# Patient Record
Sex: FEMALE | Race: BLACK OR AFRICAN AMERICAN | Employment: FULL TIME | URBAN - METROPOLITAN AREA
[De-identification: names, ages, dates, MRNs, and addresses within clinical notes are randomized per-mention and may not be internally consistent; named-entity substitution may affect disease eponyms.]

---

## 2017-01-09 ENCOUNTER — TELEPHONE (OUTPATIENT)
Dept: SURGERY | Age: 52
End: 2017-01-09

## 2017-01-09 ENCOUNTER — DOCUMENTATION ONLY (OUTPATIENT)
Dept: ONCOLOGY | Age: 52
End: 2017-01-09

## 2017-01-09 ENCOUNTER — OFFICE VISIT (OUTPATIENT)
Dept: SURGERY | Age: 52
End: 2017-01-09

## 2017-01-09 VITALS
DIASTOLIC BLOOD PRESSURE: 83 MMHG | WEIGHT: 204 LBS | SYSTOLIC BLOOD PRESSURE: 118 MMHG | HEART RATE: 83 BPM | HEIGHT: 66 IN | BODY MASS INDEX: 32.78 KG/M2

## 2017-01-09 DIAGNOSIS — C77.3 BREAST CANCER METASTASIZED TO AXILLARY LYMPH NODE, RIGHT (HCC): Primary | ICD-10-CM

## 2017-01-09 DIAGNOSIS — C50.411 BREAST CANCER OF UPPER-OUTER QUADRANT OF RIGHT FEMALE BREAST (HCC): ICD-10-CM

## 2017-01-09 DIAGNOSIS — C50.911 BREAST CANCER METASTASIZED TO AXILLARY LYMPH NODE, RIGHT (HCC): Primary | ICD-10-CM

## 2017-01-09 NOTE — MR AVS SNAPSHOT
Visit Information Date & Time Provider Department Dept. Phone Encounter #  
 1/9/2017  8:30 AM Elinor Cintron MD [address] at [facility] [phone] Follow-up Instructions Routing History Follow-up and Disposition History Your Appointments 1/17/2017  8:45 AM  
Follow Up with Laury Khanna  [facility] at [facility]) Appt Note: f/u same day port flush [address] [id number] [phone]  
  
   
 [address] [facility] [address] Upcoming Health Maintenance Date Due Hepatitis C Screening 1965 Pneumococcal 19-64 Highest Risk (1 of 3 - PCV13) 3/26/1984 DTaP/Tdap/Td series (1 - Tdap) 3/26/1986 PAP AKA CERVICAL CYTOLOGY 3/26/1986 FOBT Q 1 YEAR AGE 50-75 3/26/2015 INFLUENZA AGE 9 TO ADULT 8/1/2016 BREAST CANCER SCRN MAMMOGRAM 7/12/2018 Allergies as of 1/9/2017  Review Complete On: 1/9/2017 By: Elinor Cintron MD  
 No Known Allergies Current Immunizations  Reviewed on 12/16/2016 No immunizations on file. Not reviewed this visit You Were Diagnosed With   
  
 Codes Comments Breast cancer metastasized to axillary lymph node, right ([facility] [address]    -  Primary ICD-10-CM: C50.911, C77.3 ICD-9-CM: 174.9, 196.3 Breast cancer of upper-outer quadrant of right female breast ([facility] [address]     ICD-10-CM: C50.411 ICD-9-CM: 174.4 Vitals BP Pulse Height(growth percentile) Weight(growth percentile) BMI OB Status 118/83 83 5' 6\" (1.676 m) 204 lb (92.5 kg) 32.93 kg/m2 Chemically Induced Smoking Status Never Smoker BMI and BSA Data Body Mass Index Body Surface Area  
 32.93 kg/m 2 2.08 m 2 Preferred Pharmacy Pharmacy Name Phone [facility] [address] [phone] Your Updated Medication List  
  
   
 This list is accurate as of: 1/9/17  2:56 PM.  Always use your most recent med list.  
  
  
  
  
 dexamethasone 4 mg tablet Commonly known as:  DECADRON Take 2 tabs by mouth after chemotherapy x 2 days every 3 weeks  
  
 ferrous sulfate 324 mg (65 mg iron) tablet Take  by mouth Daily (before breakfast). lidocaine-prilocaine topical cream  
Commonly known as:  EMLA Apply  to affected area as needed for Pain. LORazepam 0.5 mg tablet Commonly known as:  ATIVAN Take 1 Tab by mouth two (2) times daily as needed for Anxiety (for anxiety). Max Daily Amount: 1 mg.  
  
 ondansetron 8 mg disintegrating tablet Commonly known as:  ZOFRAN ODT Take 1 Tab by mouth every eight (8) hours as needed for Nausea. Indications: CANCER CHEMOTHERAPY-INDUCED NAUSEA AND VOMITING  
  
 OTHER Taking liquid iron supplement, twice daily OTHER(NON-FORMULARY) Take 1 Tab by mouth three (3) times daily. Probiotic 11 and protease  
  
 pantoprazole 40 mg tablet Commonly known as:  PROTONIX Take 1 Tab by mouth daily. prochlorperazine 10 mg tablet Commonly known as:  COMPAZINE Take 1 Tab by mouth every six (6) hours as needed. Indications: CANCER CHEMOTHERAPY-INDUCED NAUSEA AND VOMITING  
  
 VITAMIN D3 1,000 unit Cap Generic drug:  cholecalciferol Take  by mouth daily. To-Do List   
 01/09/2017 Imaging:  MRI BREAST BI W WO CONT   
  
 01/11/2017  1:30 PM  
(Arrive by 1:15 PM) Appointment with [facility] 1 at [facility] ([phone]) 1. Please bring a list or a bag of your current medications to your appointment 2. Please be sure to remove ALL hair clips, pins, extensions, etc., prior to arriving for your MRI procedure. 3. Bring any non [facility] films or CDs pertaining to the area being imaged with you on the day of appointment.  4. A written order with a valid diagnosis and [phone]  signature is required for all scheduled tests. 5. Check in at registration 1 hour before your appointment time unless you were instructed to do otherwise. 6. If taking birth control, hormone replacement therapy or herbal supplements (black cohosh) it is advised that you cease 1 month prior to appointment to ensure quality of imaging obtained. Please arrive 15 minutes prior to appointment to register. 01/17/2017 8:30 AM  
  Appointment with Isamar Gisella [facility] ([phone])  
  
 02/10/2017 8:30 AM  
  Appointment with [address] [place] [address] - [facility] ([phone])  
  
 03/10/2017 8:30 AM  
  Appointment with [address] [place] [address] - [facility] ([phone]) Patient Instructions Learning About Breast Cancer Treatments Your Care Instructions Breast cancer means abnormal cells grow out of control in one or both breasts. These cancer cells can spread from the breast to nearby lymph nodes and other tissues. They can also spread to other parts of the body. The type and stage of cancer you have is based on: · Where in the breast the cancer started. · The genetics of those cancer cells. · How far cancer has spread within the breast, to nearby tissues, or to other organs. · What the cancer cells look like under a microscope. · Whether there is cancer in the nearby lymph nodes. Tests that help find the stage of your cancer can help choose the right treatment for you. These tests can include a breast biopsy, lymph node biopsy, blood tests, and X-rays. You may need other tests as well, such as a bone, CT, or MRI scan. Whether you have more tests depends on your symptoms and the stage of the cancer. When you find out that you have cancer, you may feel many emotions and may need some help coping. Seek out family, friends, and counselors for support.  You also can do things at home to make yourself feel better while you go through treatment. Call the [facility] ([phone]) or visit its website at [employer] Overnight. Bluetest for more information. Follow-up care is a key part of your treatment and safety. Be sure to make and go to all appointments, and call your doctor if you are having problems. It's also a good idea to know your test results and keep a list of the medicines you take. How is breast cancer treated? Your doctor may combine treatments. This is a common way to treat breast cancer. Treatment depends on what type and stage of cancer you have. You may have: · Surgery to remove the cancer. · Radiation. This uses high-dose X-rays to kill cancer cells and shrink tumors. · Chemotherapy. This uses medicine to kill cancer cells. · Hormone therapy. This uses medicines such as tamoxifen. It limits the effect of the hormone estrogen. This hormone can help some types of breast cancer cells to grow. · Biological therapy. This uses medicines such as trastuzumab (Herceptin) to help your immune system fight the cancer. What surgeries are done for breast cancer? Surgery is a key part of treatment for breast cancer. The main types of surgeries are: · Breast-conserving surgery. This is surgery that does not remove the whole breast. This includes: 
¨ Lumpectomy. This surgery removes the cancer in the breast and some of the normal tissue around it. ¨ Partial mastectomy. This surgery removes part of the breast. The lining of the chest muscles below the cancer and some of the lymph nodes may also be removed. · Surgery to remove the breast. This includes: ¨ Total mastectomy. This removes the whole breast. 
¨ Nipple-sparing mastectomy. This removes the whole breast but leaves the nipple. ¨ Modified radical mastectomy. This removes the whole breast and the lymph nodes under the arm (axillary lymph nodes). ¨ Radical mastectomy. This removes the whole breast, the chest muscles, and all the lymph nodes under the arm. If lymph nodes under the arm are removed, they are looked at under the microscope to check for cancer. There are two types of lymph node removal: · Home lymph node biopsy removes the lymph node that is the first to receive lymph fluid from the tumor. If cancer has spread from the breast to the lymph nodes, cancer cells most often show up in the sentinel node first. 
· Axillary lymph node dissection removes most of the lymph nodes in the armpit. The type of surgery you have depends on the size, location, and type of the cancer. It also depends on your overall health and personal preferences. Even if your doctor removes all the cancer that can be seen at the time of your surgery, you may still need more treatment. You may get radiation, chemotherapy, or hormone therapy after surgery to try to kill any cancer cells that may be left. No matter what kind of surgery you have, you will get information about why you are having it, what its risks are, how to prepare, and what to do after surgery. Where can you learn more? Go to http://myrna-conchis.info/. Enter X810 in the search box to learn more about \"Learning About Breast Cancer Treatments. \" Current as of: July 26, 2016 Content Version: 11.1 © 3265-1620 [employer]. Care instructions adapted under license by [employer] (which disclaims liability or warranty for this information). If you have questions about a medical condition or this instruction, always ask your healthcare professional. [place] [address] any warranty or liability for your use of this information. Introducing [facility]! Dear Alin Hastings: 
Thank you for requesting a Edifilm account. Our records indicate that you already have an active Edifilm account. You can access your account anytime at [email or web address] Online/Orchestra Networks Did you know that you can access your hospital and ER discharge instructions at any time in Pepperdata? You can also review all of your test results from your hospital stay or ER visit. Additional Information If you have questions, please visit the Frequently Asked Questions section of the Pepperdata website at [email or web address] Remember, Pepperdata is NOT to be used for urgent needs. For medical emergencies, dial 911. Now available from your iPhone and Android! Please provide this summary of care documentation to your next provider. Your primary care clinician is listed as Ness Grossman. If you have any questions after today's visit, please call [phone].

## 2017-01-09 NOTE — PROGRESS NOTES
Case d/w surgery  Unsure why pt did not f/u with surgery during chemo as we always recommend  Pt can see us for f/u as scheduled unless she wants sooner appt  Pt needs imaging then surgery

## 2017-01-09 NOTE — PROGRESS NOTES
HISTORY OF PRESENT ILLNESS  Esme Mchugh is a 46 y.o. female. HPI ESTABLISHED patient here to discuss surgery for RIGHT breast cancer. She recently completed neoadjuvant chemotherapy for RIGHT breast triple negative breast cancer. Last chemo was on 12/16/16. Denies any pain at this time. States that her RIGHT breast feels softer. STAGE: T2N1 clinically triple neg with sammy met.     TREATMENT COURSE: Biopsy only Neoadjuvant chemo carbo/ taxol, DD AC    Review of Systems   All other systems reviewed and are negative. Physical Exam   Pulmonary/Chest: Right breast exhibits no inverted nipple, no mass, no nipple discharge, no skin change and no tenderness. Left breast exhibits no inverted nipple, no mass, no nipple discharge, no skin change and no tenderness. Breasts are symmetrical.   Bilateral pendulous breasts. No appreciable masses or adenopathy today. Lymphadenopathy:     She has no cervical adenopathy. She has no axillary adenopathy. Nursing note and vitals reviewed. ASSESSMENT and PLAN    ICD-10-CM ICD-9-CM    1. Breast cancer metastasized to axillary lymph node, right ([facility]) C50.911 174.9 MRI BREAST BI W WO CONT    C77.3 196.3    2. Breast cancer of upper-outer quadrant of right female breast ([facility] [address] C50.411 174.4      45 yo female with a right breast T2N1 triple negative breast cancer with sammy metastasis. She is here by herself. 30  minutes were spent face-to-face with the patient during this encounter and 90% of that time was spent on counseling and coordination of care. - will order post [place] breast mri to assess response  - patient needs mastectomy, alnd, reconstruction  - will refer back to Dr. Fabiola Cherry to do restaging scans and labs  - will get appointment with plastics this week. The patient finished chemo 3 weeks ago. She was initially told by myself I need to see her every 4-6 weeks to assess response. She did not make any follow-up appointments.  Will get her mri and plastics appointment as soon as possible.

## 2017-01-09 NOTE — PROGRESS NOTES
Patient presented to office after seeing breast surgeon and stated that was advised should have been seen by surgery office while on chemo and had imaging prior to surgery. Stated surgeon's office was to order ultrasound to check response to chemo. Advised would forward to provider and patient can discuss plan at next visit. Support given. Patient verbalized understanding. Order to provider to change port flush date to coincide with clinic visit.

## 2017-01-09 NOTE — PATIENT INSTRUCTIONS
Learning About Breast Cancer Treatments  Your Care Instructions  Breast cancer means abnormal cells grow out of control in one or both breasts. These cancer cells can spread from the breast to nearby lymph nodes and other tissues. They can also spread to other parts of the body. The type and stage of cancer you have is based on:  · Where in the breast the cancer started. · The genetics of those cancer cells. · How far cancer has spread within the breast, to nearby tissues, or to other organs. · What the cancer cells look like under a microscope. · Whether there is cancer in the nearby lymph nodes. Tests that help find the stage of your cancer can help choose the right treatment for you. These tests can include a breast biopsy, lymph node biopsy, blood tests, and X-rays. You may need other tests as well, such as a bone, CT, or MRI scan. Whether you have more tests depends on your symptoms and the stage of the cancer. When you find out that you have cancer, you may feel many emotions and may need some help coping. Seek out family, friends, and counselors for support. You also can do things at home to make yourself feel better while you go through treatment. Call the [employer] ([phone]) or visit its website at Bharat Matrimony[employer] Secret Recipe. Ticket Monster ([place]) for more information. Follow-up care is a key part of your treatment and safety. Be sure to make and go to all appointments, and call your doctor if you are having problems. It's also a good idea to know your test results and keep a list of the medicines you take. How is breast cancer treated? Your doctor may combine treatments. This is a common way to treat breast cancer. Treatment depends on what type and stage of cancer you have. You may have:  · Surgery to remove the cancer. · Radiation. This uses high-dose X-rays to kill cancer cells and shrink tumors. · Chemotherapy. This uses medicine to kill cancer cells. · Hormone therapy.  This uses medicines such as tamoxifen. It limits the effect of the hormone estrogen. This hormone can help some types of breast cancer cells to grow. · Biological therapy. This uses medicines such as trastuzumab (Herceptin) to help your immune system fight the cancer. What surgeries are done for breast cancer? Surgery is a key part of treatment for breast cancer. The main types of surgeries are:  · Breast-conserving surgery. This is surgery that does not remove the whole breast. This includes:  ¨ Lumpectomy. This surgery removes the cancer in the breast and some of the normal tissue around it. ¨ Partial mastectomy. This surgery removes part of the breast. The lining of the chest muscles below the cancer and some of the lymph nodes may also be removed. · Surgery to remove the breast. This includes:  ¨ Total mastectomy. This removes the whole breast.  ¨ Nipple-sparing mastectomy. This removes the whole breast but leaves the nipple. ¨ Modified radical mastectomy. This removes the whole breast and the lymph nodes under the arm (axillary lymph nodes). ¨ Radical mastectomy. This removes the whole breast, the chest muscles, and all the lymph nodes under the arm. If lymph nodes under the arm are removed, they are looked at under the microscope to check for cancer. There are two types of lymph node removal:  · [place] lymph node biopsy removes the lymph node that is the first to receive lymph fluid from the tumor. If cancer has spread from the breast to the lymph nodes, cancer cells most often show up in the sentinel node first.  · Axillary lymph node dissection removes most of the lymph nodes in the armpit. The type of surgery you have depends on the size, location, and type of the cancer. It also depends on your overall health and personal preferences. Even if your doctor removes all the cancer that can be seen at the time of your surgery, you may still need more treatment.  You may get radiation, chemotherapy, or hormone therapy after surgery to try to kill any cancer cells that may be left. No matter what kind of surgery you have, you will get information about why you are having it, what its risks are, how to prepare, and what to do after surgery. Where can you learn more? Go to http://myrna-conchis.info/. Enter X810 in the search box to learn more about \"Learning About Breast Cancer Treatments. \"  Current as of: July 26, 2016  Content Version: 11.1  © 1319-3107 [employer]. Care instructions adapted under license by [employer] (which disclaims liability or warranty for this information). If you have questions about a medical condition or this instruction, always ask your healthcare professional. [address] any warranty or liability for your use of this information.

## 2017-01-11 DIAGNOSIS — C50.411 BREAST CANCER OF UPPER-OUTER QUADRANT OF RIGHT FEMALE BREAST (HCC): Primary | ICD-10-CM

## 2017-01-13 ENCOUNTER — HOSPITAL ENCOUNTER (OUTPATIENT)
Dept: INFUSION THERAPY | Age: 52
End: 2017-01-13
Payer: COMMERCIAL

## 2017-01-17 ENCOUNTER — OFFICE VISIT (OUTPATIENT)
Dept: ONCOLOGY | Age: 52
End: 2017-01-17

## 2017-01-17 ENCOUNTER — DOCUMENTATION ONLY (OUTPATIENT)
Dept: ONCOLOGY | Age: 52
End: 2017-01-17

## 2017-01-17 ENCOUNTER — HOSPITAL ENCOUNTER (OUTPATIENT)
Dept: INFUSION THERAPY | Age: 52
Discharge: HOME | End: 2017-01-17
Payer: COMMERCIAL

## 2017-01-17 VITALS
SYSTOLIC BLOOD PRESSURE: 121 MMHG | HEIGHT: 66 IN | HEART RATE: 86 BPM | OXYGEN SATURATION: 98 % | WEIGHT: 207.2 LBS | BODY MASS INDEX: 33.3 KG/M2 | DIASTOLIC BLOOD PRESSURE: 84 MMHG | TEMPERATURE: 98.1 F | RESPIRATION RATE: 16 BRPM

## 2017-01-17 VITALS
RESPIRATION RATE: 18 BRPM | HEART RATE: 86 BPM | DIASTOLIC BLOOD PRESSURE: 91 MMHG | TEMPERATURE: 97.7 F | SYSTOLIC BLOOD PRESSURE: 129 MMHG

## 2017-01-17 DIAGNOSIS — C50.911 BREAST CANCER METASTASIZED TO AXILLARY LYMPH NODE, RIGHT (HCC): ICD-10-CM

## 2017-01-17 DIAGNOSIS — C77.3 BREAST CANCER METASTASIZED TO AXILLARY LYMPH NODE, RIGHT (HCC): ICD-10-CM

## 2017-01-17 DIAGNOSIS — Z09 CHEMOTHERAPY FOLLOW-UP EXAMINATION: ICD-10-CM

## 2017-01-17 DIAGNOSIS — C50.411 BREAST CANCER OF UPPER-OUTER QUADRANT OF RIGHT FEMALE BREAST (HCC): Primary | ICD-10-CM

## 2017-01-17 LAB
ALBUMIN SERPL BCP-MCNC: 3.5 G/DL (ref 3.5–5)
ALBUMIN/GLOB SERPL: 1.3 {RATIO} (ref 1.1–2.2)
ALP SERPL-CCNC: 65 U/L (ref 45–117)
ALT SERPL-CCNC: 21 U/L (ref 12–78)
ANION GAP BLD CALC-SCNC: 6 MMOL/L (ref 5–15)
AST SERPL W P-5'-P-CCNC: 14 U/L (ref 15–37)
BASOPHILS # BLD AUTO: 0 K/UL (ref 0–0.1)
BASOPHILS # BLD: 0 % (ref 0–1)
BILIRUB SERPL-MCNC: 0.3 MG/DL (ref 0.2–1)
BUN SERPL-MCNC: 8 MG/DL (ref 6–20)
BUN/CREAT SERPL: 12 (ref 12–20)
CALCIUM SERPL-MCNC: 9 MG/DL (ref 8.5–10.1)
CHLORIDE SERPL-SCNC: 105 MMOL/L (ref 97–108)
CO2 SERPL-SCNC: 27 MMOL/L (ref 21–32)
CREAT SERPL-MCNC: 0.68 MG/DL (ref 0.55–1.02)
DIFFERENTIAL METHOD BLD: ABNORMAL
EOSINOPHIL # BLD: 0.2 K/UL (ref 0–0.4)
EOSINOPHIL NFR BLD: 7 % (ref 0–7)
ERYTHROCYTE [DISTWIDTH] IN BLOOD BY AUTOMATED COUNT: 18 % (ref 11.5–14.5)
GLOBULIN SER CALC-MCNC: 2.7 G/DL (ref 2–4)
GLUCOSE SERPL-MCNC: 103 MG/DL (ref 65–100)
HCT VFR BLD AUTO: 34.9 % (ref 35–47)
HGB BLD-MCNC: 10.7 G/DL (ref 11.5–16)
LYMPHOCYTES # BLD AUTO: 29 % (ref 12–49)
LYMPHOCYTES # BLD: 0.6 K/UL (ref 0.8–3.5)
MAGNESIUM SERPL-MCNC: 1.7 MG/DL (ref 1.6–2.4)
MCH RBC QN AUTO: 28.7 PG (ref 26–34)
MCHC RBC AUTO-ENTMCNC: 30.7 G/DL (ref 30–36.5)
MCV RBC AUTO: 93.6 FL (ref 80–99)
MONOCYTES # BLD: 0.2 K/UL (ref 0–1)
MONOCYTES NFR BLD AUTO: 11 % (ref 5–13)
NEUTS SEG # BLD: 1.2 K/UL (ref 1.8–8)
NEUTS SEG NFR BLD AUTO: 53 % (ref 32–75)
PLATELET # BLD AUTO: 208 K/UL (ref 150–400)
POTASSIUM SERPL-SCNC: 3.6 MMOL/L (ref 3.5–5.1)
PROT SERPL-MCNC: 6.2 G/DL (ref 6.4–8.2)
RBC # BLD AUTO: 3.73 M/UL (ref 3.8–5.2)
RBC MORPH BLD: ABNORMAL
SODIUM SERPL-SCNC: 138 MMOL/L (ref 136–145)
WBC # BLD AUTO: 2.2 K/UL (ref 3.6–11)

## 2017-01-17 PROCEDURE — 80053 COMPREHEN METABOLIC PANEL: CPT | Performed by: NURSE PRACTITIONER

## 2017-01-17 PROCEDURE — 85025 COMPLETE CBC W/AUTO DIFF WBC: CPT | Performed by: NURSE PRACTITIONER

## 2017-01-17 PROCEDURE — 77030012965 HC NDL HUBR BBMI -A

## 2017-01-17 PROCEDURE — 36415 COLL VENOUS BLD VENIPUNCTURE: CPT | Performed by: NURSE PRACTITIONER

## 2017-01-17 PROCEDURE — 36591 DRAW BLOOD OFF VENOUS DEVICE: CPT

## 2017-01-17 PROCEDURE — 83735 ASSAY OF MAGNESIUM: CPT | Performed by: NURSE PRACTITIONER

## 2017-01-17 PROCEDURE — 74011250636 HC RX REV CODE- 250/636: Performed by: NURSE PRACTITIONER

## 2017-01-17 RX ORDER — SODIUM CHLORIDE 0.9 % (FLUSH) 0.9 %
10 SYRINGE (ML) INJECTION AS NEEDED
Status: ACTIVE | OUTPATIENT
Start: 2017-01-17 | End: 2017-01-18

## 2017-01-17 RX ORDER — SODIUM CHLORIDE 9 MG/ML
10 INJECTION INTRAMUSCULAR; INTRAVENOUS; SUBCUTANEOUS AS NEEDED
Status: ACTIVE | OUTPATIENT
Start: 2017-01-17 | End: 2017-01-18

## 2017-01-17 RX ORDER — HEPARIN 100 UNIT/ML
500 SYRINGE INTRAVENOUS AS NEEDED
Status: ACTIVE | OUTPATIENT
Start: 2017-01-17 | End: 2017-01-18

## 2017-01-17 RX ADMIN — Medication 10 ML: at 08:49

## 2017-01-17 RX ADMIN — SODIUM CHLORIDE, PRESERVATIVE FREE 500 UNITS: 5 INJECTION INTRAVENOUS at 08:49

## 2017-01-17 NOTE — PROGRESS NOTES
HEME/ONC PROGRESS NOTE       Alfonso Garduno is a 46 y.o. 1965 female and presents with Breast Cancer    CC  Triple neg right breast cancer 7/16    HPI  Pt seen today in office for f/u breast cancer. Ms. Den Casas completed chemotherapy with Carbo/Taxol followed by THALIA ARRINGTON on 12/16/17. She has met with surgery and is here for follow-up post chemotherapy today. Ms. Den Casas reports she is feeling well off chemotherapy. She denies nausea, vomiting, or diarrhea. She has had no shortness of breath or chest pain. She denies headaches or vision changes. She reports energy level is stable. DX   Encounter Diagnoses   Name Primary?     Breast cancer of upper-outer quadrant of right female breast ([address] Yes    Breast cancer metastasized to axillary lymph node, right ([address]     Chemotherapy follow-up examination         STAGE: T2N1 clinically triple neg    TREATMENT COURSE:  Biopsy only  Neoadjuvant chemo carbo/ taxol, THALIA ARRINGTON    Past Medical History   Diagnosis Date    Atopic dermatitis     Cancer [facility]) July 2016     R breast cancer    Pleurisy 2006    Thyroid disease      \"nodule\"- benign     Past Surgical History   Procedure Laterality Date    Hx tonsillectomy  1974    Hx breast biopsy Right 2016    Hx gyn       [facility]     Social History     Social History    Marital status:      Spouse name: N/A    Number of children: N/A    Years of education: N/A     Social History Main Topics    Smoking status: Never Smoker    Smokeless tobacco: Never Used    Alcohol use No    Drug use: No    Sexual activity: Not Asked     Other Topics Concern    None     Social History Narrative     Family History   Problem Relation Age of Onset    Colon Cancer Mother 39    Colon Cancer Paternal Uncle 79    Colon Cancer Paternal Uncle 79     No Known Allergies    Review of Systems    A comprehensive review of systems was negative except for: per HPI     Objective:  Visit Vitals    /84    Pulse 86    Temp 98.1 °F (36.7 °C) (Oral)    Resp 16    Ht 5' 6\" (1.676 m)    Wt 207 lb 3.2 oz (94 kg)    SpO2 98%    BMI 33.44 kg/m2         Physical Exam:   General appearance - alert, well appearing, and in no distress, oriented to person, place, and time and overweight  Mental status - alert, oriented to person, place, and time, normal mood, behavior, speech, dress, motor activity, and thought processes  EYE-conj clear  Mouth - mucous membranes moist, pharynx normal. No lesions or thrush. Neck - supple, no significant adenopathy   Chest - clear to auscultation bilaterally  Heart - regular rate and rhythm  Abdomen - round, soft, nontender  Ext-no pedal edema noted  Skin-Warm and dry. Neuro -alert, oriented, normal speech, no focal findings or movement disorder noted  Breasts - No palpable masses bilaterally    Diagnostic Imaging   reviewed  Results for orders placed during the hospital encounter of 07/26/16   CT ABD PELV W CONT    Narrative **Final Report**       ICD Codes / Adm. Diagnosis: 174.4  C50.411 / Malignant neoplasm of upper-ou    Malignant neoplasm of upper-  Examination:  CT ABD PELVIS W CON  - [id number] - Jul 26 2016 11:57AM  Accession No:  [id number]  Reason:  stage 2 breast cancer      REPORT:  INDICATION:  Stage II breast cancer. Surgery:  None reported. EXAM: Multiple contiguous helically acquired images were obtained through   the chest, abdomen and pelvis following intravenous 97 ccs and oral contrast   administration. Coronal and sagittal reconstructions were performed. CT dose reduction was achieved through the use of a standardized protocol   tailored for this examination and automatic exposure control for dose   modulation. FINDINGS:     Previous studies for comparison:  None are available. Images through the thyroid gland reveal multinodular enlargement of the left   lobe. Images through the right axilla reveal enlargement of the axillary lymph   nodes with a dominant node measuring 4.6 x 2.3 cm in size. Mild prominence   of the left axillary nodes is noted. Images through the right breast reveal the presence of a 2.2 x 2.0 cm soft   tissue mass. An additional contiguous nodular density is present measuring   1.3 cm. Images through the mediastinum and hilar regions reveal no mass or   adenopathy. Images through the lung parenchyma reveal no parenchymal mass or pleural   effusion. Images through the liver reveal scattered tiny hypodensities throughout both   lobes too small to accurately characterize. No biliary dilatation. There is adequate opacification of the hepatic and portal veins. Images through the gallbladder fossa reveal no gallstones. The spleen is normal in size. The pancreas has a normal morphologic appearance. Bilateral renal function is demonstrated. No hydronephrosis is present. The adrenal glands are normal.    The visualized opacified bowel loops are  nonobstructed . Images through the pelvis reveal enlargement of the uterus presumably   representing fibroid change. Small ovarian cysts appear to be present. Otherwise no mass or lymphadenopathy or drainable fluid collection. Bone windows demonstrate no destructive process. Tiny sclerotic densities in   the left humeral head and right iliac wing possibly bone island. IMPRESSION:    Multinodular enlargement of the left lobe of the thyroid gland with mild   displacement of the trachea. There is a two cm soft tissue mass in the right breast.    Right axillary lymphadenopathy. Multiple scattered tiny hypodensities in both lobes of the liver too small   to characterize. Otherwise no evidence of metastatic disease to the chest, abdomen, and   pelvis.               Signing/Reading Doctor: Chuck Fan ([id number])    Sharri Fan ([id number])  Jul 26 2016  2:04PM                                Lab Results  reviewed  Lab Results   Component Value Date/Time    WBC 2.2 01/17/2017 08:42 AM    HGB 10.7 01/17/2017 08:42 AM    HCT 34.9 01/17/2017 08:42 AM    PLATELET 409 14/95/9910 08:42 AM    MCV 93.6 01/17/2017 08:42 AM       Lab Results   Component Value Date/Time    Sodium 138 01/17/2017 08:42 AM    Potassium 3.6 01/17/2017 08:42 AM    Chloride 105 01/17/2017 08:42 AM    CO2 27 01/17/2017 08:42 AM    Anion gap 6 01/17/2017 08:42 AM    Glucose 103 01/17/2017 08:42 AM    BUN 8 01/17/2017 08:42 AM    Creatinine 0.68 01/17/2017 08:42 AM    BUN/Creatinine ratio 12 01/17/2017 08:42 AM    GFR est AA >60 01/17/2017 08:42 AM    GFR est non-AA >60 01/17/2017 08:42 AM    Calcium 9.0 01/17/2017 08:42 AM    ALT 21 01/17/2017 08:42 AM    AST 14 01/17/2017 08:42 AM    Alk. phosphatase 65 01/17/2017 08:42 AM    Protein, total 6.2 01/17/2017 08:42 AM    Albumin 3.5 01/17/2017 08:42 AM    Globulin 2.7 01/17/2017 08:42 AM    A-G Ratio 1.3 01/17/2017 08:42 AM       Assessment/Plan:    1. Clinical T2N1 right triple negative breast cancer s/p biopsy only    Breast MRI neg on left and 2 masses on right with axilla mass. Staging studies neg bone scan and CTs neg. ECHO with EF 64% prior to chemo. She completed neoadjuvant chemotherapy with Carbo/Taxol followed by DD AC on 12/16/16. She met with surgery 1/9/17 and met with plastic surgery yesterday. She is waiting for surgery date to be scheduled. Restaging CT's chest/abdomen/pelvis ordered to evaluate post-chemotherapy. 2.  Anemia prior to chemo start. Hx iron deficiency. Labs pending from today. Patient continues on ferrous sulfate twice daily. Follow-up in 1 month. Seen in conjunction with Micha Lira NP. ICD-10-CM ICD-9-CM    1. Breast cancer of upper-outer quadrant of right female breast ([facility] [address] C50.411 174.4 CT CHEST ABD PELV W CONT      REFERRAL TO RADIATION ONCOLOGY   2. Breast cancer metastasized to axillary lymph node, right ([facility]) C50.911 174.9 CT CHEST ABD PELV W CONT    C77.3 196.3 REFERRAL TO RADIATION ONCOLOGY   3. Chemotherapy follow-up examination Z09 V67.2        Current Outpatient Prescriptions   Medication Sig    ferrous sulfate 324 mg (65 mg iron) tablet Take  by mouth Daily (before breakfast).  cholecalciferol (VITAMIN D3) 1,000 unit cap Take  by mouth daily.  OTHER,NON-FORMULARY, Take 1 Tab by mouth three (3) times daily. Probiotic 11 and protease    LORazepam (ATIVAN) 0.5 mg tablet Take 1 Tab by mouth two (2) times daily as needed for Anxiety (for anxiety). Max Daily Amount: 1 mg.  ondansetron (ZOFRAN ODT) 8 mg disintegrating tablet Take 1 Tab by mouth every eight (8) hours as needed for Nausea. Indications: CANCER CHEMOTHERAPY-INDUCED NAUSEA AND VOMITING    prochlorperazine (COMPAZINE) 10 mg tablet Take 1 Tab by mouth every six (6) hours as needed. Indications: CANCER CHEMOTHERAPY-INDUCED NAUSEA AND VOMITING    lidocaine-prilocaine (EMLA) topical cream Apply  to affected area as needed for Pain. No current facility-administered medications for this visit. continue present plan, call if any problems  There are no Patient Instructions on file for this visit. Follow-up Disposition:  Return in about 1 month (around 2/17/2017).     Jose Francisco Horn NP

## 2017-01-17 NOTE — PROGRESS NOTES
0830 Pt arrived ambulatory and in no distress for port flush and labs. Port accessed without issue, positive blood return, labs drawn and flushed with heparin to deaccess. Meds:  Heparin flush 500 units  NS flush  Patient Vitals for the past 12 hrs:   Temp Pulse Resp BP   01/17/17 0836 97.7 °F (36.5 °C) 86 18 (!) 129/91     0849 Pt discharged ambulatory, tolerated visit well. Nect appointment scheduled for 2/10/17 at 0830. See [facility] for complete labs. Recent Results (from the past 12 hour(s))   CBC WITH AUTOMATED DIFF    Collection Time: 01/17/17  8:42 AM   Result Value Ref Range    WBC 2.2 (L) 3.6 - 11.0 K/uL    RBC 3.73 (L) 3.80 - 5.20 M/uL    HGB 10.7 (L) 11.5 - 16.0 g/dL    HCT 34.9 (L) 35.0 - 47.0 %    MCV 93.6 80.0 - 99.0 FL    MCH 28.7 26.0 - 34.0 PG    MCHC 30.7 30.0 - 36.5 g/dL    RDW 18.0 (H) 11.5 - 14.5 %    PLATELET 003 505 - 468 K/uL    NEUTROPHILS 53 32 - 75 %    LYMPHOCYTES 29 12 - 49 %    MONOCYTES 11 5 - 13 %    EOSINOPHILS 7 0 - 7 %    BASOPHILS 0 0 - 1 %    ABS. NEUTROPHILS 1.2 (L) 1.8 - 8.0 K/UL    ABS. LYMPHOCYTES 0.6 (L) 0.8 - 3.5 K/UL    ABS. MONOCYTES 0.2 0.0 - 1.0 K/UL    ABS. EOSINOPHILS 0.2 0.0 - 0.4 K/UL    ABS.  BASOPHILS 0.0 0.0 - 0.1 K/UL    DF SMEAR SCANNED      RBC COMMENTS TEARDROP CELLS  1+        RBC COMMENTS OVALOCYTES  1+        RBC COMMENTS SCHISTOCYTES  1+        RBC COMMENTS ANISOCYTOSIS  1+       METABOLIC PANEL, COMPREHENSIVE    Collection Time: 01/17/17  8:42 AM   Result Value Ref Range    Sodium 138 136 - 145 mmol/L    Potassium 3.6 3.5 - 5.1 mmol/L    Chloride 105 97 - 108 mmol/L    CO2 27 21 - 32 mmol/L    Anion gap 6 5 - 15 mmol/L    Glucose 103 (H) 65 - 100 mg/dL    BUN 8 6 - 20 MG/DL    Creatinine 0.68 0.55 - 1.02 MG/DL    BUN/Creatinine ratio 12 12 - 20      GFR est AA >60 >60 ml/min/1.73m2    GFR est non-AA >60 >60 ml/min/1.73m2    Calcium 9.0 8.5 - 10.1 MG/DL    Bilirubin, total 0.3 0.2 - 1.0 MG/DL    ALT 21 12 - 78 U/L    AST 14 (L) 15 - 37 U/L Alk. phosphatase 65 45 - 117 U/L    Protein, total 6.2 (L) 6.4 - 8.2 g/dL    Albumin 3.5 3.5 - 5.0 g/dL    Globulin 2.7 2.0 - 4.0 g/dL    A-G Ratio 1.3 1.1 - 2.2     MAGNESIUM    Collection Time: 01/17/17  8:42 AM   Result Value Ref Range    Magnesium 1.7 1.6 - 2.4 mg/dL

## 2017-01-17 NOTE — PROGRESS NOTES
Problem: Knowledge Deficit  Goal: *Verbalizes understanding of procedures and medications  Outcome: Progressing Towards Goal  Discussed port flushes

## 2017-01-19 ENCOUNTER — TELEPHONE (OUTPATIENT)
Dept: ONCOLOGY | Age: 52
End: 2017-01-19

## 2017-01-19 ENCOUNTER — TELEPHONE (OUTPATIENT)
Dept: SURGERY | Age: 52
End: 2017-01-19

## 2017-01-19 NOTE — TELEPHONE ENCOUNTER
[facility]/rad/onc called HIPAA verified. Stated patient has never contacted their office for referral.  Information provided, rad/onc to reach out to patient.

## 2017-01-20 ENCOUNTER — TELEPHONE (OUTPATIENT)
Dept: ONCOLOGY | Age: 52
End: 2017-01-20

## 2017-01-20 NOTE — TELEPHONE ENCOUNTER
Verbally informed patient of surgery   Date: 02/23/2017 @ 11:15 am   Arrive: 9:00 AM  report to [address] past ER; [address] [facility]. Entrance.  MOB 3      Gave following instructions:  NPO after Midnight the night before  Shower in AM, no lotion, deodorant, powder,perfume or makeup  Will need  morning of the surgery

## 2017-01-20 NOTE — TELEPHONE ENCOUNTER
Per [facility] patient was contacted to schedule appt and did not know if needed to be seen prior to or after surgery. Advised patient instructed to make appt when convenient and could be done either prior to or after surgery for treatment plan discussion. Thanked for info.

## 2017-01-21 ENCOUNTER — HOSPITAL ENCOUNTER (OUTPATIENT)
Dept: CT IMAGING | Age: 52
Discharge: HOME | End: 2017-01-21
Attending: NURSE PRACTITIONER
Payer: COMMERCIAL

## 2017-01-21 DIAGNOSIS — C77.3 BREAST CANCER METASTASIZED TO AXILLARY LYMPH NODE, RIGHT (HCC): ICD-10-CM

## 2017-01-21 DIAGNOSIS — C50.911 BREAST CANCER METASTASIZED TO AXILLARY LYMPH NODE, RIGHT (HCC): ICD-10-CM

## 2017-01-21 DIAGNOSIS — C50.411 BREAST CANCER OF UPPER-OUTER QUADRANT OF RIGHT FEMALE BREAST (HCC): ICD-10-CM

## 2017-01-21 PROCEDURE — 71260 CT THORAX DX C+: CPT

## 2017-01-21 PROCEDURE — 74177 CT ABD & PELVIS W/CONTRAST: CPT

## 2017-01-21 PROCEDURE — 74011636320 HC RX REV CODE- 636/320: Performed by: NURSE PRACTITIONER

## 2017-01-21 PROCEDURE — 74011000258 HC RX REV CODE- 258: Performed by: NURSE PRACTITIONER

## 2017-01-21 RX ORDER — SODIUM CHLORIDE 0.9 % (FLUSH) 0.9 %
10 SYRINGE (ML) INJECTION
Status: COMPLETED | OUTPATIENT
Start: 2017-01-21 | End: 2017-01-21

## 2017-01-21 RX ADMIN — IOHEXOL 50 ML: 240 INJECTION, SOLUTION INTRATHECAL; INTRAVASCULAR; INTRAVENOUS; ORAL at 10:26

## 2017-01-21 RX ADMIN — IOPAMIDOL 100 ML: 755 INJECTION, SOLUTION INTRAVENOUS at 10:26

## 2017-01-21 RX ADMIN — Medication 10 ML: at 10:26

## 2017-01-21 RX ADMIN — SODIUM CHLORIDE 100 ML: 900 INJECTION, SOLUTION INTRAVENOUS at 10:26

## 2017-01-23 ENCOUNTER — PATIENT MESSAGE (OUTPATIENT)
Dept: SURGERY | Age: 52
End: 2017-01-23

## 2017-01-23 ENCOUNTER — TELEPHONE (OUTPATIENT)
Dept: ONCOLOGY | Age: 52
End: 2017-01-23

## 2017-01-23 NOTE — TELEPHONE ENCOUNTER
[facility] states that Ricarda Sanderson has called the office requested scan results however the scan was ordered by our office. Can we please call the patient with results?

## 2017-01-23 NOTE — TELEPHONE ENCOUNTER
From: Christina Martin  To: Nehal Nettles MD  Sent: 1/23/2017 11:05 AM EST  Subject: Non-Urgent Medical Question    Good Morning. Doctor Analilia Steward (plastic surgeon) office says I will on be in the hospital for 1 day after procedures since both techniques will take under 2 hours. Is this true? Also, I have some paperwork that needs to be signed before February due to time off work. How can that be handled? Finally, want to know how CT scan was.  Thanks, Daphney Mcmillan

## 2017-01-25 DIAGNOSIS — C50.411 MALIGNANT NEOPLASM OF UPPER-OUTER QUADRANT OF RIGHT FEMALE BREAST (HCC): Primary | ICD-10-CM

## 2017-01-27 ENCOUNTER — DOCUMENTATION ONLY (OUTPATIENT)
Dept: SURGERY | Age: 52
End: 2017-01-27

## 2017-01-27 ENCOUNTER — TELEPHONE (OUTPATIENT)
Dept: SURGERY | Age: 52
End: 2017-01-27

## 2017-01-27 NOTE — TELEPHONE ENCOUNTER
Patient requesting letter to be excused from testifying in grievance hearing from work on 2/15/17, as it is the week before her surgery. See letter. Faxed to provided # [phone]. Fax confirmation received.

## 2017-01-27 NOTE — PROGRESS NOTES
Trace Armijo emailed me patient's [place] paperwork to be filled out for her 2-23-17 surgery. Completed and emailed back to Trace Armijo so patient can pick them up.

## 2017-01-31 ENCOUNTER — HOSPITAL ENCOUNTER (OUTPATIENT)
Dept: INFUSION THERAPY | Age: 52
End: 2017-01-31

## 2017-02-03 ENCOUNTER — DOCUMENTATION ONLY (OUTPATIENT)
Dept: ONCOLOGY | Age: 52
End: 2017-02-03

## 2017-02-10 ENCOUNTER — HOSPITAL ENCOUNTER (OUTPATIENT)
Dept: INFUSION THERAPY | Age: 52
Discharge: HOME | End: 2017-02-10
Payer: COMMERCIAL

## 2017-02-10 ENCOUNTER — DOCUMENTATION ONLY (OUTPATIENT)
Dept: ONCOLOGY | Age: 52
End: 2017-02-10

## 2017-02-10 VITALS
RESPIRATION RATE: 18 BRPM | HEART RATE: 80 BPM | DIASTOLIC BLOOD PRESSURE: 93 MMHG | OXYGEN SATURATION: 96 % | TEMPERATURE: 98.1 F | SYSTOLIC BLOOD PRESSURE: 134 MMHG

## 2017-02-10 LAB
ALBUMIN SERPL BCP-MCNC: 3.5 G/DL (ref 3.5–5)
ALBUMIN/GLOB SERPL: 1.1 {RATIO} (ref 1.1–2.2)
ALP SERPL-CCNC: 83 U/L (ref 45–117)
ALT SERPL-CCNC: 19 U/L (ref 12–78)
ANION GAP BLD CALC-SCNC: 7 MMOL/L (ref 5–15)
AST SERPL W P-5'-P-CCNC: 10 U/L (ref 15–37)
BASOPHILS # BLD AUTO: 0 K/UL (ref 0–0.1)
BASOPHILS # BLD: 1 % (ref 0–1)
BILIRUB SERPL-MCNC: 0.3 MG/DL (ref 0.2–1)
BUN SERPL-MCNC: 9 MG/DL (ref 6–20)
BUN/CREAT SERPL: 13 (ref 12–20)
CALCIUM SERPL-MCNC: 9.1 MG/DL (ref 8.5–10.1)
CHLORIDE SERPL-SCNC: 104 MMOL/L (ref 97–108)
CO2 SERPL-SCNC: 27 MMOL/L (ref 21–32)
CREAT SERPL-MCNC: 0.68 MG/DL (ref 0.55–1.02)
EOSINOPHIL # BLD: 0.1 K/UL (ref 0–0.4)
EOSINOPHIL NFR BLD: 5 % (ref 0–7)
ERYTHROCYTE [DISTWIDTH] IN BLOOD BY AUTOMATED COUNT: 16 % (ref 11.5–14.5)
GLOBULIN SER CALC-MCNC: 3.1 G/DL (ref 2–4)
GLUCOSE SERPL-MCNC: 95 MG/DL (ref 65–100)
HCT VFR BLD AUTO: 35.4 % (ref 35–47)
HGB BLD-MCNC: 11.2 G/DL (ref 11.5–16)
LYMPHOCYTES # BLD AUTO: 38 % (ref 12–49)
LYMPHOCYTES # BLD: 0.8 K/UL (ref 0.8–3.5)
MAGNESIUM SERPL-MCNC: 1.9 MG/DL (ref 1.6–2.4)
MCH RBC QN AUTO: 27.9 PG (ref 26–34)
MCHC RBC AUTO-ENTMCNC: 31.6 G/DL (ref 30–36.5)
MCV RBC AUTO: 88.3 FL (ref 80–99)
MONOCYTES # BLD: 0.2 K/UL (ref 0–1)
MONOCYTES NFR BLD AUTO: 7 % (ref 5–13)
NEUTS SEG # BLD: 1.1 K/UL (ref 1.8–8)
NEUTS SEG NFR BLD AUTO: 49 % (ref 32–75)
PLATELET # BLD AUTO: 197 K/UL (ref 150–400)
POTASSIUM SERPL-SCNC: 3.8 MMOL/L (ref 3.5–5.1)
PROT SERPL-MCNC: 6.6 G/DL (ref 6.4–8.2)
RBC # BLD AUTO: 4.01 M/UL (ref 3.8–5.2)
SODIUM SERPL-SCNC: 138 MMOL/L (ref 136–145)
WBC # BLD AUTO: 2.2 K/UL (ref 3.6–11)

## 2017-02-10 PROCEDURE — 74011250636 HC RX REV CODE- 250/636: Performed by: INTERNAL MEDICINE

## 2017-02-10 PROCEDURE — 36591 DRAW BLOOD OFF VENOUS DEVICE: CPT

## 2017-02-10 PROCEDURE — 83735 ASSAY OF MAGNESIUM: CPT | Performed by: INTERNAL MEDICINE

## 2017-02-10 PROCEDURE — 85025 COMPLETE CBC W/AUTO DIFF WBC: CPT | Performed by: INTERNAL MEDICINE

## 2017-02-10 PROCEDURE — 36415 COLL VENOUS BLD VENIPUNCTURE: CPT | Performed by: INTERNAL MEDICINE

## 2017-02-10 PROCEDURE — 74011000250 HC RX REV CODE- 250: Performed by: INTERNAL MEDICINE

## 2017-02-10 PROCEDURE — 77030012965 HC NDL HUBR BBMI -A

## 2017-02-10 PROCEDURE — 80053 COMPREHEN METABOLIC PANEL: CPT | Performed by: INTERNAL MEDICINE

## 2017-02-10 RX ORDER — SODIUM CHLORIDE 9 MG/ML
10 INJECTION INTRAMUSCULAR; INTRAVENOUS; SUBCUTANEOUS AS NEEDED
Status: ACTIVE | OUTPATIENT
Start: 2017-02-10 | End: 2017-02-11

## 2017-02-10 RX ORDER — HEPARIN 100 UNIT/ML
500 SYRINGE INTRAVENOUS AS NEEDED
Status: ACTIVE | OUTPATIENT
Start: 2017-02-10 | End: 2017-02-11

## 2017-02-10 RX ORDER — SODIUM CHLORIDE 0.9 % (FLUSH) 0.9 %
5-10 SYRINGE (ML) INJECTION AS NEEDED
Status: DISCONTINUED | OUTPATIENT
Start: 2017-02-10 | End: 2017-02-14 | Stop reason: HOSPADM

## 2017-02-10 RX ADMIN — Medication 10 ML: at 08:53

## 2017-02-10 RX ADMIN — Medication 500 UNITS: at 08:53

## 2017-02-10 RX ADMIN — SODIUM CHLORIDE 10 ML: 9 INJECTION, SOLUTION INTRAMUSCULAR; INTRAVENOUS; SUBCUTANEOUS at 08:52

## 2017-02-10 NOTE — PROGRESS NOTES
Patient stopped by office today to see if she needed an appointment. She has surgery scheduled for 2/22/17, advised she can follow-up after breast surgery unless any new issues. Discussed with Dr. Luis Arroyo who is in agreement. She reports she is concerned she could have an ear infection. Advised that she follow-up with her PCP for ear evaluation and management. She reports she has a sense of fullness in her right axilla. Advised to follow-up with breast surgery for evaluation of axillary fullness and patient reports she will contact Dr. Ira Preston. She denies any additional questions or concerns at this time. Will forward to [facility] to call patient to schedule an appointment after surgery with Dr. Luis Arroyo.

## 2017-02-13 NOTE — PERIOP NOTES
Faxed CMP, CBC done 2/10/17 to Dr. Sammy multani's office, attention Dagoberto Juarez, requesting orders and are these labs  Okay, and do we need anything else pre op like an EKG. Left my fax/phone number on fax. Called 2/10/17 leaving a voicemail for orders: later I found CBC/CMP results in [facility]. 2/16/17  0850: Dr. David Maldonado nurse, O[facility] Perales says CBC/CMP good for surgery. No EKG is needed unless anesthesia needs one per , says Shilpi Maciel. Chart has been marked. She will fax orders.

## 2017-02-16 ENCOUNTER — TELEPHONE (OUTPATIENT)
Dept: SURGERY | Age: 52
End: 2017-02-16

## 2017-02-16 NOTE — TELEPHONE ENCOUNTER
Both Santa Chavis, and I called and talked to the patient and offered her an appointment to come in and talk to Dr. Kristen Metz on Monday to go over her questions about surgery. She declined this.

## 2017-02-21 NOTE — PERIOP NOTES
[facility]  Ambulatory Surgery Unit  Pre-operative Instructions    Surgery/Procedure Date  2/23/17            Tentative Arrival Time 1000      1. On the day of your surgery/procedure, please report to the Ambulatory Surgery Unit Registration Desk and sign in at your designated time. The Ambulatory Surgery Unit is located in [facility] on the [facility] side of the [facility] across from the [address] [place] [address]. Please have all of your health insurance cards and a photo ID. 2. You must have someone with you to drive you home, as you should not drive a car for 24 hours following anesthesia. Please make arrangements for a responsible adult friend or family member to stay with you for at least the first 24 hours after your surgery. 3. Do not have anything to eat or drink (including water, gum, mints, coffee, juice) after midnight   2/22/17. This may not apply to medications prescribed by your physician. (Please note below the special instructions with medications to take the morning of surgery, if applicable.)    4. We recommend you do not drink any alcoholic beverages for 24 hours before and after your surgery. 5. Stop all Aspirin, non-steroidal anti-inflammatory drugs (i.e. Advil, Aleve), vitamins, and supplements as directed by your surgeon's office. **If you are currently taking Plavix, Coumadin, or other blood-thinning agents, contact your surgeon for instructions. **    6. In an effort to help prevent surgical site infection, we ask that you shower with an anti-bacterial soap (i.e. Dial or Safeguard) for 3 days prior to and on the morning of surgery, using a fresh towel after each shower. (Please begin this process with fresh bed linens.) Do not apply any lotions, powders, or deodorants after the shower on the day of your procedure. If applicable, please do not shave the operative site for 48 hours prior to surgery. 7. Wear comfortable clothes.  Wear glasses instead of contacts. Do not bring any jewelry or money (other than copays or fees as instructed). Do not wear make-up, particularly mascara, the morning of your surgery. Do not wear nail polish, particularly if you are having foot /hand surgery. Wear your hair loose or down, no ponytails, buns, remberto pins or clips. All body piercings must be removed. 8. You should understand that if you do not follow these instructions your surgery may be cancelled. If your physical condition changes (i.e. fever, cold or flu) please contact your surgeon as soon as possible. 9. It is important that you be on time. If a situation occurs where you may be late, or if you have any questions or problems, please call [phone].    10. Your surgery time may be subject to change. You will receive a phone call the day prior to surgery to confirm your arrival time. 11. Pediatric patients: please bring a change of clothes, diapers, bottle/sippy cup, pacifier, etc.      Special Instructions:    MEDICATIONS TO TAKE THE MORNING OF SURGERY WITH A SIP OF WATER: none      I understand a pre-operative phone call will be made to verify my surgery time. In the event that I am not available, I give permission for a message to be left on my answering service and/or with another person?       Yes     (instructions given verbally during phone assessment- pt voiced understanding)     ___________________      ___________________      ________________  (Signature of Patient)          (Witness)                   (Date and Time)

## 2017-02-22 ENCOUNTER — ANESTHESIA EVENT (OUTPATIENT)
Dept: SURGERY | Age: 52
DRG: 582 | End: 2017-02-22
Payer: COMMERCIAL

## 2017-02-23 ENCOUNTER — ANESTHESIA (OUTPATIENT)
Dept: SURGERY | Age: 52
DRG: 582 | End: 2017-02-23
Payer: COMMERCIAL

## 2017-02-23 ENCOUNTER — SURGERY (OUTPATIENT)
Age: 52
End: 2017-02-23

## 2017-02-23 ENCOUNTER — HOSPITAL ENCOUNTER (INPATIENT)
Age: 52
LOS: 2 days | Discharge: HOME | DRG: 582 | End: 2017-02-25
Attending: SURGERY | Admitting: PLASTIC SURGERY
Payer: COMMERCIAL

## 2017-02-23 DIAGNOSIS — C50.411 MALIGNANT NEOPLASM OF UPPER-OUTER QUADRANT OF RIGHT FEMALE BREAST (HCC): ICD-10-CM

## 2017-02-23 PROBLEM — C50.919 BREAST CA (HCC): Status: ACTIVE | Noted: 2017-02-23

## 2017-02-23 LAB — HCG UR QL: NEGATIVE

## 2017-02-23 PROCEDURE — 77030008467 HC STPLR SKN COVD -B: Performed by: SURGERY

## 2017-02-23 PROCEDURE — 74011250636 HC RX REV CODE- 250/636: Performed by: ANESTHESIOLOGY

## 2017-02-23 PROCEDURE — 77030002966 HC SUT PDS J&J -A: Performed by: SURGERY

## 2017-02-23 PROCEDURE — 74011250636 HC RX REV CODE- 250/636

## 2017-02-23 PROCEDURE — 77030002986 HC SUT PROL J&J -A: Performed by: SURGERY

## 2017-02-23 PROCEDURE — 81025 URINE PREGNANCY TEST: CPT

## 2017-02-23 PROCEDURE — 76210000034 HC AMBSU PH I REC 0.5 TO 1 HR: Performed by: SURGERY

## 2017-02-23 PROCEDURE — 0HTT0ZZ RESECTION OF RIGHT BREAST, OPEN APPROACH: ICD-10-PCS | Performed by: SURGERY

## 2017-02-23 PROCEDURE — 77030020263 HC SOL INJ SOD CL0.9% LFCR 1000ML: Performed by: SURGERY

## 2017-02-23 PROCEDURE — 0JPT0WZ REMOVAL OF TOTALLY IMPLANTABLE VASCULAR ACCESS DEVICE FROM TRUNK SUBCUTANEOUS TISSUE AND FASCIA, OPEN APPROACH: ICD-10-PCS | Performed by: SURGERY

## 2017-02-23 PROCEDURE — C1789 PROSTHESIS, BREAST, IMP: HCPCS | Performed by: SURGERY

## 2017-02-23 PROCEDURE — 77030032490 HC SLV COMPR SCD KNE COVD -B: Performed by: SURGERY

## 2017-02-23 PROCEDURE — 76030000006 HC AMB SURG OR TIME 3 TO 3.5: Performed by: SURGERY

## 2017-02-23 PROCEDURE — 77030010507 HC ADH SKN DERMBND J&J -B: Performed by: SURGERY

## 2017-02-23 PROCEDURE — 76210000057 HC AMBSU PH II REC 1 TO 1.5 HR: Performed by: SURGERY

## 2017-02-23 PROCEDURE — 77030002996 HC SUT SLK J&J -A: Performed by: SURGERY

## 2017-02-23 PROCEDURE — 74011250636 HC RX REV CODE- 250/636: Performed by: PLASTIC SURGERY

## 2017-02-23 PROCEDURE — 88309 TISSUE EXAM BY PATHOLOGIST: CPT | Performed by: SURGERY

## 2017-02-23 PROCEDURE — 77030020255 HC SOL INJ LR 1000ML BG: Performed by: SURGERY

## 2017-02-23 PROCEDURE — 77030013567 HC DRN WND RESERV BARD -A: Performed by: SURGERY

## 2017-02-23 PROCEDURE — 88360 TUMOR IMMUNOHISTOCHEM/MANUAL: CPT | Performed by: SURGERY

## 2017-02-23 PROCEDURE — 77030018846 HC SOL IRR STRL H20 ICUM -A: Performed by: SURGERY

## 2017-02-23 PROCEDURE — 77030010512 HC APPL CLP LIG J&J -C: Performed by: SURGERY

## 2017-02-23 PROCEDURE — 0HHT0NZ INSERTION OF TISSUE EXPANDER INTO RIGHT BREAST, OPEN APPROACH: ICD-10-PCS | Performed by: PLASTIC SURGERY

## 2017-02-23 PROCEDURE — 07T50ZZ RESECTION OF RIGHT AXILLARY LYMPHATIC, OPEN APPROACH: ICD-10-PCS | Performed by: SURGERY

## 2017-02-23 PROCEDURE — 0JPTXXZ REMOVAL OF TUNNELED VASCULAR ACCESS DEVICE FROM TRUNK SUBCUTANEOUS TISSUE AND FASCIA, EXTERNAL APPROACH: ICD-10-PCS | Performed by: SURGERY

## 2017-02-23 PROCEDURE — 77030010514 HC APPL CLP LIG COVD -B: Performed by: SURGERY

## 2017-02-23 PROCEDURE — 77030008684 HC TU ET CUF COVD -B: Performed by: NURSE ANESTHETIST, CERTIFIED REGISTERED

## 2017-02-23 PROCEDURE — 77030018836 HC SOL IRR NACL ICUM -A: Performed by: SURGERY

## 2017-02-23 PROCEDURE — 77030018850 HC STOCK ANTIEMB THG COVD -A: Performed by: SURGERY

## 2017-02-23 PROCEDURE — 65270000029 HC RM PRIVATE

## 2017-02-23 PROCEDURE — 77030031139 HC SUT VCRL2 J&J -A: Performed by: SURGERY

## 2017-02-23 PROCEDURE — 77030011640 HC PAD GRND REM COVD -A: Performed by: SURGERY

## 2017-02-23 PROCEDURE — 74011250637 HC RX REV CODE- 250/637: Performed by: PLASTIC SURGERY

## 2017-02-23 PROCEDURE — 77030002916 HC SUT ETHLN J&J -A: Performed by: SURGERY

## 2017-02-23 PROCEDURE — 76060000066 HC AMB SURG ANES 3 TO 3.5 HR: Performed by: SURGERY

## 2017-02-23 PROCEDURE — 77030033138 HC SUT PGA STRATFX J&J -B: Performed by: SURGERY

## 2017-02-23 PROCEDURE — 74011000250 HC RX REV CODE- 250

## 2017-02-23 PROCEDURE — 77030013674 HC FIL EXPND TISS ALGN -A: Performed by: SURGERY

## 2017-02-23 PROCEDURE — 77030011244 HC DRN WND HUBLS J&J -B: Performed by: SURGERY

## 2017-02-23 PROCEDURE — 77030005538 HC CATH URETH FOL44 BARD -B: Performed by: SURGERY

## 2017-02-23 PROCEDURE — 77030003029 HC SUT VCRL J&J -B: Performed by: SURGERY

## 2017-02-23 PROCEDURE — 77030002933 HC SUT MCRYL J&J -A: Performed by: SURGERY

## 2017-02-23 PROCEDURE — 77030011267 HC ELECTRD BLD COVD -A: Performed by: SURGERY

## 2017-02-23 DEVICE — TEXTURED, HIGH PROFILE, SUTURE TABS, INTEGRAL INJECTION DOME, 375CC
Type: IMPLANTABLE DEVICE | Site: BREAST | Status: FUNCTIONAL
Brand: ARTOURA BREAST TISSUE EXPANDER

## 2017-02-23 RX ORDER — HYDROMORPHONE HYDROCHLORIDE 2 MG/1
2 TABLET ORAL
Status: DISCONTINUED | OUTPATIENT
Start: 2017-02-23 | End: 2017-02-25 | Stop reason: HOSPADM

## 2017-02-23 RX ORDER — HYDROMORPHONE HYDROCHLORIDE 2 MG/1
4 TABLET ORAL
Status: DISCONTINUED | OUTPATIENT
Start: 2017-02-23 | End: 2017-02-25 | Stop reason: HOSPADM

## 2017-02-23 RX ORDER — ONDANSETRON 2 MG/ML
8 INJECTION INTRAMUSCULAR; INTRAVENOUS
Status: DISCONTINUED | OUTPATIENT
Start: 2017-02-23 | End: 2017-02-25 | Stop reason: HOSPADM

## 2017-02-23 RX ORDER — SODIUM CHLORIDE 0.9 % (FLUSH) 0.9 %
5-10 SYRINGE (ML) INJECTION AS NEEDED
Status: DISCONTINUED | OUTPATIENT
Start: 2017-02-23 | End: 2017-02-23 | Stop reason: HOSPADM

## 2017-02-23 RX ORDER — FENTANYL CITRATE 50 UG/ML
INJECTION, SOLUTION INTRAMUSCULAR; INTRAVENOUS AS NEEDED
Status: DISCONTINUED | OUTPATIENT
Start: 2017-02-23 | End: 2017-02-23 | Stop reason: HOSPADM

## 2017-02-23 RX ORDER — DEXAMETHASONE SODIUM PHOSPHATE 4 MG/ML
INJECTION, SOLUTION INTRA-ARTICULAR; INTRALESIONAL; INTRAMUSCULAR; INTRAVENOUS; SOFT TISSUE AS NEEDED
Status: DISCONTINUED | OUTPATIENT
Start: 2017-02-23 | End: 2017-02-23 | Stop reason: HOSPADM

## 2017-02-23 RX ORDER — LIDOCAINE HYDROCHLORIDE 10 MG/ML
0.1 INJECTION, SOLUTION EPIDURAL; INFILTRATION; INTRACAUDAL; PERINEURAL AS NEEDED
Status: DISCONTINUED | OUTPATIENT
Start: 2017-02-23 | End: 2017-02-23 | Stop reason: HOSPADM

## 2017-02-23 RX ORDER — PROPOFOL 10 MG/ML
INJECTION, EMULSION INTRAVENOUS AS NEEDED
Status: DISCONTINUED | OUTPATIENT
Start: 2017-02-23 | End: 2017-02-23 | Stop reason: HOSPADM

## 2017-02-23 RX ORDER — CEFAZOLIN SODIUM IN 0.9 % NACL 2 G/100 ML
2 PLASTIC BAG, INJECTION (ML) INTRAVENOUS EVERY 8 HOURS
Status: DISCONTINUED | OUTPATIENT
Start: 2017-02-23 | End: 2017-02-25 | Stop reason: HOSPADM

## 2017-02-23 RX ORDER — MORPHINE SULFATE 2 MG/ML
2 INJECTION, SOLUTION INTRAMUSCULAR; INTRAVENOUS
Status: DISCONTINUED | OUTPATIENT
Start: 2017-02-23 | End: 2017-02-25 | Stop reason: HOSPADM

## 2017-02-23 RX ORDER — FENTANYL CITRATE 50 UG/ML
25 INJECTION, SOLUTION INTRAMUSCULAR; INTRAVENOUS
Status: DISCONTINUED | OUTPATIENT
Start: 2017-02-23 | End: 2017-02-23 | Stop reason: HOSPADM

## 2017-02-23 RX ORDER — ROCURONIUM BROMIDE 10 MG/ML
INJECTION, SOLUTION INTRAVENOUS AS NEEDED
Status: DISCONTINUED | OUTPATIENT
Start: 2017-02-23 | End: 2017-02-23 | Stop reason: HOSPADM

## 2017-02-23 RX ORDER — HYDROMORPHONE HYDROCHLORIDE 2 MG/1
4 TABLET ORAL
Status: DISCONTINUED | OUTPATIENT
Start: 2017-02-23 | End: 2017-02-23

## 2017-02-23 RX ORDER — PHENYLEPHRINE HCL IN 0.9% NACL 0.4MG/10ML
SYRINGE (ML) INTRAVENOUS AS NEEDED
Status: DISCONTINUED | OUTPATIENT
Start: 2017-02-23 | End: 2017-02-23 | Stop reason: HOSPADM

## 2017-02-23 RX ORDER — BUPIVACAINE HYDROCHLORIDE 2.5 MG/ML
20 INJECTION, SOLUTION EPIDURAL; INFILTRATION; INTRACAUDAL ONCE
Status: DISCONTINUED | OUTPATIENT
Start: 2017-02-23 | End: 2017-02-23 | Stop reason: HOSPADM

## 2017-02-23 RX ORDER — LIDOCAINE HYDROCHLORIDE AND EPINEPHRINE 10; 10 MG/ML; UG/ML
1.5 INJECTION, SOLUTION INFILTRATION; PERINEURAL ONCE
Status: DISCONTINUED | OUTPATIENT
Start: 2017-02-23 | End: 2017-02-23 | Stop reason: HOSPADM

## 2017-02-23 RX ORDER — DOCUSATE SODIUM 100 MG/1
100 CAPSULE, LIQUID FILLED ORAL 2 TIMES DAILY
Status: DISCONTINUED | OUTPATIENT
Start: 2017-02-23 | End: 2017-02-25 | Stop reason: HOSPADM

## 2017-02-23 RX ORDER — SODIUM CHLORIDE 0.9 % (FLUSH) 0.9 %
5-10 SYRINGE (ML) INJECTION EVERY 8 HOURS
Status: DISCONTINUED | OUTPATIENT
Start: 2017-02-23 | End: 2017-02-23 | Stop reason: HOSPADM

## 2017-02-23 RX ORDER — LIDOCAINE HYDROCHLORIDE 20 MG/ML
INJECTION, SOLUTION EPIDURAL; INFILTRATION; INTRACAUDAL; PERINEURAL AS NEEDED
Status: DISCONTINUED | OUTPATIENT
Start: 2017-02-23 | End: 2017-02-23 | Stop reason: HOSPADM

## 2017-02-23 RX ORDER — SODIUM CHLORIDE, SODIUM LACTATE, POTASSIUM CHLORIDE, CALCIUM CHLORIDE 600; 310; 30; 20 MG/100ML; MG/100ML; MG/100ML; MG/100ML
25 INJECTION, SOLUTION INTRAVENOUS CONTINUOUS
Status: DISCONTINUED | OUTPATIENT
Start: 2017-02-23 | End: 2017-02-23 | Stop reason: HOSPADM

## 2017-02-23 RX ORDER — SODIUM CHLORIDE, SODIUM LACTATE, POTASSIUM CHLORIDE, CALCIUM CHLORIDE 600; 310; 30; 20 MG/100ML; MG/100ML; MG/100ML; MG/100ML
125 INJECTION, SOLUTION INTRAVENOUS CONTINUOUS
Status: DISCONTINUED | OUTPATIENT
Start: 2017-02-23 | End: 2017-02-24

## 2017-02-23 RX ORDER — GLYCOPYRROLATE 0.2 MG/ML
INJECTION INTRAMUSCULAR; INTRAVENOUS AS NEEDED
Status: DISCONTINUED | OUTPATIENT
Start: 2017-02-23 | End: 2017-02-23 | Stop reason: HOSPADM

## 2017-02-23 RX ORDER — ONDANSETRON 2 MG/ML
INJECTION INTRAMUSCULAR; INTRAVENOUS AS NEEDED
Status: DISCONTINUED | OUTPATIENT
Start: 2017-02-23 | End: 2017-02-23 | Stop reason: HOSPADM

## 2017-02-23 RX ORDER — ACETAMINOPHEN 10 MG/ML
INJECTION, SOLUTION INTRAVENOUS
Status: COMPLETED
Start: 2017-02-23 | End: 2017-02-23

## 2017-02-23 RX ORDER — DIAZEPAM 5 MG/1
5 TABLET ORAL EVERY 8 HOURS
Status: DISCONTINUED | OUTPATIENT
Start: 2017-02-23 | End: 2017-02-25 | Stop reason: HOSPADM

## 2017-02-23 RX ORDER — HEPARIN SODIUM 5000 [USP'U]/ML
5000 INJECTION, SOLUTION INTRAVENOUS; SUBCUTANEOUS EVERY 12 HOURS
Status: DISCONTINUED | OUTPATIENT
Start: 2017-02-23 | End: 2017-02-25 | Stop reason: HOSPADM

## 2017-02-23 RX ORDER — HYDROMORPHONE HYDROCHLORIDE 2 MG/ML
INJECTION, SOLUTION INTRAMUSCULAR; INTRAVENOUS; SUBCUTANEOUS AS NEEDED
Status: DISCONTINUED | OUTPATIENT
Start: 2017-02-23 | End: 2017-02-23 | Stop reason: HOSPADM

## 2017-02-23 RX ORDER — HYDROMORPHONE HYDROCHLORIDE 1 MG/ML
.2-.5 INJECTION, SOLUTION INTRAMUSCULAR; INTRAVENOUS; SUBCUTANEOUS ONCE
Status: DISCONTINUED | OUTPATIENT
Start: 2017-02-23 | End: 2017-02-23 | Stop reason: HOSPADM

## 2017-02-23 RX ORDER — FENTANYL CITRATE 50 UG/ML
INJECTION, SOLUTION INTRAMUSCULAR; INTRAVENOUS
Status: COMPLETED
Start: 2017-02-23 | End: 2017-02-23

## 2017-02-23 RX ORDER — CEFAZOLIN SODIUM IN 0.9 % NACL 2 G/100 ML
2 PLASTIC BAG, INJECTION (ML) INTRAVENOUS
Status: COMPLETED | OUTPATIENT
Start: 2017-02-23 | End: 2017-02-23

## 2017-02-23 RX ORDER — MIDAZOLAM HYDROCHLORIDE 1 MG/ML
INJECTION, SOLUTION INTRAMUSCULAR; INTRAVENOUS AS NEEDED
Status: DISCONTINUED | OUTPATIENT
Start: 2017-02-23 | End: 2017-02-23 | Stop reason: HOSPADM

## 2017-02-23 RX ORDER — OXYCODONE AND ACETAMINOPHEN 5; 325 MG/1; MG/1
1 TABLET ORAL ONCE
Status: DISCONTINUED | OUTPATIENT
Start: 2017-02-23 | End: 2017-02-23 | Stop reason: HOSPADM

## 2017-02-23 RX ORDER — ACETAMINOPHEN 10 MG/ML
1000 INJECTION, SOLUTION INTRAVENOUS
Status: COMPLETED | OUTPATIENT
Start: 2017-02-23 | End: 2017-02-23

## 2017-02-23 RX ORDER — NEOSTIGMINE METHYLSULFATE 1 MG/ML
INJECTION INTRAVENOUS AS NEEDED
Status: DISCONTINUED | OUTPATIENT
Start: 2017-02-23 | End: 2017-02-23 | Stop reason: HOSPADM

## 2017-02-23 RX ORDER — MORPHINE SULFATE 10 MG/ML
2 INJECTION, SOLUTION INTRAMUSCULAR; INTRAVENOUS
Status: DISCONTINUED | OUTPATIENT
Start: 2017-02-23 | End: 2017-02-23 | Stop reason: HOSPADM

## 2017-02-23 RX ORDER — DIPHENHYDRAMINE HYDROCHLORIDE 50 MG/ML
12.5 INJECTION, SOLUTION INTRAMUSCULAR; INTRAVENOUS AS NEEDED
Status: DISCONTINUED | OUTPATIENT
Start: 2017-02-23 | End: 2017-02-23 | Stop reason: HOSPADM

## 2017-02-23 RX ADMIN — CEFAZOLIN 2 G: 10 INJECTION, POWDER, FOR SOLUTION INTRAVENOUS; PARENTERAL at 12:30

## 2017-02-23 RX ADMIN — SODIUM CHLORIDE, SODIUM LACTATE, POTASSIUM CHLORIDE, AND CALCIUM CHLORIDE 125 ML/HR: 600; 310; 30; 20 INJECTION, SOLUTION INTRAVENOUS at 18:14

## 2017-02-23 RX ADMIN — FENTANYL CITRATE 25 MCG: 50 INJECTION, SOLUTION INTRAMUSCULAR; INTRAVENOUS at 16:04

## 2017-02-23 RX ADMIN — DOCUSATE SODIUM 100 MG: 100 CAPSULE, LIQUID FILLED ORAL at 18:00

## 2017-02-23 RX ADMIN — NEOSTIGMINE METHYLSULFATE 2 MG: 1 INJECTION INTRAVENOUS at 15:20

## 2017-02-23 RX ADMIN — PROPOFOL 200 MG: 10 INJECTION, EMULSION INTRAVENOUS at 12:21

## 2017-02-23 RX ADMIN — Medication 80 MCG: at 13:30

## 2017-02-23 RX ADMIN — FENTANYL CITRATE 100 MCG: 50 INJECTION, SOLUTION INTRAMUSCULAR; INTRAVENOUS at 12:21

## 2017-02-23 RX ADMIN — FENTANYL CITRATE 25 MCG: 50 INJECTION, SOLUTION INTRAMUSCULAR; INTRAVENOUS at 16:39

## 2017-02-23 RX ADMIN — HYDROMORPHONE HYDROCHLORIDE 4 MG: 2 TABLET ORAL at 18:13

## 2017-02-23 RX ADMIN — HYDROMORPHONE HYDROCHLORIDE 1 MG: 2 INJECTION, SOLUTION INTRAMUSCULAR; INTRAVENOUS; SUBCUTANEOUS at 13:05

## 2017-02-23 RX ADMIN — ONDANSETRON 4 MG: 2 INJECTION INTRAMUSCULAR; INTRAVENOUS at 12:30

## 2017-02-23 RX ADMIN — SODIUM CHLORIDE, SODIUM LACTATE, POTASSIUM CHLORIDE, AND CALCIUM CHLORIDE 25 ML/HR: 600; 310; 30; 20 INJECTION, SOLUTION INTRAVENOUS at 10:50

## 2017-02-23 RX ADMIN — HYDROMORPHONE HYDROCHLORIDE 0.5 MG: 2 INJECTION, SOLUTION INTRAMUSCULAR; INTRAVENOUS; SUBCUTANEOUS at 14:45

## 2017-02-23 RX ADMIN — Medication 80 MCG: at 13:55

## 2017-02-23 RX ADMIN — ROCURONIUM BROMIDE 40 MG: 10 INJECTION, SOLUTION INTRAVENOUS at 12:21

## 2017-02-23 RX ADMIN — DEXAMETHASONE SODIUM PHOSPHATE 4 MG: 4 INJECTION, SOLUTION INTRA-ARTICULAR; INTRALESIONAL; INTRAMUSCULAR; INTRAVENOUS; SOFT TISSUE at 12:30

## 2017-02-23 RX ADMIN — LIDOCAINE HYDROCHLORIDE 100 MG: 20 INJECTION, SOLUTION EPIDURAL; INFILTRATION; INTRACAUDAL; PERINEURAL at 12:21

## 2017-02-23 RX ADMIN — HYDROMORPHONE HYDROCHLORIDE 4 MG: 2 TABLET ORAL at 22:29

## 2017-02-23 RX ADMIN — ACETAMINOPHEN 1000 MG: 10 INJECTION, SOLUTION INTRAVENOUS at 16:04

## 2017-02-23 RX ADMIN — DIAZEPAM 5 MG: 5 TABLET ORAL at 20:33

## 2017-02-23 RX ADMIN — CEFAZOLIN 2 G: 10 INJECTION, POWDER, FOR SOLUTION INTRAVENOUS; PARENTERAL at 20:28

## 2017-02-23 RX ADMIN — HEPARIN SODIUM 5000 UNITS: 5000 INJECTION, SOLUTION INTRAVENOUS; SUBCUTANEOUS at 20:33

## 2017-02-23 RX ADMIN — SODIUM CHLORIDE, SODIUM LACTATE, POTASSIUM CHLORIDE, AND CALCIUM CHLORIDE: 600; 310; 30; 20 INJECTION, SOLUTION INTRAVENOUS at 12:16

## 2017-02-23 RX ADMIN — MIDAZOLAM HYDROCHLORIDE 2 MG: 1 INJECTION, SOLUTION INTRAMUSCULAR; INTRAVENOUS at 12:16

## 2017-02-23 RX ADMIN — HYDROMORPHONE HYDROCHLORIDE 0.5 MG: 2 INJECTION, SOLUTION INTRAMUSCULAR; INTRAVENOUS; SUBCUTANEOUS at 15:21

## 2017-02-23 RX ADMIN — FENTANYL CITRATE 25 MCG: 50 INJECTION, SOLUTION INTRAMUSCULAR; INTRAVENOUS at 16:33

## 2017-02-23 RX ADMIN — FENTANYL CITRATE 25 MCG: 50 INJECTION, SOLUTION INTRAMUSCULAR; INTRAVENOUS at 15:59

## 2017-02-23 RX ADMIN — GLYCOPYRROLATE 0.3 MG: 0.2 INJECTION INTRAMUSCULAR; INTRAVENOUS at 15:20

## 2017-02-23 NOTE — OP NOTES
[address], [address]   OP NOTE       Name:  Rena Person   MR#:  [id number]   :  1965   Account #:  [de-identified]    Surgery Date:  2017   Date of Adm:  2017       PREOPERATIVE DIAGNOSIS: Right breast cancer, upper outer   quadrant with axillary mass. POSTOPERATIVE DIAGNOSIS: Right breast cancer, upper outer   quadrant with axillary mass. PROCEDURE PERFORMED: Right breast modified radical   mastectomy port removal.    SURGEON: Ishmael Dean MD.    ANESTHESIA: General.    ESTIMATED BLOOD LOSS: 100 mL. SPECIMENS REMOVED:   1. Right breast.   2. Right axillary contents. FINDINGS: Right breast nodes removed. COMPLICATIONS: None. IMPLANTS: None. INDICATIONS FOR PROCEDURE: This is a 35-year-old female that   presented with a T2 N1 triple negative breast cancer of the right   breast.  Initially she had biopsy of this. She was seen by medical   oncology, a port was placed, and she was given neoadjuvant   chemotherapy. Since it was multifocal in nature, scheduled for a   mastectomy and axillary lymph node dissection with the port removal.    DESCRIPTION OF PROCEDURE: The patient was taken to the   preoperative holding. Her surgical site was marked by surgeon and   informed consent was obtained. She was then taken to the operating   room, laid in supine position, where general endotracheal anesthesia   was induced. Time-out performed. Attention was turned to the right   breast, and using a 10 blade, an elliptical incision was made around   nipple-areola complex. PlasmaBlade was used to dissect the superior,   medial, inferior, and lateral skin flaps. Upon the dissection of the   superior flap, the port was identified from inside the flap and the port   capsule was incised using a Bovie cautery. Prolene sutures on either   side of the port were cut with heavy scissors and the port was   removed.  Pressure applied for 5 minutes. Next the breast was removed   in total from the chest wall in a medial to lateral fashion, including the   pectoral fascia. This was marked short superior, long stitch lateral.   Next attention was turned to the right axilla. Bovie cautery was used to   free the pectoralis minor and latissimus dorsi muscle. Blunt dissection   was used to identify the axillary vein. The thoracodorsal bundle and   long thoracic nerve were identified and preserved. Two   intercostal brachial nerves were identified and preserved. The axillary   contents were removed using the LigaSure device. This was sent for   permanent pathology. Bovie cautery was used to obtain hemostasis. The chest was packed with a moist lap. All sponge, needle, instrument   counts were correct. The patient was stable during this part of the   procedure.         MD WARREN Bhatia / Bal   D:  02/23/2017   14:06   T:  02/23/2017   14:25   Job #:  [id number]

## 2017-02-23 NOTE — ANESTHESIA POSTPROCEDURE EVALUATION
Post-Anesthesia Evaluation and Assessment    Patient: Aamir Waddell MRN: [id number]  SSN: [id number]    YOB: 1965  Age: 46 y.o. Sex: female       Cardiovascular Function/Vital Signs  Visit Vitals    /83    Pulse 65    Temp 37.1 °C (98.8 °F)    Resp 12    Ht 5' 6\" (1.676 m)    Wt 93.1 kg (205 lb 3.2 oz)    SpO2 98%    BMI 33.12 kg/m2       Patient is status post general anesthesia for Procedure(s):  RIGHT BREAST TOTAL MASTECTOMY, AXILLARY LYMPH NODE DISSECTION, PORTACATH REMOVAL  RIGHT BREAST RECONSTRUCTION  /  TISSUE EXPANDER / ALLODERM . Nausea/Vomiting: None    Postoperative hydration reviewed and adequate. Pain:  Pain Scale 1: Numeric (0 - 10) (02/23/17 1645)  Pain Intensity 1: 3 (02/23/17 1645)   Managed    Neurological Status:   Neuro (WDL): Exceptions to WDL (02/23/17 1605)  Neuro  Neurologic State: Drowsy (02/23/17 1605)   At baseline    Mental Status and Level of Consciousness: Arousable    Pulmonary Status:   O2 Device: Nasal cannula (02/23/17 1605)   Adequate oxygenation and airway patent    Complications related to anesthesia: None    Post-anesthesia assessment completed. No concerns. Stable for transfer to floor for planned admission.     Signed By: Ryan Alonzo MD     February 23, 2017

## 2017-02-23 NOTE — PERIOP NOTES
Pt in recovery, noted [facility] not charged, no drainage in bulb or tubing, recharged drain, immediate serosanguinous drainage returned into bulb. Blood pressure elevated, pt reporting \"I need to pee\" , pt placed on bedpan, Dr Latonya Carvajal notified regarding drain not draining. Dr Latonya Carvajal ordered for xeroform dressing and 4x4s to be placed at the insertion site and Drain will charge with time. 65-  upset in waiting area, wants to see pt. Explained to  and family her pain control is a priority at this time, once under control they will be able to visit. 26- Family visited pt briefly, she awoke during their presence and conversed before eventually dozing back off.    1645-Pt now reports pain is now a 3 on the pain scale, continue to charge katherin drain approximately q 20min. VSS  Pt dozing on and off.    1657- Report called to receiving Nurse, Nancy Sandoval. Pt resting at this time. 1710- called and notified of Room number, pt tolerating ice chips and sip of water. VSS. Reports pain is around a 2 at this time. Katherin drain emptied and recharged at this time. 1740- Assisted pt to bathroom, voided yellow urine in bathroom.     1746-Assisted pt back to bed and posititoned, recharged katherin drain, handoff given to [employer]

## 2017-02-23 NOTE — ANESTHESIA PREPROCEDURE EVALUATION
Anesthetic History   No history of anesthetic complications            Review of Systems / Medical History  Patient summary reviewed, nursing notes reviewed and pertinent labs reviewed    Pulmonary  Within defined limits                 Neuro/Psych   Within defined limits           Cardiovascular  Within defined limits                Exercise tolerance: >4 METS  Comments: 07/16 ECHO: EF 64%   GI/Hepatic/Renal  Within defined limits              Endo/Other        Cancer (right breast; finished chemotx)    Comments: H/o benign thyroid nodule Other Findings            Physical Exam    Airway  Mallampati: I  TM Distance: 4 - 6 cm  Neck ROM: normal range of motion   Mouth opening: Normal     Cardiovascular    Rhythm: regular  Rate: normal      Pertinent negatives: No murmur   Dental      Comments: Permanent upper front retainer   Pulmonary  Breath sounds clear to auscultation               Abdominal  GI exam deferred       Other Findings            Anesthetic Plan    ASA: 2  Anesthesia type: general    Monitoring Plan: BIS      Induction: Intravenous  Anesthetic plan and risks discussed with: Patient      Planned admission

## 2017-02-23 NOTE — PERIOP NOTES
Dony Folds  1965  [id number]    Situation:  Verbal report given from: EREN Fabian CRNA, RN  Procedure: Procedure(s):  RIGHT BREAST TOTAL MASTECTOMY, AXILLARY LYMPH NODE DISSECTION, PORTACATH REMOVAL  RIGHT BREAST RECONSTRUCTION  /  TISSUE EXPANDER / ALLODERM     Background:    Preoperative diagnosis: BREAST CA    Postoperative diagnosis: BREAST CA    :  Dr. Alejandra Mcardle    Assistant(s): Circ-1: Katarina Michael RN  Circ-2: Murray Bangura  Circ-Relief: Kathya Frausto RN; Padilla Leon RN  Scrub Tech-1: Verónica Reynoso  Scrub Tech-Relief: Devonte Romano, RT  Surg Asst-1: Annamaria Cam    Specimens:   ID Type Source Tests Collected by Time Destination   1 : RIGHT BREAST Preservative Breast  Emilia Lowe MD 2/23/2017 1331 Pathology   2 : Sunshine Velasquez MD 2/23/2017 1336 Pathology       Assessment:  Intra-procedure medications         Anesthesia gave intra-procedure sedation and medications, see anesthesia flow sheet     Intravenous fluids: LR@ KVO     Vital signs stable       Recommendation:    Permission to share finding with family or friend yes

## 2017-02-23 NOTE — IP AVS SNAPSHOT
[address] [facility] 
[phone] Patient: Shyla Navarro MRN: [id number] :1965 You are allergic to the following No active allergies Recent Documentation Height  
  
  
  
  
  
 1.676 m Emergency Contacts Name Discharge Info Relation Home Work Mobile Homero Koch DISCHARGE CAREGIVER [3] Spouse [3] [phone] About your hospitalization You were admitted on:  2017 You last received care in the:  [facility] 2 GENERAL SURGERY You were discharged on:  2017 Unit phone number:  [phone] Why you were hospitalized Your primary diagnosis was:  Not on File Your diagnoses also included:  Breast Ca (Hcc) Providers Seen During Your Hospitalizations Provider Role Specialty Primary office phone Donny Rodriguez MD Attending Provider Breast Surgery [phone] Elina Oconnor MD Attending Provider Plastic Surgery [phone] Your Primary Care Physician (PCP) Primary Care Physician Office Phone Office Fax Dauna Glow [phone] Follow-up Information Follow up With Details Comments Contact Info Elina Oconnor MD In 2 weeks  [address] [facility] [address] [facility] 57 
[phone] Donny Rodriguez MD In 2 weeks  [address] [place] [address] [address] 1400 [address] [place] [address] 
[phone] Laisha Haji MD   [facility] [address] [facility] 
[phone] Your Appointments 2017  9:00 AM EST  
RADIATION ONCOLOGY with RAD ONC THERAPY [facility] RADIATION THERAPY ([address]) [address]  
[phone] Patient should report to outpatient registration ([address] [place] [address] Road) 30 minutes prior to the appointment time unless instructed otherwise. (NOT FOR MRI) Thursday March 09, 2017  9:00 AM EST Follow Up with Bina Frazier  [facility] Oncology at [facility]) [address] [facility] [address]  
[phone] Friday March 10, 2017  8:45 AM EST  
POST OP with MD [address] ([address] [facility]) [address] [place] [address] [address] [place] [address]  
[phone] Current Discharge Medication List  
  
CONTINUE these medications which have NOT CHANGED Dose & Instructions Dispensing Information Comments Morning Noon Evening Bedtime  
 ferrous sulfate 324 mg (65 mg iron) tablet Your next dose is: Today, Tomorrow Other:  _________ Take  by mouth Daily (before breakfast). Refills:  0  
     
   
   
   
  
 ondansetron 8 mg disintegrating tablet Commonly known as:  ZOFRAN ODT Your next dose is: Today, Tomorrow Other:  _________ Dose:  8 mg Take 1 Tab by mouth every eight (8) hours as needed for Nausea. Indications: CANCER CHEMOTHERAPY-INDUCED NAUSEA AND VOMITING Quantity:  30 Tab Refills:  5 OTHER(NON-FORMULARY) Your next dose is: Today, Tomorrow Other:  _________ Dose:  1 Tab Take 1 Tab by mouth three (3) times daily. Probiotic 11 and protease Refills:  0  
     
   
   
   
  
 prochlorperazine 10 mg tablet Commonly known as:  COMPAZINE Your next dose is: Today, Tomorrow Other:  _________ Dose:  10 mg Take 1 Tab by mouth every six (6) hours as needed. Indications: CANCER CHEMOTHERAPY-INDUCED NAUSEA AND VOMITING Quantity:  30 Tab Refills:  5 VITAMIN D3 1,000 unit Cap Generic drug:  cholecalciferol Your next dose is: Today, Tomorrow Other:  _________ Take  by mouth daily. Refills:  0 STOP taking these medications   
 lidocaine-prilocaine topical cream  
Commonly known as:  EMLA LORazepam 0.5 mg tablet Commonly known as:  ATIVAN Discharge Instructions Cornell Rinne Verdell Downy, M.D., F.A.C.S. SHALA MinisterioMarla [phone] Tissue Expander Breast Reconstruction Post-operative Instructions 1. Surgical Bra:  You will be in a surgical bra following the procedure. This should be worn at all times except when showering for the first two weeks. The bra should be washed when it gets soiled. If the surgibra is irritating, you may place gauze pads between your skin and the bra for added comfort. After you are discharged home, you may wear a loose-fitting, sports-type bra that has NO UNDERWIRE. This should be worn day and night, except when showering, for the first month. 2. Surgical Drains:  Please refer to the GARRET Drain Instructions sheet for details. 3. Activity:  Take it easy for the first several days. No cleaning, housework, or strenuous activity. Do not lift anything over 10 pounds, including children, and do not lift your hands over your head. You may resume non-vigorous activities at two weeks, then gently increase your activity as tolerated. No running, aerobics, or other strenuous activities until four weeks. You should avoid bench pressing or pec deck exercises in the future. 4. Bathing: You may shower two days after the surgery. NO tub baths, hot tubs, swimming, or any submersion in water for one week after removal of all GARRET drains. Skin glue will be covering the incisions, just let the shower water run over them, then pat everything dry. Skin glue usually falls off on its own in about 1-2 weeks. You may remove it after two weeks if it is still present. 5. Medication:  You will receive prescriptions for an antibiotic, pain medication, and a muscle relaxant (valium).   Take the antibiotic until it is finished, the valium as directed, and the pain medication as needed according to the directions. Avoid aspirin and non-steroidal anti-inflammatories (e.g. ibuprofen, advil, etc.) for two weeks after surgery. 6. Expansion:  The tissue expander will typically have some saline fluid placed in it at the time of surgery. The expansion process, to stretch the skin and create the new breast, is performed by filling the tissue expander during office visits. This usually begins approximately three weeks after your surgery. 7. Things to watch for: If you experience excessive or sudden swelling, spreading or increasing redness, increasing pain, foul-smelling drainage, separation of any incisions, fever, shaking chills, or any other concerns, please call the office immediately ([phone]). 8. Follow-up appointment: please call the office at [phone] during regular business hours to schedule an appointment in approximately 2 weeks. Nelly Schafer M.D., F.A.C.S. D.[facility] [phone] GARRET Drain Instructions Purpose: You have had surgery during which a Angel-Lopez drain, or GARRET drain, has been placed by your surgeon. A GARRET drain is a rubber tube which goes under the skin and drains excess fluid during the healing process so that this fluid does not accumulate. There is a one-way valve which allows the fluid to collect in the bulb on the end of the drain. The drain is usually held in place by a single stitch. The color of the drainage can range from reddish to pink to straw-colored. Care of the drain:  Caring for the GARRET drain is fairly easy. First, protect the drain so that it does not get pulled inadvertently. You may fasten them to your clothing with a safety pin through the floppy tag on the bulb. DO NOT place a pin through either the drain tubing or the bulb itself.   The drain works by maintaining a constant low pressure of suction when the bulb is in the collapsed (squeezed in) position. If the bulb will not maintain this collapsed position when it is recapped, please call the office, as the drain may not be working properly. If you had an On-Q® pain pump placed during your surgery, you may wish to keep the black tim pack once the On-Q® has been removed to carry the GARRET drain bulbs. Measuring the drainage:  Grasp the bulb in one hand and remove the cap with the other hand. This will cause the bulb to relax into a round shape. Holding the open end over a specimen cup, squirt the fluid into the cup by squeezing the bulb. Once the bulb is empty, squeeze it in (collapse it) with one hand, and replace the cap with your other hand. Then holding the measuring cup level, note how much fluid there is (in milliliters, mL), and record this number on the chart below. Discard the fluid in the toilet and rinse out the specimen cup. Note: your specimen cup may be in cubic centimeters (cc). 1 cc = 1 mL. Removal:  The GARRET drain will be removed in the office when the daily drainage is at a low enough level. You may be asked to call the office with your measuring totals to determine if the drain(s) is (are) ready to be removed. Removal involves minimal discomfort without the need for anesthesia. The drain site in the skin heals on its own once the drain is removed without any further stitches. Showering: Your surgeon may give you permission to shower while you have one or more GARRET drains in. Just let the water run over the drain sites and then pat them dry when you are done. Some patients like to place a long string necklace around their necks during showers to which they can safety pin the tag on the bulb to prevent it from dangling. DO NOT take a tub bath, go swimming (pool or lake/ocean), or otherwise submerge your body in water before all GARRET drains have been removed and you are permitted by your surgeon. Things to look out for:  Please call the office immediately ([phone]) if you notice: 
? Sudden bright or dark red bleeding into the bulb or around the drain site in the skin ? Dislodgment of the GARRET drain or if the drain falls out ? Spreading redness around the drain site in the skin ? Cloudy or foul-smelling drainage in the bulb or around the drain site ? Fever, shaking chills, excessive swelling, pain or discomfort ? Any other concerns or questions Date #1 (AM) #1 (PM) Daily Total #1 
(AM+PM) #2 (AM) #2 (PM) Daily Total 
#2 
(AM+PM) Discharge Orders None Introducing [facility]! Dear Cali Ng: 
Thank you for requesting a [employer] account. Our records indicate that you already have an active [employer] account. You can access your account anytime at [email or web address] Did you know that you can access your hospital and ER discharge instructions at any time in [employer]? You can also review all of your test results from your hospital stay or ER visit. Additional Information If you have questions, please visit the Frequently Asked Questions section of the [employer] website at [email or web address] Remember, [employer] is NOT to be used for urgent needs. For medical emergencies, dial 911. Now available from your iPhone and Android! General Information Please provide this summary of care documentation to your next provider. Patient Signature:  ____________________________________________________________ Date:  ____________________________________________________________  
  
Beverly Stafford Provider Signature:  ____________________________________________________________ Date:  ____________________________________________________________

## 2017-02-23 NOTE — PERIOP NOTES
TRANSFER - OUT REPORT:    Verbal report given to Vin Bartholomew RN(name) on Casey Fleming  being transferred to [facility] for routine post - op       Report consisted of patients Situation, Background, Assessment and   Recommendations(SBAR). Information from the following report(s) SBAR, Kardex, OR Summary, Intake/Output and MAR was reviewed with the receiving nurse. Opportunity for questions and clarification was provided.       Patient transported with:   O2 @ 2l/nc liters  Registered Nurse

## 2017-02-23 NOTE — PROGRESS NOTES
Primary Nurse Lilia Dominique and JAM Bray performed a dual skin assessment on this patient No impairment noted  Jimmy score is 23

## 2017-02-23 NOTE — OP NOTES
Name: [facility]  Surgeon: Caren Manjarrez MD   Account #: [de-identified] Surgery Date: 2017  : 1965  Age: 46 y.o. Location: [facility]    OPERATIVE REPORT     PREOPERATIVE DIAGNOSES:   1. Right breast cancer. 2. Acquired absence of right breast.    POSTOPERATIVE DIAGNOSES:   1. Right breast cancer. 2. Acquired absence of right breast.    OPERATIVE PROCEDURE:   1. Immediate right breast reconstruction with prepectoral tissue expander placement. 2.  Acellular dermal allograft to right breast.     SURGEON:  Jung Bai MD    ASSISTANT: Surinder Caldwell SA     ANESTHESIA: General endotracheal.     INDICATIONS: The patient is a 46 y.o. female who was diagnosed with a breast cancer on the right side. She was planning on mastectomy on that side with Dr. Tuan Lemus. She had undergone neoadjuvant chemotherapy. Furthermore, radiation therapy is likely. She was an excellent candidate for immediate breast reconstruction. The pros and cons of both tissue expander/ implant-based reconstruction as well as autologous tissue reconstruction were discussed at length. The decision was made to proceed with first stage immediate breast reconstruction with tissue expander and acellular dermal allograft. This procedure, as well as the alternatives, possible complications, and anticipated scars were outlined with the patient, and she agrees to proceed, having given her informed and written consent. She understands that post-operative radiotherapy might be indicated, and its possible effects and impact on reconstruction have been discussed. PROCEDURE IN DETAIL: The patient was positively identified pre-operatively, and she was marked in the upright position for a Wise pattern approach, with an inverted V in the breast meridian for de-epithelialization. She received pre-operative intravenous antibiotics.   She was taken to the operating room and placed in the supine position with all pressure points padded. Pneumatic compression stockings were applied to bilateral lower extremities. Following successful endotracheal anesthesia, a klein catheter was placed. The arms were secured to padded arm boards. The chest was prepped and draped in the usual sterile fashion. At this point she underwent modified radical mastectomy on the right by Dr. Lezama, as well as port removal.  This will be dictated separately by her. At the conclusion of her portion of the procedure, I presented to the operating room and scrubbed in. A new set of instruments was used. Additional sterile drapes were placed over the existing ones. The right breast pocket was inspected and hemostasis assured. The inframammary fold was recreated with interrupted #3-0 vicryl sutures to the chest wall. Next the prepectoral pocket was measured and marked. A 16 x 20 cm Alloderm ready to use graft was rinsed in saline. Perforations were made with a #15 blade scalpel. It was properly oriented and placed into the breast pocket. It was secured with a 2-3 cm cuff at the inframammary fold with #3-0 vicryl sutures. The pocket was again measured and a tissue expander selected. The surgeon was the only person to handle the expander. It was bathed in bacitracin saline solution. The integrated fill port was accessed with a 20 ga. hypodermic needle, and all air was evacuated. The tissue expander was placed with proper orientation in the breast pocket. It was secured with #3-0 prolene sutures at the medial, inferior and lateral tab positions. The alloderm graft was draped up over the expander, and the excess was marked and trimmed. The alloderm was secured medially, superiorly, and laterally to the chest wall with #2-0 vicryl suture in running whipstitch fashion.   A 19-Fr katherin drain was placed in the dependent portion of the breast pocket, brought out through a separate #15 blade stab incision in the lateral inframammary fold, and secured with a #2-0 prolene suture. Next, the integrated fill port of the right breast tissue expander was accessed with the accompanying butterfly needle, assisted with the magnetic finder as needed. It was filled to 300 mL with injectable normal saline. This did not place excess tension on the skin or deeper breast tissues. A thorough inspection of the breast pocket was performed and hemostasis assured. Skin edges had appropriate bleeding and a viable appearance. A lateral dog ear was marked and removed. The inverted V of the Wise pattern was de-epithelialized with a #10 blade scalpel. Skin closure was then performed with a deep dermal layer of #2-0 and #3-0 vicryl sutures, followed by #4-0 monocryl in running subcuticular fashion along the vertical scar, and with #2-0 monoderm barbed suture in running subcuticular fashion along the inframammary fold. Instrument counts were reported to be correct. The incision was cleaned, and skin glue was applied. The drain was hooked to bulb suction. A surgical bra was placed after the skin glue was dry. She was allowed to awaken from anesthesia and was extubated without apparent complication. She was taken to the recovery room in excellent condition. COMPLICATIONS: None. EBL: 20 mL     DRAINS: [place]iles x1. SPECIMENS: None for reconstructive portion of the procedure. DEVICE INFORMATION:      RIGHT BREAST: [employer] High Profile Breast Tissue Expander, Reference #: [id number], Serial #: [id number]. Nominal fill of 375 mL, today filled to 300 mL with injectable normal saline. AlloDerm Select Tissue Matrix RTU, 16 x 20 cm, Thick graft, Lot #: [id number], Reference #: [id number]. Cheryle Dutton Bess Kid, MD, FACS    CC:  Katelynn Workman MD

## 2017-02-23 NOTE — BRIEF OP NOTE
BRIEF OPERATIVE NOTE    Date of Procedure: 2/23/2017   Preoperative Diagnosis: r BREAST CA UPPER OUTER QUADRANT WITH AXILLARY METS  Postoperative Diagnosis: BREAST CA    Procedure(s):  Right breast modified radical mastectomy, port removal.   Surgeon(s) and Role:  Panel 1:     * Esther Villalobos MD - Primary    Panel 2:     * Jolena Ganser, MD - Primary            Surgical Staff:  Circ-1: Vane Watts RN  Circ-2: Viral Villatoro  Circ-Relief: Dona Sampson RN  Scrub Tech-1: Verónica Reynoso  Scrub Tech-Relief: RT Milad  Surg Asst-1: Forrestine Medal  Event Time In   Incision Start 1240   Incision Close      Anesthesia: General   Estimated Blood Loss: 100 ml  Specimens:   ID Type Source Tests Collected by Time Destination   1 : RIGHT BREAST Preservative Breast  Esther Villalobos MD 2/23/2017 1331 Pathology   2 : RIGHT AXILLARY CONTENTS Preservative Axillary  Esther Villalobos MD 2/23/2017 1336 Pathology      Findings: right breast and nodes removed.    Complications: none  Implants: * No implants in log *    [id number]

## 2017-02-23 NOTE — H&P
HISTORY OF PRESENT ILLNESS  Herber Petersen is a 46 y.o. female. HPI ESTABLISHED patient here to discuss surgery for RIGHT breast cancer. She recently completed neoadjuvant chemotherapy for RIGHT breast triple negative breast cancer. Last chemo was on 12/16/16. Denies any pain at this time. States that her RIGHT breast feels softer.     STAGE: T2N1 clinically triple neg with sammy met.      TREATMENT COURSE: Biopsy only Neoadjuvant chemo carbo/ taxol, DD AC     Review of Systems   All other systems reviewed and are negative.        Physical Exam   Pulmonary/Chest: Right breast exhibits no inverted nipple, no mass, no nipple discharge, no skin change and no tenderness. Left breast exhibits no inverted nipple, no mass, no nipple discharge, no skin change and no tenderness. Breasts are symmetrical.   Bilateral pendulous breasts. No appreciable masses or adenopathy today. Lymphadenopathy:   She has no cervical adenopathy. She has no axillary adenopathy. Nursing note and vitals reviewed.        ASSESSMENT and PLAN      ICD-10-CM ICD-9-CM     1. Breast cancer metastasized to axillary lymph node, right ([facility]) C50.911 174.9 MRI BREAST BI W WO CONT     C77.3 196. 3     2. Breast cancer of upper-outer quadrant of right female breast ([facility] [address] C50.411 174. 4        45 yo female with a right breast T2N1 triple negative breast cancer with sammy metastasis. She is here by herself.      30  minutes were spent face-to-face with the patient during this encounter and 90% of that time was spent on counseling and coordination of care.      - will order post [place] breast mri to assess response  - patient needs mastectomy, alnd, reconstruction  - will refer back to Dr. Lazaro Hidalgo to do restaging scans and labs  - will get appointment with plastics this week.     Plan is mastectomy right, alnd , recon possible port removal. The procedure and risks were discussed with the patient.  Risks include bleeding, bruising, scar, infection, need for more surgery and lymphedema

## 2017-02-23 NOTE — BRIEF OP NOTE
BRIEF OPERATIVE NOTE    Date of Procedure: 2/23/2017   Preoperative Diagnosis: BREAST CA  Postoperative Diagnosis: BREAST CA    Procedure(s):  RIGHT BREAST RECONSTRUCTION  With PREPECTORAL TISSUE EXPANDER and ALLODERM   Surgeon(s) and Role:   * Morelia Guido MD - Primary            Surgical Staff:  Circ-1: Tree Mckenna RN  Circ-2: Tawanda Garcia  Circ-Relief: Nida Meadows RN; Yenni Lezama RN  Scrub Tech-1: Verónica Reynoso  Scrub Tech-Relief: RT Loan  Surg Asst-1: Olinda Jordan  Event Time In   Incision Start 1240   Incision Close 1521     Anesthesia: General   Estimated Blood Loss: 20 mL  Specimens:   ID Type Source Tests Collected by Time Destination   1 : RIGHT BREAST Preservative Breast  Ary Viera MD 2/23/2017 1331 Pathology   2 : RIGHT AXILLARY CONTENTS Preservative Axillary  Ary Viera MD 2/23/2017 1336 Pathology      Findings: filled expander with 300 cc inj NSS   Complications: none  Implants:   Implant Name Type Inv.  Item Serial No.  Lot No. LRB No. Used Action   Alloderm Select Tissue Matrix RTU Idea Device     [id number] Right 1 Implanted   EXPNDR BRST TISS HP 375ML --  - [id number]   EXPNDR BRST TISS HP 375ML --  [id number] [place]   Right 1 Implanted

## 2017-02-24 PROCEDURE — 74011250636 HC RX REV CODE- 250/636

## 2017-02-24 PROCEDURE — 65270000029 HC RM PRIVATE

## 2017-02-24 PROCEDURE — 74011250637 HC RX REV CODE- 250/637: Performed by: PLASTIC SURGERY

## 2017-02-24 PROCEDURE — 74011250636 HC RX REV CODE- 250/636: Performed by: PLASTIC SURGERY

## 2017-02-24 RX ORDER — DIPHENHYDRAMINE HYDROCHLORIDE 50 MG/ML
INJECTION, SOLUTION INTRAMUSCULAR; INTRAVENOUS
Status: COMPLETED
Start: 2017-02-24 | End: 2017-02-24

## 2017-02-24 RX ADMIN — DOCUSATE SODIUM 100 MG: 100 CAPSULE, LIQUID FILLED ORAL at 18:54

## 2017-02-24 RX ADMIN — HYDROMORPHONE HYDROCHLORIDE 2 MG: 2 TABLET ORAL at 14:06

## 2017-02-24 RX ADMIN — DOCUSATE SODIUM 100 MG: 100 CAPSULE, LIQUID FILLED ORAL at 09:40

## 2017-02-24 RX ADMIN — Medication 2 MG: at 21:04

## 2017-02-24 RX ADMIN — SODIUM CHLORIDE, SODIUM LACTATE, POTASSIUM CHLORIDE, AND CALCIUM CHLORIDE 125 ML/HR: 600; 310; 30; 20 INJECTION, SOLUTION INTRAVENOUS at 01:46

## 2017-02-24 RX ADMIN — HYDROMORPHONE HYDROCHLORIDE 2 MG: 2 TABLET ORAL at 09:42

## 2017-02-24 RX ADMIN — CEFAZOLIN 2 G: 10 INJECTION, POWDER, FOR SOLUTION INTRAVENOUS; PARENTERAL at 21:09

## 2017-02-24 RX ADMIN — CEFAZOLIN 2 G: 10 INJECTION, POWDER, FOR SOLUTION INTRAVENOUS; PARENTERAL at 13:56

## 2017-02-24 RX ADMIN — DIAZEPAM 5 MG: 5 TABLET ORAL at 21:04

## 2017-02-24 RX ADMIN — HYDROMORPHONE HYDROCHLORIDE 2 MG: 2 TABLET ORAL at 14:14

## 2017-02-24 RX ADMIN — CEFAZOLIN 2 G: 10 INJECTION, POWDER, FOR SOLUTION INTRAVENOUS; PARENTERAL at 04:02

## 2017-02-24 RX ADMIN — Medication 2 MG: at 15:35

## 2017-02-24 RX ADMIN — HEPARIN SODIUM 5000 UNITS: 5000 INJECTION, SOLUTION INTRAVENOUS; SUBCUTANEOUS at 09:41

## 2017-02-24 RX ADMIN — DIPHENHYDRAMINE HYDROCHLORIDE 12.5 MG: 50 INJECTION, SOLUTION INTRAMUSCULAR; INTRAVENOUS at 01:40

## 2017-02-24 RX ADMIN — HEPARIN SODIUM 5000 UNITS: 5000 INJECTION, SOLUTION INTRAVENOUS; SUBCUTANEOUS at 21:04

## 2017-02-24 NOTE — DISCHARGE INSTRUCTIONS
Samantha Kirkland M.D., F.A.C.S. D.[facility]  [phone]    Tissue Expander Breast Reconstruction Post-operative Instructions      1. Surgical Bra:  You will be in a surgical bra following the procedure. This should be worn at all times except when showering for the first two weeks. The bra should be washed when it gets soiled. If the surgibra is irritating, you may place gauze pads between your skin and the bra for added comfort. After you are discharged home, you may wear a loose-fitting, sports-type bra that has NO UNDERWIRE. This should be worn day and night, except when showering, for the first month. 2. Surgical Drains:  Please refer to the GARRET Drain Instructions sheet for details. 3. Activity:  Take it easy for the first several days. No cleaning, housework, or strenuous activity. Do not lift anything over 10 pounds, including children, and do not lift your hands over your head. You may resume non-vigorous activities at two weeks, then gently increase your activity as tolerated. No running, aerobics, or other strenuous activities until four weeks. You should avoid bench pressing or pec deck exercises in the future. 4. Bathing: You may shower two days after the surgery. NO tub baths, hot tubs, swimming, or any submersion in water for one week after removal of all GARRET drains. Skin glue will be covering the incisions, just let the shower water run over them, then pat everything dry. Skin glue usually falls off on its own in about 1-2 weeks. You may remove it after two weeks if it is still present. 5. Medication:  You will receive prescriptions for an antibiotic, pain medication, and a muscle relaxant (valium). Take the antibiotic until it is finished, the valium as directed, and the pain medication as needed according to the directions. Avoid aspirin and non-steroidal anti-inflammatories (e.g. ibuprofen, advil, etc.) for two weeks after surgery.     6. Expansion: The tissue expander will typically have some saline fluid placed in it at the time of surgery. The expansion process, to stretch the skin and create the new breast, is performed by filling the tissue expander during office visits. This usually begins approximately three weeks after your surgery. 7. Things to watch for: If you experience excessive or sudden swelling, spreading or increasing redness, increasing pain, foul-smelling drainage, separation of any incisions, fever, shaking chills, or any other concerns, please call the office immediately ([phone]). 8. Follow-up appointment: please call the office at [phone] during regular business hours to schedule an appointment in approximately 2 weeks. Tay Woods M.D., F.A.C.S. D.[facility]  [phone]    GARRET Drain Instructions    Purpose: You have had surgery during which a Angel-Lopez drain, or GARRET drain, has been placed by your surgeon. A GARRET drain is a rubber tube which goes under the skin and drains excess fluid during the healing process so that this fluid does not accumulate. There is a one-way valve which allows the fluid to collect in the bulb on the end of the drain. The drain is usually held in place by a single stitch. The color of the drainage can range from reddish to pink to straw-colored. Care of the drain:  Caring for the GARRET drain is fairly easy. First, protect the drain so that it does not get pulled inadvertently. You may fasten them to your clothing with a safety pin through the floppy tag on the bulb. DO NOT place a pin through either the drain tubing or the bulb itself. The drain works by maintaining a constant low pressure of suction when the bulb is in the collapsed (squeezed in) position. If the bulb will not maintain this collapsed position when it is recapped, please call the office, as the drain may not be working properly.   If you had an On-Q® pain pump placed during your surgery, you may wish to keep the black tim pack once the On-Q® has been removed to carry the GARRET drain bulbs. Measuring the drainage:  Grasp the bulb in one hand and remove the cap with the other hand. This will cause the bulb to relax into a round shape. Holding the open end over a specimen cup, squirt the fluid into the cup by squeezing the bulb. Once the bulb is empty, squeeze it in (collapse it) with one hand, and replace the cap with your other hand. Then holding the measuring cup level, note how much fluid there is (in milliliters, mL), and record this number on the chart below. Discard the fluid in the toilet and rinse out the specimen cup. Note: your specimen cup may be in cubic centimeters (cc). 1 cc = 1 mL. Removal:  The GARRET drain will be removed in the office when the daily drainage is at a low enough level. You may be asked to call the office with your measuring totals to determine if the drain(s) is (are) ready to be removed. Removal involves minimal discomfort without the need for anesthesia. The drain site in the skin heals on its own once the drain is removed without any further stitches. Showering: Your surgeon may give you permission to shower while you have one or more GARRET drains in. Just let the water run over the drain sites and then pat them dry when you are done. Some patients like to place a long string necklace around their necks during showers to which they can safety pin the tag on the bulb to prevent it from dangling. DO NOT take a tub bath, go swimming (pool or lake/ocean), or otherwise submerge your body in water before all GARRET drains have been removed and you are permitted by your surgeon.                 Things to look out for:  Please call the office immediately ([phone]) if you notice:   Sudden bright or dark red bleeding into the bulb or around the drain site in the skin   Dislodgment of the GARRET drain or if the drain falls out   Spreading redness around the drain site in the skin   Cloudy or foul-smelling drainage in the bulb or around the drain site   Fever, shaking chills, excessive swelling, pain or discomfort   Any other concerns or questions        Date           #1 (AM)             #1 (PM)             Daily Total #1  (AM+PM)             #2 (AM)           #2 (PM)           Daily  Total  #2  (AM+PM)

## 2017-02-24 NOTE — PROGRESS NOTES
POD#1  Doing well  AVSS  GARRET serosang  Right breast: inc CDI; no hematoma; skin flaps warm and viable  Stable  Plan: increase activity  IS/DVT proph  prob home tomorrow  Cont bra  GARRET teaching

## 2017-02-24 NOTE — PROGRESS NOTES
Cefazolin 2gm IV q8h  Has received 3 doses Cefazolin post-op so far today. If Cefazolin is for surgical prophylaxis, please consider discontinuing. If Cefazolin is to treat infection, please indicate such.     thanks

## 2017-02-24 NOTE — PROGRESS NOTES
End of Shift Nursing Note    Bedside shift change report given to Ehsan Boudreaux RN (oncoming nurse) by Josephine Salgado RN (offgoing nurse). Report included the following information SBAR, Intake/Output, MAR and Recent Results. Zone Phone:   [phone]    Significant changes during shift:    None. Call x2 to OR for surgrical bra to be tube up. Awaiting. Non-emergent issues for physician to address:   None     Number times ambulated in hallway past shift: 0      Number of times OOB to chair past shift: 1    POD #: 1     Vital Signs:    Temp: 98.2 °F (36.8 °C)     Pulse (Heart Rate): 93     BP: 127/72     Resp Rate: 16     O2 Sat (%): 96 %    Lines & Drains:     Urinary Catheter? No          NG tube [] in [] removed [x] not applicable   Drains [x] in [] removed [] not applicable     Skin Integrity:      Wounds: surgical incision line x2 on right breast  Dressings Present: no    Wound Concerns: no      GI:    Current diet:  DIET REGULAR    Nausea: NO  Vomiting: NO  Bowel Sounds: YES  Flatus: NO  Last Bowel Movement: yesterday      Respiratory:  Supplemental O2: No        Patient Safety:    Falls Score: 2  Mobility Score: 3      Opportunity for questions and clarification was given to oncoming nurse. Patient bed is in low position, side rails are up x 2, door & observation blinds open as needed, call bell within reach and patient not in distress.     Jesse Dominguez RN

## 2017-02-24 NOTE — PROGRESS NOTES
Pod 1 right mrm, te port removal    Doing well. Voiding. Pain is an issue. Visit Vitals    /72 (BP 1 Location: Left arm, BP Patient Position: At rest)    Pulse 93    Temp 98.2 °F (36.8 °C)    Resp 16    Ht 5' 6\" (1.676 m)    Wt 205 lb 3.2 oz (93.1 kg)    LMP 06/24/2016    SpO2 96%    BMI 33.12 kg/m2       Date 02/23/17 0700 - 02/24/17 0659 02/24/17 0700 - 02/25/17 0659   Shift 4224-6313 2022-6409 24 Hour Total 3007-3803 9318-1690 24 Hour Total   I  N  T  A  K  E   P.O.  480 480         P. O.  480 480       I.V.  (mL/kg/hr) 1000  (0.9) 1452.1  (1.3) 2452.1  (1.1)         Volume (lactated ringers infusion)  1252.1 1252.1         Volume (lactated ringers infusion) 1000  1000         Volume (ceFAZolin (ANCEF) 2g in 100 ml 0.9% NS IVPB)  200 200       Shift Total  (mL/kg) 1000  (10.7) 1932.1  (20.8) 2932.1  (31.5)      O  U  T  P  U  T   Urine  (mL/kg/hr)  250  (0.2) 250  (0.1)         Urine Voided  250 250       Drains 90 180 270         Output (ml) (Drain DENVER 55XH SILICONE DRAIN, HUBLESS 02/23/17 Right Other (comment)) 90 180 270       Blood 25  25         Estimated Blood Loss 25  25       Shift Total  (mL/kg) 115  (1.2) 430  (4.6) 545  (5.9)       1502.1 2387. 1      Weight (kg) 93.1 93.1 93.1 93.1 93.1 93.1     Exam: right expander intact. Drain intact. No hematoma    A/p  1. Advance diet  2. Pain control.    3. DC per plastic surgery when appropriate

## 2017-02-25 VITALS
HEIGHT: 66 IN | TEMPERATURE: 99.2 F | BODY MASS INDEX: 32.98 KG/M2 | HEART RATE: 93 BPM | WEIGHT: 205.2 LBS | OXYGEN SATURATION: 94 % | RESPIRATION RATE: 18 BRPM | SYSTOLIC BLOOD PRESSURE: 141 MMHG | DIASTOLIC BLOOD PRESSURE: 96 MMHG

## 2017-02-25 PROCEDURE — 74011250637 HC RX REV CODE- 250/637: Performed by: PLASTIC SURGERY

## 2017-02-25 PROCEDURE — 74011250636 HC RX REV CODE- 250/636: Performed by: PLASTIC SURGERY

## 2017-02-25 RX ADMIN — DOCUSATE SODIUM 100 MG: 100 CAPSULE, LIQUID FILLED ORAL at 09:48

## 2017-02-25 RX ADMIN — CEFAZOLIN 2 G: 10 INJECTION, POWDER, FOR SOLUTION INTRAVENOUS; PARENTERAL at 03:24

## 2017-02-25 RX ADMIN — HYDROMORPHONE HYDROCHLORIDE 4 MG: 2 TABLET ORAL at 09:48

## 2017-02-25 NOTE — PROGRESS NOTES
End of Shift Nursing Note    Bedside shift change report given to [employer] (oncoming nurse). Report included the following information SBAR, Kardex, MAR and Recent Results. Zone Phone:   [phone]    Significant changes during shift:    none   Non-emergent issues for physician to address:   none     Number times ambulated in hallway past shift:       Number of times OOB to chair past shift: up ad aurelia    POD #: 2     Vital Signs:    Temp: 98.1 °F (36.7 °C)     Pulse (Heart Rate): (!) 102     BP: 139/82     Resp Rate: 18     O2 Sat (%): 98 %    Lines & Drains:     Urinary Catheter? No   Placement Date:    Medical Necessity:   Central Line? No   Placement Date:    Medical Necessity:   PICC Line? No   Placement Date:    Medical Necessity:     NG tube [] in [] removed [x] not applicable   Drains [x] in [] removed [] not applicable     Skin Integrity:      Wounds: yes   Dressings Present: yes    Wound Concerns: no      GI:    Current diet:  DIET REGULAR    Nausea: NO  Vomiting: NO  Bowel Sounds: YES  Flatus: YES  Last Bowel Movement:    Appearance:     Respiratory:  Supplemental O2: No     Device:        Incentive Spirometer: NO  Volume:   Coughing and Deep Breathing: YES  Oral Care: YES  Understanding (patient/family education): YES   Getting out of bed: NO  Head of bed elevation: YES    Patient Safety:    Falls Score: 2  Mobility Score: 4  Bed Alarm On? No  Sitter? No      Opportunity for questions and clarification was given to oncoming nurse. Patient bed is in low position, side rails are up x 2, door & observation blinds open as needed, call bell within reach and patient not in distress.     Romeo Saez RN

## 2017-02-25 NOTE — PROGRESS NOTES
Offered patient assistance in washing up for the am.   and patient refused at this time.  stated he would let us know when they were ready to wash up.

## 2017-02-25 NOTE — PROGRESS NOTES
POD#2  Doing well  AVSS  GARERT serous  Right breast: inc CDI; skin flaps viable  No hematoma  Stable  D/C home today

## 2017-02-25 NOTE — PROGRESS NOTES
End of Shift Nursing Note    Bedside shift change report given to Chidi (oncoming nurse) by Kia Agosto (offgoing nurse). Report included the following information SBAR, Kardex and Accordion. Zone Phone:   [phone]    Significant changes during shift:    none   Non-emergent issues for physician to address:   none     Number times ambulated in hallway past shift: 0      Number of times OOB to chair past shift: 2    POD #: 1     Vital Signs:    Temp: 98.9 °F (37.2 °C)     Pulse (Heart Rate): 99     BP: 138/83     Resp Rate: 18     O2 Sat (%): 100 %    Lines & Drains:     Urinary Catheter? No   Placement Date:    Medical Necessity:   Central Line? No   Placement Date:    Medical Necessity:   PICC Line? No   Placement Date:    Medical Necessity:     NG tube [] in [] removed [x] not applicable   Drains [] in [] removed [x] not applicable     Skin Integrity:      Wounds: yes   Dressings Present: yes    Wound Concerns: no      GI:    Current diet:  DIET REGULAR    Nausea: NO  Vomiting: NO  Bowel Sounds: YES  Flatus: YES  Last Bowel Movement: yesterday   Appearance:     Respiratory:  Supplemental O2: No      Device:    via  Liters/min     Incentive Spirometer: YES  Volume:   Coughing and Deep Breathing: YES  Oral Care: YES  Understanding (patient/family education): YES   Getting out of bed: YES  Head of bed elevation: YES    Patient Safety:    Falls Score: 1  Mobility Score: 0  Bed Alarm On? No  Sitter? No      Opportunity for questions and clarification was given to oncoming nurse. Patient bed is in Low position, side rails are up x 2, door & observation blinds open as needed, call bell within reach and patient not in distress.     Ahmet Miramontes RN

## 2017-02-25 NOTE — PROGRESS NOTES
Patient given surgical bra. Offered assistance in putting on bra  and patient stated no assistance needed.

## 2017-03-07 ENCOUNTER — HOSPITAL ENCOUNTER (OUTPATIENT)
Dept: RADIATION THERAPY | Age: 52
Discharge: HOME | End: 2017-03-07

## 2017-03-09 ENCOUNTER — OFFICE VISIT (OUTPATIENT)
Dept: ONCOLOGY | Age: 52
End: 2017-03-09

## 2017-03-09 VITALS
HEIGHT: 66 IN | SYSTOLIC BLOOD PRESSURE: 120 MMHG | HEART RATE: 86 BPM | WEIGHT: 204.4 LBS | OXYGEN SATURATION: 97 % | BODY MASS INDEX: 32.85 KG/M2 | RESPIRATION RATE: 16 BRPM | TEMPERATURE: 98.3 F | DIASTOLIC BLOOD PRESSURE: 90 MMHG

## 2017-03-09 DIAGNOSIS — C50.911 BREAST CANCER METASTASIZED TO AXILLARY LYMPH NODE, RIGHT (HCC): ICD-10-CM

## 2017-03-09 DIAGNOSIS — Z98.890 S/P BREAST RECONSTRUCTION, RIGHT: ICD-10-CM

## 2017-03-09 DIAGNOSIS — C77.3 BREAST CANCER METASTASIZED TO AXILLARY LYMPH NODE, RIGHT (HCC): ICD-10-CM

## 2017-03-09 DIAGNOSIS — C50.411 BREAST CANCER OF UPPER-OUTER QUADRANT OF RIGHT FEMALE BREAST (HCC): Primary | ICD-10-CM

## 2017-03-09 DIAGNOSIS — Z90.11 S/P MASTECTOMY, RIGHT: ICD-10-CM

## 2017-03-09 PROBLEM — Z90.10 S/P MASTECTOMY: Status: ACTIVE | Noted: 2017-03-09

## 2017-03-09 RX ORDER — CEFADROXIL 500 MG/1
CAPSULE ORAL DAILY
COMMUNITY
Start: 2017-02-25 | End: 2017-03-29 | Stop reason: CLARIF

## 2017-03-09 RX ORDER — DIAZEPAM 5 MG/1
TABLET ORAL
COMMUNITY
Start: 2017-02-25 | End: 2017-03-29 | Stop reason: CLARIF

## 2017-03-09 RX ORDER — HYDROMORPHONE HYDROCHLORIDE 2 MG/1
TABLET ORAL AS NEEDED
COMMUNITY
Start: 2017-02-25 | End: 2017-06-28

## 2017-03-09 NOTE — PROGRESS NOTES
Chief Complaint   Patient presents with    Breast Cancer     1. Have you been to the ER, urgent care clinic since your last visit? Hospitalized since your last visit? Yes 2/23/17 [facility]    2. Have you seen or consulted any other health care providers outside of the [address] [place] [address] since your last visit? Include any pap smears or colon screening.  Dr. Sanaz Anders 2/23/17

## 2017-03-09 NOTE — PROGRESS NOTES
HEME/ONC PROGRESS NOTE       Santo Porras is a 46 y.o. 1965 female and presents with Breast Cancer    CC  Triple neg right breast cancer 7/16    HPI  Pt seen today in office for f/u triple negative breast cancer s/p right mastectomy / reconstruction. Did well with surgery 1.4 cm cancer with 15 negative LNs. Seeing plastics for reconstruction. Seeing rad/onc for radiation. CTs stable 1/17  Labs ok 2/17. DX   Encounter Diagnoses   Name Primary?  Breast cancer of upper-outer quadrant of right female breast ([facility] [address] Yes    Breast cancer metastasized to axillary lymph node, right ([facility] [address]     S/P mastectomy, right     S/P breast reconstruction, right         STAGE: T2N1 clinically triple neg    TREATMENT COURSE:  Biopsy only  Neoadjuvant chemo carbo/ taxol, DD AC. Right mastectomy/ reconstruction. Then radiation.     Past Medical History:   Diagnosis Date    Atopic dermatitis     Breast cancer, right ([facility] [address] 07/2016    Pleurisy 2006    Thyroid nodule     benign     Past Surgical History:   Procedure Laterality Date    HX BREAST BIOPSY Right 2016    HX BREAST RECONSTRUCTION Right 2/23/2017    RIGHT BREAST RECONSTRUCTION  /  TISSUE EXPANDER / ALLODERM  performed by Germain Meza MD at [facility]    HX GYN      CK    HX MASTECTOMY Right 2/23/2017    RIGHT BREAST TOTAL MASTECTOMY, AXILLARY LYMPH NODE DISSECTION, PORTACATH REMOVAL performed by Sammy Weir MD at [facility]    HX TONSILLECTOMY  1974    HX VASCULAR ACCESS  2016     Social History     Social History    Marital status:      Spouse name: N/A    Number of children: N/A    Years of education: N/A     Social History Main Topics    Smoking status: Never Smoker    Smokeless tobacco: Never Used    Alcohol use No    Drug use: No    Sexual activity: Not Asked     Other Topics Concern    None     Social History Narrative     Family History   Problem Relation Age of Onset    Colon Cancer Mother 39  Colon Cancer Paternal Uncle 79    Colon Cancer Paternal Uncle 79     No Known Allergies    Review of Systems    A comprehensive review of systems was negative except for: per HPI     Objective:  Visit Vitals    /90 (BP 1 Location: Left arm, BP Patient Position: Sitting)    Pulse 86    Temp 98.3 °F (36.8 °C) (Oral)    Resp 16    Ht 5' 6\" (1.676 m)    Wt 204 lb 6.4 oz (92.7 kg)    LMP 06/24/2016    SpO2 97%    BMI 32.99 kg/m2         Physical Exam:   General appearance - alert, well appearing, and in no distress, oriented to person, place, and time and overweight  Mental status - alert, oriented to person, place, and time, normal mood, behavior, speech, dress, motor activity, and thought processes  EYE-conj clear  Mouth - mucous membranes moist, pharynx normal. No lesions or thrush. Neck - supple, no significant adenopathy   Chest - clear to auscultation bilaterally  Heart - regular rate and rhythm  Abdomen - round, soft, nontender  Ext-no pedal edema noted  Skin-Warm and dry. Neuro -alert, oriented, normal speech, no focal findings or movement disorder noted  Breasts - right mastectomy/ reconstruction healed well/ left breast without palpable masses. Diagnostic Imaging   reviewed  Results for orders placed during the hospital encounter of 01/21/17   CT ABD PELV W CONT    Narrative INDICATION: Malignant neoplasm of upper outer quadrant of right female breast,  completed neoadjuvant chemotherapy, restaging    COMPARISON: 7/26/2016    TECHNIQUE:  Following the uneventful intravenous administration of 100 cc  Isovue-370, 5 mm axial images were obtained through the chest, abdomen, and  pelvis. CT dose reduction was achieved through use of a standardized protocol  tailored for this examination and automatic exposure control for dose  modulation. FINDINGS:    THYROID: Multinodular appearance of the left lobe is stable. MEDIASTINUM: No mass or lymphadenopathy.   MAGDIEL: No mass or lymphadenopathy. THORACIC AORTA: No dissection or aneurysm. MAIN PULMONARY ARTERY: Normal in caliber. TRACHEA/BRONCHI: Patent. ESOPHAGUS: No wall thickening or dilatation. HEART: Normal in size. PLEURA: No effusion or pneumothorax. LUNGS: No nodule, mass, or airspace disease. The enhancing nodules in the right breast have near completely resolved. The  smaller nodule previously measuring 13 mm now measures 6 mm. The larger nodule  is not visualized. Right axillary lymphadenopathy has improved significantly. The previously described 4.7 cm right axillary lymph node now measures 2.1 cm      LIVER: Tiny hepatic hypodensities are stable, the largest of which measures 6  mm. GALLBLADDER: Unremarkable. SPLEEN: No mass. PANCREAS: No mass or ductal dilatation. ADRENALS: Unremarkable. KIDNEYS: No mass, calculus, or hydronephrosis. STOMACH: Unremarkable. SMALL BOWEL: No dilatation or wall thickening. COLON: No dilatation or wall thickening. APPENDIX: Unremarkable. PERITONEUM: No ascites or pneumoperitoneum. RETROPERITONEUM: No lymphadenopathy or aortic aneurysm. REPRODUCTIVE ORGANS: The uterus is mildly myomatous in appearance. URINARY BLADDER: No mass or calculus. BONES: Degenerative changes are seen in the lumbar spine. There is a stable  small sclerotic lesion in the right iliac crest.  ADDITIONAL COMMENTS: N/A      Impression IMPRESSION:  Improvement in the enhancing nodules in the right breast and the right axillary  lymphadenopathy. Stable small sclerotic lesion right iliac crest. No new  evidence of metastatic disease.          Lab Results  reviewed  Lab Results   Component Value Date/Time    WBC 2.2 02/10/2017 08:43 AM    HGB 11.2 02/10/2017 08:43 AM    HCT 35.4 02/10/2017 08:43 AM    PLATELET 980 25/50/1184 08:43 AM    MCV 88.3 02/10/2017 08:43 AM       Lab Results   Component Value Date/Time    Sodium 138 02/10/2017 08:43 AM    Potassium 3.8 02/10/2017 08:43 AM    Chloride 104 02/10/2017 08:43 AM    CO2 27 02/10/2017 08:43 AM    Anion gap 7 02/10/2017 08:43 AM    Glucose 95 02/10/2017 08:43 AM    BUN 9 02/10/2017 08:43 AM    Creatinine 0.68 02/10/2017 08:43 AM    BUN/Creatinine ratio 13 02/10/2017 08:43 AM    GFR est AA >60 02/10/2017 08:43 AM    GFR est non-AA >60 02/10/2017 08:43 AM    Calcium 9.1 02/10/2017 08:43 AM    AST (SGOT) 10 02/10/2017 08:43 AM    Alk. phosphatase 83 02/10/2017 08:43 AM    Protein, total 6.6 02/10/2017 08:43 AM    Albumin 3.5 02/10/2017 08:43 AM    Globulin 3.1 02/10/2017 08:43 AM    A-G Ratio 1.1 02/10/2017 08:43 AM    ALT (SGPT) 19 02/10/2017 08:43 AM       Assessment/Plan:    1. Clinical T2N1 right triple negative breast cancer s/p biopsy only    She completed neoadjuvant chemotherapy with Carbo/Taxol followed by DD AC on 12/16/16. Had right mastectomy/ reconstruction and healing well. Seeing plastics for fills. Seeing rad/onc and will do radiation and start soon. Restaging CT's chest/abdomen/pelvis good 1/17. Labs stable 2/17. 2.  Chest wall discomfort. Minimal.    3.  Support good.  here today. F/u here in 3 months. Call if questions. ICD-10-CM ICD-9-CM    1. Breast cancer of upper-outer quadrant of right female breast ([facility] [address] C50.411 174.4    2. Breast cancer metastasized to axillary lymph node, right (HCC) C50.911 174.9     C77.3 196.3    3. S/P mastectomy, right Z90.11 V45.71    4. S/P breast reconstruction, right Z98.890 V43.82        Current Outpatient Prescriptions   Medication Sig    cefadroxil (DURICEF) 500 mg capsule daily.  HYDROmorphone (DILAUDID) 2 mg tablet as needed.  ferrous sulfate 324 mg (65 mg iron) tablet Take  by mouth Daily (before breakfast).  cholecalciferol (VITAMIN D3) 1,000 unit cap Take  by mouth daily.  OTHER,NON-FORMULARY, Take 1 Tab by mouth three (3) times daily.  Probiotic 11 and protease    diazePAM (VALIUM) 5 mg tablet     ondansetron (ZOFRAN ODT) 8 mg disintegrating tablet Take 1 Tab by mouth every eight (8) hours as needed for Nausea. Indications: CANCER CHEMOTHERAPY-INDUCED NAUSEA AND VOMITING    prochlorperazine (COMPAZINE) 10 mg tablet Take 1 Tab by mouth every six (6) hours as needed. Indications: CANCER CHEMOTHERAPY-INDUCED NAUSEA AND VOMITING     No current facility-administered medications for this visit. continue present plan, call if any problems  There are no Patient Instructions on file for this visit. Follow-up Disposition:  Return in about 3 months (around 6/9/2017).     Cari Uribe DO

## 2017-03-09 NOTE — MR AVS SNAPSHOT
Visit Information Date & Time Provider Department Dept. Phone Encounter #  
 3/9/2017  9:00 AM Ale Berumen, [address] [facility] [phone] Follow-up Instructions Return in about 3 months (around 6/9/2017). Routing History Follow-up and Disposition History Your Appointments 3/10/2017  8:45 AM  
POST OP with MD Cj Gutiérrez 22 [facility] Appt Note: RIGHT Breast Post op per Dr. Douglass Salts [phone]  
[address] [place] [address] [place] [address] Upcoming Health Maintenance Date Due Hepatitis C Screening 1965 Pneumococcal 19-64 Highest Risk (1 of 3 - PCV13) 3/26/1984 DTaP/Tdap/Td series (1 - Tdap) 3/26/1986 PAP AKA CERVICAL CYTOLOGY 3/26/1986 FOBT Q 1 YEAR AGE 50-75 3/26/2015 INFLUENZA AGE 9 TO ADULT 8/1/2016 BREAST CANCER SCRN MAMMOGRAM 7/12/2018 Allergies as of 3/9/2017  Review Complete On: 3/9/2017 By: Ale Berumen, DO No Known Allergies Current Immunizations  Reviewed on 2/10/2017 No immunizations on file. Not reviewed this visit You Were Diagnosed With   
  
 Codes Comments Breast cancer of upper-outer quadrant of right female breast ([facility] [address]    -  Primary ICD-10-CM: O01.115 ICD-9-CM: 174.4 Breast cancer metastasized to axillary lymph node, right ([facility] [address]     ICD-10-CM: C50.911, C77.3 ICD-9-CM: 174.9, 196.3 S/P mastectomy, right     ICD-10-CM: Z90.11 ICD-9-CM: V45.71 S/P breast reconstruction, right     ICD-10-CM: J31.846 ICD-9-CM: V43.82 Vitals BP Pulse Temp Resp Height(growth percentile) Weight(growth percentile) 120/90 (BP 1 Location: Left arm, BP Patient Position: Sitting) 86 98.3 °F (36.8 °C) (Oral) 16 5' 6\" (1.676 m) 204 lb 6.4 oz (92.7 kg) LMP SpO2 BMI OB Status Smoking Status 06/24/2016 97% 32.99 kg/m2 Postmenopausal Never Smoker BMI and BSA Data Body Mass Index Body Surface Area  
 32.99 kg/m 2 2.08 m 2 Preferred Pharmacy Pharmacy Name Phone [facility] [address] [place] [address]. [phone] Your Updated Medication List  
  
   
This list is accurate as of: 3/9/17  9:52 AM.  Always use your most recent med list.  
  
  
  
  
 cefadroxil 500 mg capsule Commonly known as:  DURICEF  
daily. diazePAM 5 mg tablet Commonly known as:  VALIUM  
  
 ferrous sulfate 324 mg (65 mg iron) tablet Take  by mouth Daily (before breakfast). HYDROmorphone 2 mg tablet Commonly known as:  DILAUDID  
as needed. ondansetron 8 mg disintegrating tablet Commonly known as:  ZOFRAN ODT Take 1 Tab by mouth every eight (8) hours as needed for Nausea. Indications: CANCER CHEMOTHERAPY-INDUCED NAUSEA AND VOMITING  
  
 OTHER(NON-FORMULARY) Take 1 Tab by mouth three (3) times daily. Probiotic 11 and protease  
  
 prochlorperazine 10 mg tablet Commonly known as:  COMPAZINE Take 1 Tab by mouth every six (6) hours as needed. Indications: CANCER CHEMOTHERAPY-INDUCED NAUSEA AND VOMITING  
  
 VITAMIN D3 1,000 unit Cap Generic drug:  cholecalciferol Take  by mouth daily. Follow-up Instructions Return in about 3 months (around 6/9/2017). Introducing [facility]! Dear Gibran Cheema: 
Thank you for requesting a [employer] account. Our records indicate that you already have an active [employer] account. You can access your account anytime at [email or web address] Did you know that you can access your hospital and ER discharge instructions at any time in [employer]? You can also review all of your test results from your hospital stay or ER visit. Additional Information If you have questions, please visit the Frequently Asked Questions section of the [employer] website at [email or web address] Remember, [employer] is NOT to be used for urgent needs. For medical emergencies, dial 911. Now available from your iPhone and Android! Please provide this summary of care documentation to your next provider. Your primary care clinician is listed as Benny Reid. If you have any questions after today's visit, please call [phone].

## 2017-03-10 ENCOUNTER — OFFICE VISIT (OUTPATIENT)
Dept: SURGERY | Age: 52
End: 2017-03-10

## 2017-03-10 ENCOUNTER — APPOINTMENT (OUTPATIENT)
Dept: INFUSION THERAPY | Age: 52
End: 2017-03-10

## 2017-03-10 VITALS
DIASTOLIC BLOOD PRESSURE: 87 MMHG | HEIGHT: 66 IN | WEIGHT: 204 LBS | SYSTOLIC BLOOD PRESSURE: 133 MMHG | BODY MASS INDEX: 32.78 KG/M2 | HEART RATE: 87 BPM

## 2017-03-10 DIAGNOSIS — C50.911 BREAST CANCER METASTASIZED TO AXILLARY LYMPH NODE, RIGHT (HCC): ICD-10-CM

## 2017-03-10 DIAGNOSIS — C77.3 BREAST CANCER METASTASIZED TO AXILLARY LYMPH NODE, RIGHT (HCC): ICD-10-CM

## 2017-03-10 DIAGNOSIS — C50.411 BREAST CANCER OF UPPER-OUTER QUADRANT OF RIGHT FEMALE BREAST (HCC): Primary | ICD-10-CM

## 2017-03-10 NOTE — PROGRESS NOTES
HISTORY OF PRESENT ILLNESS  Griselda Matthews is a 46 y.o. female. HPI ESTABLISHED patient here for post op visit. She is s/p RIGHT mastectomy with prepectoral te recon (Dr. Balaji Myles) and RIGHT ALND and port removal on 2/23/17. No drains in anymore. Sees Dr. Balaji Myles again this afternoon. Not having any pain at this time. Using Dilaudid as needed. surg path:  T1 y (1.2 cm) with chemo reaction  15 nodes, 1 node with fibrosis and clip  Margins clear     Review of Systems   All other systems reviewed and are negative. Physical Exam   Pulmonary/Chest:       Palpable seroma incision c/d/i. Nursing note and vitals reviewed. ASSESSMENT and PLAN    ICD-10-CM ICD-9-CM    1. Breast cancer of upper-outer quadrant of right female breast ([facility] [address] C50.411 174.4    2. Breast cancer metastasized to axillary lymph node, right (HCC) C50.911 174.9     C77.3 196.3      -good response to chemo  - has appt with Dr. Balaji Myles today  - She has met with Dr. Adela Yarbrough does not want radiation  - start arm exercises  - f/u in 3 months.

## 2017-03-10 NOTE — PATIENT INSTRUCTIONS
Breast Reconstruction With Expander or Implant: What to Expect at [address] [facility]  Breast reconstruction is surgery to rebuild the shape of your breast after you have had part or all of your breast removed because of cancer. It may also be done for women who have problems with breast development. Right after the surgery you will probably feel weak, and you may feel pain for 2 to 3 weeks. You may have a pulling or stretching feeling in your breast area. You can expect to feel better and stronger each day, although you may need pain medicine for a week or two. You may get tired easily or have less energy than usual. This may last for several weeks after surgery. Stitches usually are removed in 5 to 10 days. Your new breast may feel firmer and look rounder or flatter than your other breast. The new breast may not have the same shape as your breast did before it was removed. Breast reconstruction cannot restore normal feeling to your breast, but in time, some feeling may return. It may take several months for your breast to heal.  You may have surgery to create a nipple and the brown area around it a few months after your breast is reconstructed. Breast reconstruction can improve your appearance and renew your self-confidence. Keep in mind that it may take time to get used to your new breast. You may want to talk with a counselor if you need more support as you get used to your new appearance. This care sheet gives you a general idea about how long it will take for you to recover. But each person recovers at a different pace. Follow the steps below to get better as quickly as possible. How can you care for yourself at home? Activity  · Rest when you feel tired. Getting enough sleep will help you recover. · For about 6 weeks after surgery, avoid lifting anything that would make you strain.  This may include a child, heavy grocery bags, milk containers, a heavy briefcase or backpack, cat litter or dog food bags, a vacuum , or anything that weighs more than 10 to 15 pounds. Do not lift anything over your head for 3 to 6 weeks. · You may have pain for a few weeks after surgery. Support the area with your hands or a firm pillow when you bend, cough, or move. · Ask your doctor when you can drive again. · Ask your doctor when it is okay for you to have sex. · You can take your first shower the day after the drain near your incision is removed. This is usually in about 1 week. Do not take a bath or soak in a hot tub for about 4 weeks. · You will probably be able to go back to work or your normal routine in 3 to 6 weeks. This depends on the type of work you do and any further treatment. Diet  · You can eat your normal diet. If your stomach is upset, try bland, low-fat foods like plain rice, broiled chicken, toast, and yogurt. · Drink plenty of fluids (unless your doctor tells you not to). · You may notice that your bowel movements are not regular right after your surgery. This is common. Try to avoid constipation and straining with bowel movements. You may want to take a fiber supplement. If you have not had a bowel movement after a couple of days, ask your doctor about taking a mild laxative. Medicines  · Your doctor will tell you if and when you can restart your medicines. He or she will also give you instructions about taking any new medicines. · If you take blood thinners, such as warfarin (Coumadin), clopidogrel (Plavix), or aspirin, be sure to talk to your doctor. He or she will tell you if and when to start taking those medicines again. Make sure that you understand exactly what your doctor wants you to do. · Take pain medicines exactly as directed. ¨ If the doctor gave you a prescription medicine for pain, take it as prescribed. ¨ If you are not taking a prescription pain medicine, ask your doctor if you can take an over-the-counter medicine.   · If you think your pain medicine is making you sick to your stomach:  ¨ Take your medicine after meals (unless your doctor has told you not to). ¨ Ask your doctor for a different pain medicine. If you were given medicine for nausea, take it as directed. · If your doctor prescribed antibiotics, take them as directed. Do not stop taking them just because you feel better. You need to take the full course of antibiotics. Incision care  · If your doctor gave you specific instructions on how to care for your incision, follow those instructions. · You may be wearing a special bra that holds your bandages in place after the surgery. Your doctor will tell you when you can stop wearing the bra. Your doctor may want you to wear the bra at night as well as during the day for several weeks. Do not wear an underwire bra for 1 month. · If you have strips of tape on the incision, leave the tape on for a week or until it falls off. · Wash the area daily with warm, soapy water, and pat it dry. Don't use hydrogen peroxide or alcohol, which can slow healing. · You may cover the area with a gauze bandage if it weeps or rubs against clothing. Change the bandage 1 to 2 times every day. Consider having someone help you with this. · Keep the area clean and dry. Exercise  · Try to walk each day. Start by walking a little more than you did the day before. Bit by bit, increase the amount you walk. Walking boosts blood flow and helps prevent pneumonia, constipation, and blood clots in your legs. · Avoid strenuous activities, such as bicycle riding, jogging, weight lifting, or aerobic exercise, until your doctor says it is okay. This includes housework, especially if you have to use the arm on the side of your surgery. · Your doctor will tell you when to begin stretching exercises and normal activities. Elevation  · Prop up the arm on the side of your surgery on a pillow when you sit or lie down. Try to keep your arm above the level of your heart.  This will help reduce swelling. Other instructions  · You may have one or more drains near your incision. Your doctor will tell you how to take care of them. Drains are usually removed in the first week after surgery. Follow-up care is a key part of your treatment and safety. Be sure to make and go to all appointments, and call your doctor if you are having problems. It's also a good idea to know your test results and keep a list of the medicines you take. When should you call for help? Call 911 anytime you think you may need emergency care. For example, call if:  · You passed out (lost consciousness). · You have sudden chest pain and shortness of breath, or you cough up blood. Call your doctor now or seek immediate medical care if:  · You have severe pain in your breast area. · You have fluid under the skin or a lot of swelling at the surgery site. · You are sick to your stomach or cannot keep fluids down. · You have pain that does not get better after you take pain medicine. · You have loose stitches, or your incision comes open. · You bleed through a large bandage over your incision. · You develop a cough. · You have signs of infection, such as:  ¨ Increased pain, swelling, warmth, or redness. ¨ Red streaks leading from the incision. ¨ Pus draining from the incision. ¨ A fever. · You have signs of a blood clot, such as:  ¨ Pain in your calf, back of the knee, thigh, or groin. ¨ Redness and swelling in your leg or groin. Watch closely for any changes in your health, and be sure to contact your doctor if:  · Your swelling or pain is getting worse. · Your breast with an implant gets hard or hurts, or the shape changes. · You do not have a bowel movement after taking a laxative or using a suppository. · You feel anxious or depressed or have trouble sleeping. · You are not getting better as expected. Where can you learn more? Go to http://myrna-conchis.info/.   Enter C689 in the search box to learn more about \"Breast Reconstruction With Expander or Implant: What to Expect at Home. \"  Current as of: July 26, 2016  Content Version: 11.1  © 2222-7864 [employer]. Care instructions adapted under license by [employer] (which disclaims liability or warranty for this information). If you have questions about a medical condition or this instruction, always ask your healthcare professional. [place] [address] any warranty or liability for your use of this information.

## 2017-03-15 DIAGNOSIS — C50.411 BREAST CANCER OF UPPER-OUTER QUADRANT OF RIGHT FEMALE BREAST (HCC): Primary | ICD-10-CM

## 2017-03-27 NOTE — PERIOP NOTES
KHURRAM Tse office, requesting pts anesthesia required EKG if available or PAT apt.  Requested callback

## 2017-03-29 ENCOUNTER — HOSPITAL ENCOUNTER (OUTPATIENT)
Dept: PREADMISSION TESTING | Age: 52
Discharge: HOME | End: 2017-03-29
Payer: COMMERCIAL

## 2017-03-29 VITALS
HEART RATE: 87 BPM | HEIGHT: 66 IN | SYSTOLIC BLOOD PRESSURE: 138 MMHG | DIASTOLIC BLOOD PRESSURE: 75 MMHG | TEMPERATURE: 98.8 F | BODY MASS INDEX: 33.09 KG/M2 | OXYGEN SATURATION: 99 % | WEIGHT: 205.91 LBS

## 2017-03-29 PROCEDURE — 93005 ELECTROCARDIOGRAM TRACING: CPT

## 2017-03-29 NOTE — PERIOP NOTES
[facility]  Preoperative Instructions        Surgery Date 3/30/17          Time of Arrival 1545    1. On the day of your surgery, please report to the Surgical Services Registration Desk and sign in at your designated time. The Surgery Center is located to the right of the Emergency Room. 2. You must have someone with you to drive you home. You should not drive a car for 24 hours following surgery. Please make arrangements for a friend or family member to stay with you for the first 24 hours after your surgery. 3. Do not have anything to eat or drink (including water, gum, mints, coffee, juice) after midnight 3/29/17              . This may not apply to medications prescribed by your physician. Please note special instructions, if applicable. If you are currently taking Plavix, Coumadin, or other blood-thinning agents, contact your surgeon for instructions. 4. We recommend you do not drink any alcoholic beverages for 24 hours before and after your surgery. 5. Have a list of all current medications, including vitamins, herbal supplements and any other over the counter medications. Stop all Aspirin and non-steroidal anti-inflammatory drugs (I.e. Advil, Aleve), as directed by your surgeon's office. Stop all vitamins and herbal supplements seven days prior to your surgery. 6. Wear comfortable clothes. Wear glasses instead of contacts. Do not bring any money or jewelry. Please bring picture ID, insurance card, and any prearranged co-payment or hospital payment. Do not wear make-up, particularly mascara the morning of your surgery. Do not wear nail polish, particularly if you are having foot /hand surgery. Wear your hair loose or down, no ponytails, buns, remberto pins or clips. All body piercings must be removed.   Please shower with antibacterial soap for three consecutive days before and on the morning of surgery, but do not apply any lotions, powders or deodorants after the shower on the day of surgery. Please use a fresh towels after each shower. Please sleep in clean clothes and change bed linens the night before surgery. Please do not shave for 48 hours prior to surgery. Shaving of the face is acceptable. 7. You should understand that if you do not follow these instructions your surgery may be cancelled. If your physical condition changes (I.e. fever, cold or flu) please contact your surgeon as soon as possible. 8. It is important that you be on time. If a situation occurs where you may be late, please call [phone] (OR Holding Area). 9. If you have any questions and or problems, please call [phone] (Pre-admission Testing). 10. Your surgery time may be subject to change. You will receive a phone call the evening prior if your time changes. 11.  If having outpatient surgery, you must have someone to drive you here, stay with you during the duration of your stay, and to drive you home at time of discharge. Special Instructions:NPO per anesthesia    MEDICATIONS TO TAKE THE MORNING OF SURGERY WITH A SIP OF WATER:Dilaudid if needed. I understand a pre-operative phone call will be made to verify my surgery time. In the event that I am not available, I give permission for a message to be left on my answering service and/or with another person?   Yes @ [phone]         ___________________      __________   _________    (Signature of Patient)             (Witness)                (Date and Time)

## 2017-03-29 NOTE — PERIOP NOTES
Called  Anesthesia and spoke to Dr Devendra Viera. Pt may have light breakfast- toast and clear liquid by 0800 am tomorrow for surgery, then NPO. Pt understands instructions.

## 2017-03-30 ENCOUNTER — ANESTHESIA (OUTPATIENT)
Dept: SURGERY | Age: 52
End: 2017-03-30
Payer: COMMERCIAL

## 2017-03-30 ENCOUNTER — HOSPITAL ENCOUNTER (OUTPATIENT)
Age: 52
Setting detail: OUTPATIENT SURGERY
Discharge: HOME | End: 2017-03-30
Attending: PLASTIC SURGERY | Admitting: PLASTIC SURGERY
Payer: COMMERCIAL

## 2017-03-30 ENCOUNTER — ANESTHESIA EVENT (OUTPATIENT)
Dept: SURGERY | Age: 52
End: 2017-03-30
Payer: COMMERCIAL

## 2017-03-30 VITALS
TEMPERATURE: 98 F | BODY MASS INDEX: 32.95 KG/M2 | RESPIRATION RATE: 19 BRPM | DIASTOLIC BLOOD PRESSURE: 83 MMHG | HEART RATE: 79 BPM | SYSTOLIC BLOOD PRESSURE: 130 MMHG | WEIGHT: 205.03 LBS | HEIGHT: 66 IN | OXYGEN SATURATION: 100 %

## 2017-03-30 LAB
ATRIAL RATE: 85 BPM
CALCULATED P AXIS, ECG09: 57 DEGREES
CALCULATED R AXIS, ECG10: 44 DEGREES
CALCULATED T AXIS, ECG11: 42 DEGREES
DIAGNOSIS, 93000: NORMAL
P-R INTERVAL, ECG05: 158 MS
Q-T INTERVAL, ECG07: 362 MS
QRS DURATION, ECG06: 88 MS
QTC CALCULATION (BEZET), ECG08: 430 MS
VENTRICULAR RATE, ECG03: 85 BPM

## 2017-03-30 PROCEDURE — 74011250636 HC RX REV CODE- 250/636: Performed by: PLASTIC SURGERY

## 2017-03-30 PROCEDURE — 77030018836 HC SOL IRR NACL ICUM -A: Performed by: PLASTIC SURGERY

## 2017-03-30 PROCEDURE — 77030011640 HC PAD GRND REM COVD -A: Performed by: PLASTIC SURGERY

## 2017-03-30 PROCEDURE — 74011000250 HC RX REV CODE- 250: Performed by: PLASTIC SURGERY

## 2017-03-30 PROCEDURE — 74011250636 HC RX REV CODE- 250/636: Performed by: ANESTHESIOLOGY

## 2017-03-30 PROCEDURE — 77030031139 HC SUT VCRL2 J&J -A: Performed by: PLASTIC SURGERY

## 2017-03-30 PROCEDURE — 76010000138 HC OR TIME 0.5 TO 1 HR: Performed by: PLASTIC SURGERY

## 2017-03-30 PROCEDURE — 77030020143 HC AIRWY LARYN INTUB CGAS -A: Performed by: NURSE ANESTHETIST, CERTIFIED REGISTERED

## 2017-03-30 PROCEDURE — 74011000250 HC RX REV CODE- 250

## 2017-03-30 PROCEDURE — 76210000006 HC OR PH I REC 0.5 TO 1 HR: Performed by: PLASTIC SURGERY

## 2017-03-30 PROCEDURE — 77030002986 HC SUT PROL J&J -A: Performed by: PLASTIC SURGERY

## 2017-03-30 PROCEDURE — 74011250636 HC RX REV CODE- 250/636

## 2017-03-30 PROCEDURE — 76060000032 HC ANESTHESIA 0.5 TO 1 HR: Performed by: PLASTIC SURGERY

## 2017-03-30 PROCEDURE — 77030002933 HC SUT MCRYL J&J -A: Performed by: PLASTIC SURGERY

## 2017-03-30 PROCEDURE — 76210000020 HC REC RM PH II FIRST 0.5 HR: Performed by: PLASTIC SURGERY

## 2017-03-30 PROCEDURE — 77030020782 HC GWN BAIR PAWS FLX 3M -B

## 2017-03-30 RX ORDER — HYDROMORPHONE HYDROCHLORIDE 1 MG/ML
.2-.5 INJECTION, SOLUTION INTRAMUSCULAR; INTRAVENOUS; SUBCUTANEOUS
Status: DISCONTINUED | OUTPATIENT
Start: 2017-03-30 | End: 2017-03-30 | Stop reason: HOSPADM

## 2017-03-30 RX ORDER — SODIUM CHLORIDE 0.9 % (FLUSH) 0.9 %
5-10 SYRINGE (ML) INJECTION EVERY 8 HOURS
Status: DISCONTINUED | OUTPATIENT
Start: 2017-03-30 | End: 2017-03-30 | Stop reason: HOSPADM

## 2017-03-30 RX ORDER — HYDROMORPHONE HYDROCHLORIDE 2 MG/1
2 TABLET ORAL
Status: DISCONTINUED | OUTPATIENT
Start: 2017-03-30 | End: 2017-03-30 | Stop reason: HOSPADM

## 2017-03-30 RX ORDER — ONDANSETRON 2 MG/ML
4 INJECTION INTRAMUSCULAR; INTRAVENOUS AS NEEDED
Status: DISCONTINUED | OUTPATIENT
Start: 2017-03-30 | End: 2017-03-30 | Stop reason: HOSPADM

## 2017-03-30 RX ORDER — ONDANSETRON 2 MG/ML
INJECTION INTRAMUSCULAR; INTRAVENOUS AS NEEDED
Status: DISCONTINUED | OUTPATIENT
Start: 2017-03-30 | End: 2017-03-30 | Stop reason: HOSPADM

## 2017-03-30 RX ORDER — PROPOFOL 10 MG/ML
INJECTION, EMULSION INTRAVENOUS AS NEEDED
Status: DISCONTINUED | OUTPATIENT
Start: 2017-03-30 | End: 2017-03-30 | Stop reason: HOSPADM

## 2017-03-30 RX ORDER — CEFAZOLIN SODIUM IN 0.9 % NACL 2 G/100 ML
2 PLASTIC BAG, INJECTION (ML) INTRAVENOUS
Status: COMPLETED | OUTPATIENT
Start: 2017-03-30 | End: 2017-03-30

## 2017-03-30 RX ORDER — FENTANYL CITRATE 50 UG/ML
25 INJECTION, SOLUTION INTRAMUSCULAR; INTRAVENOUS
Status: DISCONTINUED | OUTPATIENT
Start: 2017-03-30 | End: 2017-03-30 | Stop reason: HOSPADM

## 2017-03-30 RX ORDER — FENTANYL CITRATE 50 UG/ML
INJECTION, SOLUTION INTRAMUSCULAR; INTRAVENOUS AS NEEDED
Status: DISCONTINUED | OUTPATIENT
Start: 2017-03-30 | End: 2017-03-30 | Stop reason: HOSPADM

## 2017-03-30 RX ORDER — LIDOCAINE HYDROCHLORIDE AND EPINEPHRINE 10; 10 MG/ML; UG/ML
INJECTION, SOLUTION INFILTRATION; PERINEURAL AS NEEDED
Status: DISCONTINUED | OUTPATIENT
Start: 2017-03-30 | End: 2017-03-30 | Stop reason: HOSPADM

## 2017-03-30 RX ORDER — SODIUM CHLORIDE 0.9 % (FLUSH) 0.9 %
5-10 SYRINGE (ML) INJECTION AS NEEDED
Status: DISCONTINUED | OUTPATIENT
Start: 2017-03-30 | End: 2017-03-30 | Stop reason: HOSPADM

## 2017-03-30 RX ORDER — MIDAZOLAM HYDROCHLORIDE 1 MG/ML
INJECTION, SOLUTION INTRAMUSCULAR; INTRAVENOUS AS NEEDED
Status: DISCONTINUED | OUTPATIENT
Start: 2017-03-30 | End: 2017-03-30 | Stop reason: HOSPADM

## 2017-03-30 RX ORDER — SODIUM CHLORIDE, SODIUM LACTATE, POTASSIUM CHLORIDE, CALCIUM CHLORIDE 600; 310; 30; 20 MG/100ML; MG/100ML; MG/100ML; MG/100ML
25 INJECTION, SOLUTION INTRAVENOUS CONTINUOUS
Status: DISCONTINUED | OUTPATIENT
Start: 2017-03-30 | End: 2017-03-30 | Stop reason: HOSPADM

## 2017-03-30 RX ORDER — HYDROMORPHONE HYDROCHLORIDE 2 MG/1
4 TABLET ORAL
Status: DISCONTINUED | OUTPATIENT
Start: 2017-03-30 | End: 2017-03-30 | Stop reason: HOSPADM

## 2017-03-30 RX ORDER — DEXAMETHASONE SODIUM PHOSPHATE 4 MG/ML
INJECTION, SOLUTION INTRA-ARTICULAR; INTRALESIONAL; INTRAMUSCULAR; INTRAVENOUS; SOFT TISSUE AS NEEDED
Status: DISCONTINUED | OUTPATIENT
Start: 2017-03-30 | End: 2017-03-30 | Stop reason: HOSPADM

## 2017-03-30 RX ORDER — ONDANSETRON 2 MG/ML
8 INJECTION INTRAMUSCULAR; INTRAVENOUS
Status: DISCONTINUED | OUTPATIENT
Start: 2017-03-30 | End: 2017-03-30 | Stop reason: HOSPADM

## 2017-03-30 RX ORDER — ACETAMINOPHEN 10 MG/ML
INJECTION, SOLUTION INTRAVENOUS AS NEEDED
Status: DISCONTINUED | OUTPATIENT
Start: 2017-03-30 | End: 2017-03-30 | Stop reason: HOSPADM

## 2017-03-30 RX ORDER — MORPHINE SULFATE 10 MG/ML
2 INJECTION, SOLUTION INTRAMUSCULAR; INTRAVENOUS
Status: DISCONTINUED | OUTPATIENT
Start: 2017-03-30 | End: 2017-03-30 | Stop reason: HOSPADM

## 2017-03-30 RX ORDER — DIPHENHYDRAMINE HYDROCHLORIDE 50 MG/ML
12.5 INJECTION, SOLUTION INTRAMUSCULAR; INTRAVENOUS AS NEEDED
Status: DISCONTINUED | OUTPATIENT
Start: 2017-03-30 | End: 2017-03-30 | Stop reason: HOSPADM

## 2017-03-30 RX ORDER — LIDOCAINE HYDROCHLORIDE 20 MG/ML
INJECTION, SOLUTION EPIDURAL; INFILTRATION; INTRACAUDAL; PERINEURAL AS NEEDED
Status: DISCONTINUED | OUTPATIENT
Start: 2017-03-30 | End: 2017-03-30 | Stop reason: HOSPADM

## 2017-03-30 RX ADMIN — PROPOFOL 200 MG: 10 INJECTION, EMULSION INTRAVENOUS at 18:32

## 2017-03-30 RX ADMIN — ACETAMINOPHEN 1000 MG: 10 INJECTION, SOLUTION INTRAVENOUS at 18:56

## 2017-03-30 RX ADMIN — FENTANYL CITRATE 25 MCG: 50 INJECTION, SOLUTION INTRAMUSCULAR; INTRAVENOUS at 18:32

## 2017-03-30 RX ADMIN — ONDANSETRON 4 MG: 2 INJECTION INTRAMUSCULAR; INTRAVENOUS at 18:46

## 2017-03-30 RX ADMIN — LIDOCAINE HYDROCHLORIDE 40 MG: 20 INJECTION, SOLUTION EPIDURAL; INFILTRATION; INTRACAUDAL; PERINEURAL at 18:32

## 2017-03-30 RX ADMIN — CEFAZOLIN 2 G: 10 INJECTION, POWDER, FOR SOLUTION INTRAVENOUS; PARENTERAL at 18:37

## 2017-03-30 RX ADMIN — DEXAMETHASONE SODIUM PHOSPHATE 8 MG: 4 INJECTION, SOLUTION INTRA-ARTICULAR; INTRALESIONAL; INTRAMUSCULAR; INTRAVENOUS; SOFT TISSUE at 18:38

## 2017-03-30 RX ADMIN — FENTANYL CITRATE 25 MCG: 50 INJECTION, SOLUTION INTRAMUSCULAR; INTRAVENOUS at 18:38

## 2017-03-30 RX ADMIN — FENTANYL CITRATE 25 MCG: 50 INJECTION, SOLUTION INTRAMUSCULAR; INTRAVENOUS at 18:36

## 2017-03-30 RX ADMIN — SODIUM CHLORIDE, POTASSIUM CHLORIDE, SODIUM LACTATE AND CALCIUM CHLORIDE: 600; 310; 30; 20 INJECTION, SOLUTION INTRAVENOUS at 18:15

## 2017-03-30 RX ADMIN — MIDAZOLAM HYDROCHLORIDE 2 MG: 1 INJECTION, SOLUTION INTRAMUSCULAR; INTRAVENOUS at 18:25

## 2017-03-30 RX ADMIN — FENTANYL CITRATE 25 MCG: 50 INJECTION, SOLUTION INTRAMUSCULAR; INTRAVENOUS at 19:00

## 2017-03-30 RX ADMIN — FENTANYL CITRATE 25 MCG: 50 INJECTION, SOLUTION INTRAMUSCULAR; INTRAVENOUS at 18:40

## 2017-03-30 NOTE — DISCHARGE INSTRUCTIONS
Juliet Steven M.D., F.A.C.S. [address] [facility]  [phone]    FOLLOW UP VISIT Appointment in: Please call Dr. Aye Patel office for drain removal when output is <30 cc/24 hr period. [facility] an appointment to see Dr. Vianey Steven in 2 weeks in the office. GARRET Drain Instructions    Purpose: You have had surgery during which a Angel-Lopez drain, or GARRET drain, has been placed by your surgeon. A GARRET drain is a rubber tube which goes under the skin and drains excess fluid during the healing process so that this fluid does not accumulate. There is a one-way valve which allows the fluid to collect in the bulb on the end of the drain. The drain is usually held in place by a single stitch. The color of the drainage can range from reddish to pink to straw-colored. Care of the drain:  Caring for the GARRET drain is fairly easy. First, protect the drain so that it does not get pulled inadvertently. You may fasten them to your clothing with a safety pin through the floppy tag on the bulb. DO NOT place a pin through either the drain tubing or the bulb itself. The drain works by maintaining a constant low pressure of suction when the bulb is in the collapsed (squeezed in) position. If the bulb will not maintain this collapsed position when it is recapped, please call the office, as the drain may not be working properly. If you had an On-Q® pain pump placed during your surgery, you may wish to keep the black tim pack once the On-Q® has been removed to carry the GARRET drain bulbs. Measuring the drainage:  Grasp the bulb in one hand and remove the cap with the other hand. This will cause the bulb to relax into a round shape. Holding the open end over a specimen cup, squirt the fluid into the cup by squeezing the bulb. Once the bulb is empty, squeeze it in (collapse it) with one hand, and replace the cap with your other hand.   Then holding the measuring cup level, note how much fluid there is (in milliliters, mL), and record this number on the chart below. Discard the fluid in the toilet and rinse out the specimen cup. Note: your specimen cup may be in cubic centimeters (cc). 1 cc = 1 mL. Removal:  The GARRET drain will be removed in the office when the daily drainage is at a low enough level. You should call the office when your measuring total is equal to or less than 30 cc for an entire 24 hour period, as it will be ready to be removed. Removal involves minimal discomfort without the need for anesthesia. The drain site in the skin heals on its own once the drain is removed without any further stitches. Showering: You may shower while you have one or more GARRET drains in. Just let the water run over the drain sites and then pat them dry when you are done. Some patients like to place a long string necklace around their necks during showers to which they can safety pin the tag on the bulb to prevent it from dangling. DO NOT take a tub bath, go swimming (pool or lake/ocean), or otherwise submerge your body in water before all GARRET drains have been removed and you are permitted by your surgeon. Activity:  No strenuous activity or heavy lifting.             Things to look out for:  Please call the office immediately ([phone]) if you notice:   Sudden bright or dark red bleeding into the bulb or around the drain site in the skin   Dislodgment of the GARRET drain or if the drain falls out   Spreading redness around the drain site in the skin   Cloudy or foul-smelling drainage in the bulb or around the drain site   Fever, shaking chills, excessive swelling, pain or discomfort   Any other concerns or questions        Date           #1 (AM)             #1 (PM)             Daily Total #1  (AM+PM)             #2 (AM)           #2 (PM)           Daily  Total  #2  (AM+PM)           #3 (AM)           #3 (PM)           Daily  Total  #3  (AM+PM)           #4 (AM)             #4 (PM)             Daily Total #4  (AM+PM) How to Care for Your Wound After Its Treated With  DERMABOND* Topical Skin Adhesive  DERMABOND* Topical Skin Adhesive (2-octyl cyanoacrylate) is a sterile, liquid skin adhesive  that holds wound edges together. The film will usually remain in place for 5 to 10 days, then  naturally fall off your skin. The following will answer some of your questions and provide instructions for proper care for your  wound while it is healing:    CHECK WOUND APPEARANCE   Some swelling, redness, and pain are common with all wounds and normally will go away as the  wound heals. If swelling, redness, or pain increases or if the wound feels warm to the touch,  contact a doctor. Also contact a doctor if the wound edges reopen or separate. REPLACE BANDAGES   If your wound is bandaged, keep the bandage dry.  Replace the dressing daily until the adhesive film has fallen off or if the  bandage should become wet, unless otherwise instructed by your  physician.  When changing the dressing, do not place tape directly over the  DERMABOND adhesive film, because removing the tape later may also  remove the film. AVOID TOPICAL MEDICATIONS   Do not apply liquid or ointment medications or any other product to your wound while the  DERMABOND adhesive film is in place. These may loosen the film before your wound is healed. KEEP WOUND DRY AND PROTECTED   You may occasionally and briefly wet your wound in the shower or bath. Do not soak or scrub  your wound, do not swim, and avoid periods of heavy perspiration until the DERMABOND  adhesive has naturally fallen off. After showering or bathing, gently blot your wound dry with a  soft towel. If a protective dressing is being used, apply a fresh, dry bandage, being sure to keep  the tape off the DERMABOND adhesive film.  Apply a clean, dry bandage over the wound if necessary to protect it.    Protect your wound from injury until the skin has had sufficient time to heal.   Do not scratch, rub, or pick at the DERMABOND adhesive film. This may loosen the film before  your wound is healed.  Protect the wound from prolonged exposure to sunlight or tanning lamps while the film is in  place. If you have any questions or concerns about this product, please consult your doctor. *Trademark ©[employer] 2002    A common side effect of anesthesia following surgery is nausea and/or vomiting. In order to decrease symptoms, it is wise to avoid foods that are high in fat, greasy foods, milk products, and spicy foods for the first 24 hours. Acceptable foods for the first 24 hours following surgery include but are not limited to:     soup   broth    toast    crackers    applesauce    bananas    mashed potatoes,   soft or scrambled eggs   oatmeal    jello    It is important to eat when taking your pain medication. This will help to prevent nausea. If possible, please try to time your meals with your medications. It is very important to stay hydrated following surgery. Sip fluids frequently while awake. Avoid acidic drinks such as citrus juices and soda for 24 hours. Carbonated beverages may cause bloating and gas. Acceptable fluids include:    - water (flavor packets may add variety)  - coffee or tea (in moderation)  - Gatorade  - Cem-aid  - apple juice  - cranberry juice    You are encouraged to cough and deep breathe every hour when awake. This will help to prevent respiratory complications following anesthesia. You may want to hug a pillow when coughing and sneezing to add additional support to the surgical area and to decrease discomfort if you had abdominal or chest surgery. If you are discharged home with support stockings, you may remove them after 24 hours. Support stockings are used to help prevent blood clots in the legs following surgery.     Please take time to review all of your Home Care Instructions and Medication Information sheets provided in your discharge packet. If you have any questions, please contact your surgeons office. Thank you. DISCHARGE SUMMARY from Nurse    The following personal items are in your possession at time of discharge:    Dental Appliances: None  Visual Aid: None  Hearing Aids/Status: Does not own  Home Medications: None  Jewelry: None  Clothing: Socks, Footwear, Pants, Shirt, Undergarments  Other Valuables: None             PATIENT INSTRUCTIONS:    After general anesthesia or intravenous sedation, for 24 hours or while taking prescription Narcotics:  · Limit your activities  · Do not drive and operate hazardous machinery  · Do not make important personal or business decisions  · Do  not drink alcoholic beverages  · If you have not urinated within 8 hours after discharge, please contact your surgeon on call. Report the following to your surgeon:  · Excessive pain, swelling, redness or odor of or around the surgical area  · Temperature over 100.5  · Nausea and vomiting lasting longer than 4 hours or if unable to take medications  · Any signs of decreased circulation or nerve impairment to extremity: change in color, persistent  numbness, tingling, coldness or increase pain  · Any questions        What to do at Home:    These are general instructions for a healthy lifestyle:    No smoking/ No tobacco products/ Avoid exposure to second hand smoke    Surgeon General's Warning:  Quitting smoking now greatly reduces serious risk to your health. Obesity, smoking, and sedentary lifestyle greatly increases your risk for illness    A healthy diet, regular physical exercise & weight monitoring are important for maintaining a healthy lifestyle    You may be retaining fluid if you have a history of heart failure or if you experience any of the following symptoms:  Weight gain of 3 pounds or more overnight or 5 pounds in a week, increased swelling in our hands or feet or shortness of breath while lying flat in bed.   Please call your doctor as soon as you notice any of these symptoms; do not wait until your next office visit. Recognize signs and symptoms of STROKE:    F-face looks uneven    A-arms unable to move or move unevenly    S-speech slurred or non-existent    T-time-call 911 as soon as signs and symptoms begin-DO NOT go       Back to bed or wait to see if you get better-TIME IS BRAIN. Warning Signs of HEART ATTACK     Call 911 if you have these symptoms:   Chest discomfort. Most heart attacks involve discomfort in the center of the chest that lasts more than a few minutes, or that goes away and comes back. It can feel like uncomfortable pressure, squeezing, fullness, or pain.  Discomfort in other areas of the upper body. Symptoms can include pain or discomfort in one or both arms, the back, neck, jaw, or stomach.  Shortness of breath with or without chest discomfort.  Other signs may include breaking out in a cold sweat, nausea, or lightheadedness. Don't wait more than five minutes to call 911 - MINUTES MATTER! Fast action can save your life. Calling 911 is almost always the fastest way to get lifesaving treatment. Emergency Medical Services staff can begin treatment when they arrive -- up to an hour sooner than if someone gets to the hospital by car. The discharge information has been reviewed with the patient and spouse. The patient and spouse verbalized understanding. Discharge medications reviewed with the patient and spouse and appropriate educational materials and side effects teaching were provided. Cefadroxil (By mouth)   Cefadroxil (mij-m-VKIL-il)  Treats bacterial infections. This medicine is a cephalosporin antibiotic. Brand Name(s):Duricef   There may be other brand names for this medicine. When This Medicine Should Not Be Used: You should not use this medicine if you have had an allergic reaction to any cephalosporin medicine such a Keflex®, Ceclor®, Velosef®, or Suprax®.    How to Use This Medicine: Tablet, Capsule, Liquid  · Your doctor will tell you how much to take and how often. · You may take your medicine with food to avoid an upset stomach. · Shake the oral liquid well each time you use it. · Carefully measure the oral liquid dose with a measuring spoon or medicine cup. · Keep taking your medicine until your doctor tells you to stop, even if you feel better. If you stop taking the medicine too soon, your infection may come back. If a dose is missed:   · Take the missed dose as soon as possible. · If it is almost time for your next regular dose, wait until then to take the medicine and skip the missed dose. · You should not use two doses at the same time. How to Store and Dispose of This Medicine:   · Store tablets and capsules at room temperature away from heat, moisture, and light. · You may keep the oral liquid in the refrigerator for 14 days. After that, throw away any unused liquid. Do not freeze. · Keep all medicine out of the reach of children. Drugs and Foods to Avoid:   Ask your doctor or pharmacist before using any other medicine, including over-the-counter medicines, vitamins, and herbal products. · Make sure your doctor knows if you are taking birth control pills or probenecid (Benemid® or ColBENEMID®) while taking cefadroxil. Warnings While Using This Medicine:   · Before taking this medicine, let your doctor know if you have ever had an allergic reaction to penicillin or if you have kidney disease. · If you have severe diarrhea while taking this medicine, check with your doctor before taking medicine to stop the diarrhea. · Cefadroxil may cause incorrect results with some tests used by patients with diabetes to check for sugar in urine. · If you are pregnant or breastfeeding, talk to your doctor before taking this medicine.   Possible Side Effects While Using This Medicine:   Call your doctor right away if you notice any of these side effects:  · Rash or hives  · Swelling of the face, throat, or lips  · Wheezing or trouble breathing  · Severe diarrhea (watery or bloody)  · Severe vomiting or stomach pain  · Blistering or peeling skin  If you notice these less serious side effects, talk with your doctor:   · Mild diarrhea  · Nausea  · Sore mouth or tongue  · Vaginal itching or discharge  · Joint aches and pains  If you notice other side effects that you think are caused by this medicine, tell your doctor. Call your doctor for medical advice about side effects. You may report side effects to FDA at 3-269-AFO-3350  © 2016 [address] is for End User's use only and may not be sold, redistributed or otherwise used for commercial purposes. The above information is an  only. It is not intended as medical advice for individual conditions or treatments. Talk to your doctor, nurse or pharmacist before following any medical regimen to see if it is safe and effective for you.

## 2017-03-30 NOTE — PERIOP NOTES
Handoff Report from Operating Room to PACU    Report received from Kiarra Xiao RN and ANNA Del Toro CRNA regarding Venu Ramirez. Surgeon(s):  Ron Porter MD  And Procedure(s) (LRB):  EVACUATE RIGHT BREAST SEROMA, DRAIN PLACEMENT  (Right)  confirmed   with allergies, drains and dressings discussed. Anesthesia type, drugs, patient history, complications, estimated blood loss, vital signs, intake and output, and last pain medication, lines and temperature were reviewed.

## 2017-03-30 NOTE — H&P
Pre-op History and Physical    CC: BREAST CANCER    HPI: 46y.o. year old female with BREAST CANCER  for Procedure(s):  EVACUATE RIGHT BREAST SEROMA DRAIN PLACEMENT . Past medical history:   Past Medical History:   Diagnosis Date    Atopic dermatitis     Breast cancer, right ([address] 07/2016    Pleurisy 2006    Thyroid nodule     benign      Past surgical history:   Past Surgical History:   Procedure Laterality Date    HX BREAST BIOPSY Right 2016    HX BREAST RECONSTRUCTION Right 2/23/2017    RIGHT BREAST RECONSTRUCTION  /  TISSUE EXPANDER / ALLODERM  performed by Kunal Bishop MD at [facility]    [facility]      [facility]    HX MASTECTOMY Right 2/23/2017    RIGHT BREAST TOTAL MASTECTOMY, AXILLARY LYMPH NODE DISSECTION, PORTACATH REMOVAL performed by Gagan Munoz MD at [facility] OR    HX TONSILLECTOMY  1974    HX VASCULAR ACCESS  2016      Family history:   Family History   Problem Relation Age of Onset    Colon Cancer Mother 39    Colon Cancer Paternal Uncle 79    Colon Cancer Paternal Uncle 79      Social history:   Social History     Social History    Marital status:      Spouse name: N/A    Number of children: N/A    Years of education: N/A     Occupational History    Not on file. Social History Main Topics    Smoking status: Never Smoker    Smokeless tobacco: Never Used    Alcohol use No    Drug use: No    Sexual activity: Not on file     Other Topics Concern    Not on file     Social History Narrative      Home Medications:   Prior to Admission medications    Medication Sig Start Date End Date Taking? Authorizing Provider   HYDROmorphone (DILAUDID) 2 mg tablet as needed. 2/25/17   Historical Provider   ferrous sulfate 324 mg (65 mg iron) tablet Take  by mouth Daily (before breakfast). Historical Provider   cholecalciferol (VITAMIN D3) 1,000 unit cap Take  by mouth daily. Historical Provider   OTHER,NON-FORMULARY, Take 1 Tab by mouth three (3) times daily.  Probiotic 11 and protease    Historical Provider      Allergies: No Known Allergies   Review of systems:  Denies headache, fever, chills, weight change, congestion, sore throat, chest pain, shortness of breath, nausea, vomiting, diarrhea, constipation, abdominal pain, generalized weakness, muscle or joint pain, and rash. Physical Exam:  Vitals: Height 5' 6\" (1.676 m), weight 92.1 kg (203 lb), last menstrual period 06/24/2016.    General: awake and alert, NAD  Neck: supple  Cor: RRR  Lungs: clear  Abdomen: soft, non-tender, non-distended  Extremities: no edema  Skin: no rash    Impression: BREAST CANCER     Plan:  Procedure(s):  EVACUATE RIGHT BREAST SEROMA DRAIN PLACEMENT

## 2017-03-30 NOTE — IP AVS SNAPSHOT
Current Discharge Medication List  
  
CONTINUE these medications which have NOT CHANGED Dose & Instructions Dispensing Information Comments Morning Noon Evening Bedtime  
 ferrous sulfate 324 mg (65 mg iron) tablet Your last dose was: Your next dose is: Take  by mouth Daily (before breakfast). Refills:  0 HYDROmorphone 2 mg tablet Commonly known as:  DILAUDID Your last dose was: Your next dose is:    
   
   
 as needed. Refills:  0  
     
   
   
   
  
 OTHER(NON-FORMULARY) Your last dose was: Your next dose is:    
   
   
 Dose:  1 Tab Take 1 Tab by mouth three (3) times daily. Probiotic 11 and protease Refills:  0  
     
   
   
   
  
 VITAMIN D3 1,000 unit Cap Generic drug:  cholecalciferol Your last dose was: Your next dose is: Take  by mouth daily. Refills:  0

## 2017-03-30 NOTE — BRIEF OP NOTE
BRIEF OPERATIVE NOTE    Date of Procedure: 3/30/2017   Preoperative Diagnosis: BREAST CANCER   Postoperative Diagnosis: BREAST CANCER     Procedure(s):  EVACUATE RIGHT BREAST SEROMA, DRAIN PLACEMENT   Surgeon(s) and Role:     * Crow Rosario MD - Primary            Surgical Staff:  Circ-1: Brenden Wheeler  Scrub Tech-1: Katrin Uribe  Event Time In   Incision Start 1843   Incision Close 1904     Anesthesia: General   Estimated Blood Loss: 1 mL  Specimens: * No specimens in log *   Findings: approx 100 ml persistent seroma fluid, right breast, which was evacuated   Complications: none  Implants: * No implants in log *

## 2017-03-30 NOTE — IP AVS SNAPSHOT
[address] [facility] 
[phone] Patient: Darien Moses MRN: [id number] :1965 You are allergic to the following No active allergies Recent Documentation Height Weight BMI OB Status Smoking Status 1.676 m 93 kg 33.09 kg/m2 Postmenopausal Never Smoker Emergency Contacts Name Discharge Info Relation Home Work Mobile Homero Koch DISCHARGE CAREGIVER [3] Spouse [3] [phone] About your hospitalization You were admitted on:  2017 You last received care in the:  [facility] You were discharged on:  2017 Unit phone number:  [phone] Why you were hospitalized Your primary diagnosis was:  Not on File Providers Seen During Your Hospitalizations Provider Role Specialty Primary office phone Bala Izquierdo MD Attending Provider Plastic Surgery [phone] Your Primary Care Physician (PCP) Primary Care Physician Office Phone Office Fax Bonita Mancia [phone] Follow-up Information Follow up With Details Comments Contact Info Ksenia Osorio MD   [facility] [address] [place] [address] Route [facility] 
[phone] Bala Izquierdo MD In 2 weeks  [address] [facility] [address] [facility] 
[phone] Current Discharge Medication List  
  
CONTINUE these medications which have NOT CHANGED Dose & Instructions Dispensing Information Comments Morning Noon Evening Bedtime  
 ferrous sulfate 324 mg (65 mg iron) tablet Your last dose was: Your next dose is: Take  by mouth Daily (before breakfast). Refills:  0 HYDROmorphone 2 mg tablet Commonly known as:  DILAUDID Your last dose was: Your next dose is:    
   
   
 as needed. Refills:  0 OTHER(NON-FORMULARY) Your last dose was: Your next dose is:    
   
   
 Dose:  1 Tab Take 1 Tab by mouth three (3) times daily. Probiotic 11 and protease Refills:  0  
     
   
   
   
  
 VITAMIN D3 1,000 unit Cap Generic drug:  cholecalciferol Your last dose was: Your next dose is: Take  by mouth daily. Refills:  0 Discharge Instructions Amy Mcmahan M.D., F.A.C.S. D.[facility] [phone] FOLLOW UP VISIT Appointment in: Please call Dr. Ashely Tarango office for drain removal when output is <30 cc/24 hr period. [facility] an appointment to see Dr. Trino Mcmahan in 2 weeks in the office. GARRET Drain Instructions Purpose: You have had surgery during which a Angel-Lopez drain, or GARRET drain, has been placed by your surgeon. A GARRET drain is a rubber tube which goes under the skin and drains excess fluid during the healing process so that this fluid does not accumulate. There is a one-way valve which allows the fluid to collect in the bulb on the end of the drain. The drain is usually held in place by a single stitch. The color of the drainage can range from reddish to pink to straw-colored. Care of the drain:  Caring for the GARRET drain is fairly easy. First, protect the drain so that it does not get pulled inadvertently. You may fasten them to your clothing with a safety pin through the floppy tag on the bulb. DO NOT place a pin through either the drain tubing or the bulb itself. The drain works by maintaining a constant low pressure of suction when the bulb is in the collapsed (squeezed in) position. If the bulb will not maintain this collapsed position when it is recapped, please call the office, as the drain may not be working properly. If you had an On-Q® pain pump placed during your surgery, you may wish to keep the black tim pack once the On-Q® has been removed to carry the GARRET drain bulbs. Measuring the drainage:  Grasp the bulb in one hand and remove the cap with the other hand. This will cause the bulb to relax into a round shape. Holding the open end over a specimen cup, squirt the fluid into the cup by squeezing the bulb. Once the bulb is empty, squeeze it in (collapse it) with one hand, and replace the cap with your other hand. Then holding the measuring cup level, note how much fluid there is (in milliliters, mL), and record this number on the chart below. Discard the fluid in the toilet and rinse out the specimen cup. Note: your specimen cup may be in cubic centimeters (cc). 1 cc = 1 mL. Removal:  The GARRET drain will be removed in the office when the daily drainage is at a low enough level. You should call the office when your measuring total is equal to or less than 30 cc for an entire 24 hour period, as it will be ready to be removed. Removal involves minimal discomfort without the need for anesthesia. The drain site in the skin heals on its own once the drain is removed without any further stitches. Showering: You may shower while you have one or more GARRET drains in. Just let the water run over the drain sites and then pat them dry when you are done. Some patients like to place a long string necklace around their necks during showers to which they can safety pin the tag on the bulb to prevent it from dangling. DO NOT take a tub bath, go swimming (pool or lake/ocean), or otherwise submerge your body in water before all GARRET drains have been removed and you are permitted by your surgeon. Activity:  No strenuous activity or heavy lifting. Things to look out for:  Please call the office immediately ([phone]) if you notice: 
? Sudden bright or dark red bleeding into the bulb or around the drain site in the skin ? Dislodgment of the GARRET drain or if the drain falls out ? Spreading redness around the drain site in the skin ? Cloudy or foul-smelling drainage in the bulb or around the drain site ? Fever, shaking chills, excessive swelling, pain or discomfort ? Any other concerns or questions Date #1 (AM) #1 (PM) Daily Total #1 
(AM+PM) #2 (AM) #2 (PM) Daily Total 
#2 
(AM+PM) #3 (AM) #3 (PM) Daily Total 
#3 
(AM+PM) #4 (AM) #4 (PM) Daily Total #4 
(AM+PM) How to Care for Your Wound After Its Treated With DERMABOND* Topical Skin Adhesive DERMABOND* Topical Skin Adhesive (2-octyl cyanoacrylate) is a sterile, liquid skin adhesive 
that holds wound edges together. The film will usually remain in place for 5 to 10 days, then 
naturally fall off your skin. The following will answer some of your questions and provide instructions for proper care for your 
wound while it is healing: CHECK WOUND APPEARANCE 
 Some swelling, redness, and pain are common with all wounds and normally will go away as the 
wound heals. If swelling, redness, or pain increases or if the wound feels warm to the touch, 
contact a doctor. Also contact a doctor if the wound edges reopen or separate. REPLACE BANDAGES 
 If your wound is bandaged, keep the bandage dry.  Replace the dressing daily until the adhesive film has fallen off or if the 
bandage should become wet, unless otherwise instructed by your 
physician.  When changing the dressing, do not place tape directly over the DERMABOND adhesive film, because removing the tape later may also 
remove the film. AVOID TOPICAL MEDICATIONS  Do not apply liquid or ointment medications or any other product to your wound while the DERMABOND adhesive film is in place. These may loosen the film before your wound is healed. KEEP WOUND DRY AND PROTECTED  You may occasionally and briefly wet your wound in the shower or bath. Do not soak or scrub your wound, do not swim, and avoid periods of heavy perspiration until the DERMABOND 
adhesive has naturally fallen off. After showering or bathing, gently blot your wound dry with a 
soft towel. If a protective dressing is being used, apply a fresh, dry bandage, being sure to keep 
the tape off the DERMABOND adhesive film.  Apply a clean, dry bandage over the wound if necessary to protect it.  Protect your wound from injury until the skin has had sufficient time to heal. 
 Do not scratch, rub, or pick at the DERMABOND adhesive film. This may loosen the film before 
your wound is healed.  Protect the wound from prolonged exposure to sunlight or tanning lamps while the film is in 
place. If you have any questions or concerns about this product, please consult your doctor. *Trademark ©[employer] 2002 A common side effect of anesthesia following surgery is nausea and/or vomiting. In order to decrease symptoms, it is wise to avoid foods that are high in fat, greasy foods, milk products, and spicy foods for the first 24 hours. Acceptable foods for the first 24 hours following surgery include but are not limited to: 
 
? soup 
? broth 
?  toast  
? crackers ? applesauce 
? bananas  
? mashed potatoes, 
? soft or scrambled eggs 
? oatmeal 
?  jello It is important to eat when taking your pain medication. This will help to prevent nausea. If possible, please try to time your meals with your medications. It is very important to stay hydrated following surgery. Sip fluids frequently while awake. Avoid acidic drinks such as citrus juices and soda for 24 hours. Carbonated beverages may cause bloating and gas. Acceptable fluids include: 
 
? water (flavor packets may add variety) ? coffee or tea (in moderation) ? Gatorade ? Chemo Green ? apple juice 
? cranberry juice You are encouraged to cough and deep breathe every hour when awake.  This will help to prevent respiratory complications following anesthesia. You may want to hug a pillow when coughing and sneezing to add additional support to the surgical area and to decrease discomfort if you had abdominal or chest surgery. If you are discharged home with support stockings, you may remove them after 24 hours. Support stockings are used to help prevent blood clots in the legs following surgery. Please take time to review all of your Home Care Instructions and Medication Information sheets provided in your discharge packet. If you have any questions, please contact your surgeons office. Thank you. DISCHARGE SUMMARY from Nurse The following personal items are in your possession at time of discharge: 
 
Dental Appliances: None Visual Aid: None Hearing Aids/Status: Does not own Home Medications: None Jewelry: None Clothing: Socks, Footwear, Pants, Shirt, Undergarments Other Valuables: None PATIENT INSTRUCTIONS: 
 
After general anesthesia or intravenous sedation, for 24 hours or while taking prescription Narcotics: · Limit your activities · Do not drive and operate hazardous machinery · Do not make important personal or business decisions · Do  not drink alcoholic beverages · If you have not urinated within 8 hours after discharge, please contact your surgeon on call. Report the following to your surgeon: 
· Excessive pain, swelling, redness or odor of or around the surgical area · Temperature over 100.5 · Nausea and vomiting lasting longer than 4 hours or if unable to take medications · Any signs of decreased circulation or nerve impairment to extremity: change in color, persistent  numbness, tingling, coldness or increase pain · Any questions What to do at Home: These are general instructions for a healthy lifestyle: No smoking/ No tobacco products/ Avoid exposure to second hand smoke Surgeon General's Warning:  Quitting smoking now greatly reduces serious risk to your health. Obesity, smoking, and sedentary lifestyle greatly increases your risk for illness A healthy diet, regular physical exercise & weight monitoring are important for maintaining a healthy lifestyle You may be retaining fluid if you have a history of heart failure or if you experience any of the following symptoms:  Weight gain of 3 pounds or more overnight or 5 pounds in a week, increased swelling in our hands or feet or shortness of breath while lying flat in bed. Please call your doctor as soon as you notice any of these symptoms; do not wait until your next office visit. Recognize signs and symptoms of STROKE: 
 
F-face looks uneven A-arms unable to move or move unevenly S-speech slurred or non-existent T-time-call 911 as soon as signs and symptoms begin-DO NOT go Back to bed or wait to see if you get better-TIME IS BRAIN. Warning Signs of HEART ATTACK Call 911 if you have these symptoms: 
? Chest discomfort. Most heart attacks involve discomfort in the center of the chest that lasts more than a few minutes, or that goes away and comes back. It can feel like uncomfortable pressure, squeezing, fullness, or pain. ? Discomfort in other areas of the upper body. Symptoms can include pain or discomfort in one or both arms, the back, neck, jaw, or stomach. ? Shortness of breath with or without chest discomfort. ? Other signs may include breaking out in a cold sweat, nausea, or lightheadedness. Don't wait more than five minutes to call [address]! Fast action can save your life. Calling 911 is almost always the fastest way to get lifesaving treatment. Emergency Medical Services staff can begin treatment when they arrive  up to an hour sooner than if someone gets to the hospital by car. The discharge information has been reviewed with the patient and spouse. The patient and spouse verbalized understanding. Discharge medications reviewed with the patient and spouse and appropriate educational materials and side effects teaching were provided. Cefadroxil (By mouth) Cefadroxil (bvt-d-CIBP-il) Treats bacterial infections. This medicine is a cephalosporin antibiotic. Brand Name(s):Duricef There may be other brand names for this medicine. When This Medicine Should Not Be Used: You should not use this medicine if you have had an allergic reaction to any cephalosporin medicine such a Keflex®, Ceclor®, Velosef®, or Suprax®. How to Use This Medicine:  
Tablet, Capsule, Liquid · Your doctor will tell you how much to take and how often. · You may take your medicine with food to avoid an upset stomach. · Shake the oral liquid well each time you use it. · Carefully measure the oral liquid dose with a measuring spoon or medicine cup. · Keep taking your medicine until your doctor tells you to stop, even if you feel better. If you stop taking the medicine too soon, your infection may come back. If a dose is missed: · Take the missed dose as soon as possible. · If it is almost time for your next regular dose, wait until then to take the medicine and skip the missed dose. · You should not use two doses at the same time. How to Store and Dispose of This Medicine: · Store tablets and capsules at room temperature away from heat, moisture, and light. · You may keep the oral liquid in the refrigerator for 14 days. After that, throw away any unused liquid. Do not freeze. · Keep all medicine out of the reach of children. Drugs and Foods to Avoid: Ask your doctor or pharmacist before using any other medicine, including over-the-counter medicines, vitamins, and herbal products. · Make sure your doctor knows if you are taking birth control pills or probenecid (Benemid® or ColBENEMID®) while taking cefadroxil. Warnings While Using This Medicine: · Before taking this medicine, let your doctor know if you have ever had an allergic reaction to penicillin or if you have kidney disease. · If you have severe diarrhea while taking this medicine, check with your doctor before taking medicine to stop the diarrhea. · Cefadroxil may cause incorrect results with some tests used by patients with diabetes to check for sugar in urine. · If you are pregnant or breastfeeding, talk to your doctor before taking this medicine. Possible Side Effects While Using This Medicine:  
Call your doctor right away if you notice any of these side effects: 
· Rash or hives · Swelling of the face, throat, or lips · Wheezing or trouble breathing · Severe diarrhea (watery or bloody) · Severe vomiting or stomach pain · Blistering or peeling skin If you notice these less serious side effects, talk with your doctor: · Mild diarrhea · Nausea · Sore mouth or tongue · Vaginal itching or discharge · Joint aches and pains If you notice other side effects that you think are caused by this medicine, tell your doctor. Call your doctor for medical advice about side effects. You may report side effects to FDA at 7-343-FDA-5945 © 2016 [address] is for End User's use only and may not be sold, redistributed or otherwise used for commercial purposes. The above information is an  only. It is not intended as medical advice for individual conditions or treatments. Talk to your doctor, nurse or pharmacist before following any medical regimen to see if it is safe and effective for you. Discharge Orders None Introducing [facility]! Dear Jaziel Otero: 
Thank you for requesting a AccountNow account. Our records indicate that you already have an active AccountNow account. You can access your account anytime at [email or web address] Did you know that you can access your hospital and ER discharge instructions at any time in MyChart? You can also review all of your test results from your hospital stay or ER visit. Additional Information If you have questions, please visit the Frequently Asked Questions section of the [email or web address] website at [email or web address] Remember, MyChart is NOT to be used for urgent needs. For medical emergencies, dial 911. Now available from your iPhone and Android! General Information Please provide this summary of care documentation to your next provider. Patient Signature:  ____________________________________________________________ Date:  ____________________________________________________________  
  
[facility] Signature:  ____________________________________________________________ Date:  ____________________________________________________________

## 2017-03-30 NOTE — ANESTHESIA PREPROCEDURE EVALUATION
Anesthetic History   No history of anesthetic complications            Review of Systems / Medical History  Patient summary reviewed, nursing notes reviewed and pertinent labs reviewed    Pulmonary  Within defined limits                 Neuro/Psych   Within defined limits           Cardiovascular  Within defined limits                Exercise tolerance: >4 METS  Comments: 07/16 ECHO: EF 64%   GI/Hepatic/Renal  Within defined limits              Endo/Other      Hypothyroidism  Obesity and cancer (breast cancer)    Comments: H/o benign thyroid nodule Other Findings            Physical Exam    Airway  Mallampati: I  TM Distance: 4 - 6 cm  Neck ROM: normal range of motion   Mouth opening: Normal     Cardiovascular  Regular rate and rhythm,  S1 and S2 normal,  no murmur, click, rub, or gallop             Dental         Pulmonary  Breath sounds clear to auscultation               Abdominal  GI exam deferred       Other Findings            Anesthetic Plan    ASA: 2  Anesthesia type: general            Anesthetic plan and risks discussed with: Patient

## 2017-03-31 NOTE — ANESTHESIA POSTPROCEDURE EVALUATION
Post-Anesthesia Evaluation and Assessment    Patient: Ankur Elliott MRN: [id number]  SSN: [id number]    YOB: 1965  Age: 46 y.o. Sex: female       Cardiovascular Function/Vital Signs  Visit Vitals    /83    Pulse 79    Temp 36.7 °C (98 °F)    Resp 19    Ht 5' 6\" (1.676 m)    Wt 93 kg (205 lb 0.4 oz)    SpO2 100%    BMI 33.09 kg/m2       Patient is status post general anesthesia for Procedure(s):  EVACUATE RIGHT BREAST SEROMA, DRAIN PLACEMENT . Nausea/Vomiting: None    Postoperative hydration reviewed and adequate. Pain:  Pain Scale 1: Numeric (0 - 10) (03/30/17 1945)  Pain Intensity 1: 0 (03/30/17 1945)   Managed    Neurological Status:   Neuro (WDL): Exceptions to [facility] (03/30/17 1915)  Neuro  Neurologic State: Alert; Appropriate for age;Drowsy (03/30/17 1915)  Orientation Level: Oriented to person;Oriented to place;Oriented to situation (03/30/17 1915)  Cognition: Follows commands (03/30/17 1915)  LUE Motor Response: Purposeful (03/30/17 1915)  LLE Motor Response: Purposeful (03/30/17 1915)  RUE Motor Response: Purposeful (03/30/17 1915)  RLE Motor Response: Purposeful (03/30/17 1915)   At baseline    Mental Status and Level of Consciousness: Arousable    Pulmonary Status:   O2 Device: Room air (03/30/17 1950)   Adequate oxygenation and airway patent    Complications related to anesthesia: None    Post-anesthesia assessment completed.  No concerns    Signed By: Mundo Alves MD     March 30, 2017

## 2017-03-31 NOTE — OP NOTES
[address], [address]   OP NOTE       Name:  Emilee Ko   MR#:  [id number]   :  1965   Account #:  [de-identified]    Surgery Date:  2017   Date of Adm:  2017       PREOPERATIVE DIAGNOSES   1. Right breast cancer. 2. Acquired absence, right breast.   3. Persistent right breast seroma. POSTOPERATIVE DIAGNOSES   1. Right breast cancer. 2. Acquired absence, right breast.   3. Persistent right breast seroma. PROCEDURES PERFORMED:   Evacuation of right breast seroma and   drain placement. ESTIMATED BLOOD LOSS:  1 mL. SPECIMENS REMOVED:  None. ANESTHESIA:  General via LMA. SURGEON: Jung Olivas MD.     ASSISTANT: None. INDICATIONS: The patient is a 51-year-old female with a history of   right-sided breast cancer who had undergone mastectomy and   immediate reconstruction with tissue expander and AlloDerm   placement. Her drain had been removed when it was less than 30 mL   per day. However, she developed a persistent seroma. It had not   resolved despite multiple aspirations in the office. It had become   increasingly difficult to perform these serial aspirations. Operative   evacuation and placement of a drain was recommended. The procedure   as well as the alternatives, possible complications, and anticipated   scars were outlined with the patient. She agrees to proceed, having   given her informed and written consent. PROCEDURE IN DETAIL: The patient was positively identified in the   preoperative holding area and the operative site was marked. She   received preoperative intravenous antibiotics. She was taken to the   operating room and placed in a supine position with all pressure points   padded. Pneumatic compression stockings were applied to bilateral   lower extremities. Following successful general anesthesia via LMA,   the arms were secured to padded arm boards.  The right chest was prepped with ChloraPrep and draped in usual sterile fashion. A   horizontally oriented ellipse was marked in the medial aspect of the   scar. This was infiltrated with 1% lidocaine with epinephrine 1:100,000. It was planned for excision due to its redundancy. The lateral portion of   the inframammary fold scar was also infiltrated with 1% lidocaine with   epinephrine 1:100,000. Attention was first directed to the medial   portion of the breast. Skin was incised with a scalpel. It was removed   with the Bovie. A seroma cavity was encountered. Approximately 100   mL of clear seroma fluid was evacuated. Laterally, the incision was   opened with the scalpel down to the seroma cavity, which   connected to the medial opening as one cavity. This was irrigated thoroughly with   normal saline solution. Bovie was used to score the internal surface of   the seroma cavity extensively in an effort to promote adhesion to the alloderm. A 19-Vietnamese Niles drain was placed in   the dependent portion of the breast pocket laterally and secured with a   2-0 Prolene suture. Both incisions were then closed in layers with   interrupted 2-0 Vicryl sutures, followed by 3-0 Vicryl sutures in the   dermis, and 4-0 Monocryl in running subcuticular fashion. The incisions   were cleaned. Drain was hooked to bulb suction. Dermabond was   applied to the incisions. She was allowed to awaken from anesthesia. The   LMA was removed without complication. She was taken to recovery room in   excellent condition. DRAINS: Niles x1. COMPLICATIONS: None.         Angle Hanna MD      [address] [place]   D:  03/30/2017   19:16   T:  03/30/2017   23:36   Job #:  [id number]

## 2017-05-03 LAB
BASOPHILS # BLD AUTO: 0 X10E3/UL (ref 0–0.2)
BASOPHILS NFR BLD AUTO: 0 %
EOSINOPHIL # BLD AUTO: 0.1 X10E3/UL (ref 0–0.4)
EOSINOPHIL NFR BLD AUTO: 2 %
ERYTHROCYTE [DISTWIDTH] IN BLOOD BY AUTOMATED COUNT: 16.7 % (ref 12.3–15.4)
HCT VFR BLD AUTO: 39.2 % (ref 34–46.6)
HGB BLD-MCNC: 12.4 G/DL (ref 11.1–15.9)
IMM GRANULOCYTES # BLD: 0 X10E3/UL (ref 0–0.1)
IMM GRANULOCYTES NFR BLD: 0 %
LYMPHOCYTES # BLD AUTO: 1 X10E3/UL (ref 0.7–3.1)
LYMPHOCYTES NFR BLD AUTO: 38 %
MCH RBC QN AUTO: 24.4 PG (ref 26.6–33)
MCHC RBC AUTO-ENTMCNC: 31.6 G/DL (ref 31.5–35.7)
MCV RBC AUTO: 77 FL (ref 79–97)
MONOCYTES # BLD AUTO: 0.2 X10E3/UL (ref 0.1–0.9)
MONOCYTES NFR BLD AUTO: 8 %
NEUTROPHILS # BLD AUTO: 1.4 X10E3/UL (ref 1.4–7)
NEUTROPHILS NFR BLD AUTO: 52 %
PLATELET # BLD AUTO: 251 X10E3/UL (ref 150–379)
RBC # BLD AUTO: 5.08 X10E6/UL (ref 3.77–5.28)
WBC # BLD AUTO: 2.7 X10E3/UL (ref 3.4–10.8)

## 2017-05-04 NOTE — PROGRESS NOTES
She had low wbc prior to starting chemo  So may just have a benign neutropenia  It is going up/ better and other counts are better also  Just monitor next visit

## 2017-05-08 ENCOUNTER — HOSPITAL ENCOUNTER (OUTPATIENT)
Dept: CT IMAGING | Age: 52
Discharge: HOME | End: 2017-05-08
Attending: PLASTIC SURGERY
Payer: COMMERCIAL

## 2017-05-08 DIAGNOSIS — C50.911 BREAST CANCER, RIGHT BREAST (HCC): ICD-10-CM

## 2017-05-08 PROCEDURE — 74174 CTA ABD&PLVS W/CONTRAST: CPT

## 2017-05-08 PROCEDURE — 74011250636 HC RX REV CODE- 250/636: Performed by: PLASTIC SURGERY

## 2017-05-08 PROCEDURE — 74011636320 HC RX REV CODE- 636/320: Performed by: PLASTIC SURGERY

## 2017-05-08 RX ORDER — SODIUM CHLORIDE 9 MG/ML
50 INJECTION, SOLUTION INTRAVENOUS
Status: COMPLETED | OUTPATIENT
Start: 2017-05-08 | End: 2017-05-08

## 2017-05-08 RX ORDER — SODIUM CHLORIDE 0.9 % (FLUSH) 0.9 %
10 SYRINGE (ML) INJECTION
Status: COMPLETED | OUTPATIENT
Start: 2017-05-08 | End: 2017-05-08

## 2017-05-08 RX ADMIN — SODIUM CHLORIDE 50 ML/HR: 900 INJECTION, SOLUTION INTRAVENOUS at 13:56

## 2017-05-08 RX ADMIN — Medication 10 ML: at 13:56

## 2017-05-08 RX ADMIN — IOPAMIDOL 100 ML: 755 INJECTION, SOLUTION INTRAVENOUS at 13:56

## 2017-05-25 ENCOUNTER — NURSE NAVIGATOR (OUTPATIENT)
Dept: ONCOLOGY | Age: 52
End: 2017-05-25

## 2017-05-25 NOTE — PROGRESS NOTES
Manejacquelyn Henry, : 1965      I contacted the patient today regarding the letter that she has requested regarding the anxiety that she is experiencing since returning to work 2 1/2 weeks ago. She is a  and she relays that she has been in good standing with her employer until a recent event where she was confronted by her supervisor and reprimanded verbally and written documentation of this encounter. Ms. Dona Banerjee said that she was put on 30 day notice for her dismissal from her company where she has been employed for over 25 years. I have referred the patient to [facility] services today. The patient has utilized the supportive services offered through the [employer]  in the past and has benefited from counseling and massage therapy. I have encouraged her to access those services again and provided her with my contact information and the [employer]  telephone number. Patient was appreciative of my telephone call today and she will f/u with me regarding the outcome and if she needs additional supportive service guidance.       Anusha Paul, RN, BSN, [facility], N    Oncology Nurse Navigator

## 2017-05-26 ENCOUNTER — DOCUMENTATION ONLY (OUTPATIENT)
Dept: ONCOLOGY | Age: 52
End: 2017-05-26

## 2017-05-26 NOTE — PROGRESS NOTES
Pt is not on any therapy from us  Triple neg breast cancer  Now having work issues and would recommend seeing PCP for these issues

## 2017-06-28 ENCOUNTER — OFFICE VISIT (OUTPATIENT)
Dept: SURGERY | Age: 52
End: 2017-06-28

## 2017-06-28 VITALS
BODY MASS INDEX: 33.27 KG/M2 | SYSTOLIC BLOOD PRESSURE: 129 MMHG | WEIGHT: 207 LBS | HEIGHT: 66 IN | HEART RATE: 80 BPM | DIASTOLIC BLOOD PRESSURE: 93 MMHG

## 2017-06-28 DIAGNOSIS — Z90.11 S/P MASTECTOMY, RIGHT: ICD-10-CM

## 2017-06-28 DIAGNOSIS — C50.411 BREAST CANCER OF UPPER-OUTER QUADRANT OF RIGHT FEMALE BREAST (HCC): Primary | ICD-10-CM

## 2017-06-28 DIAGNOSIS — C77.3 BREAST CANCER METASTASIZED TO AXILLARY LYMPH NODE, RIGHT (HCC): ICD-10-CM

## 2017-06-28 DIAGNOSIS — Z98.890 S/P BREAST RECONSTRUCTION, RIGHT: ICD-10-CM

## 2017-06-28 DIAGNOSIS — C50.911 BREAST CANCER METASTASIZED TO AXILLARY LYMPH NODE, RIGHT (HCC): ICD-10-CM

## 2017-06-28 NOTE — PATIENT INSTRUCTIONS
Breast Self-Exam: Care Instructions  Your Care Instructions  A breast self-exam is when you check your breasts for lumps or changes. This regular exam helps you learn how your breasts normally look and feel. Most breast problems or changes are not because of cancer. Breast self-exam is not a substitute for a mammogram. Having regular breast exams by your doctor and regular mammograms improve your chances of finding any problems with your breasts. Some women set a time each month to do a step-by-step breast self-exam. Other women like a less formal system. They might look at their breasts as they brush their teeth, or feel their breasts once in a while in the shower. If you notice a change in your breast, tell your doctor. Follow-up care is a key part of your treatment and safety. Be sure to make and go to all appointments, and call your doctor if you are having problems. Its also a good idea to know your test results and keep a list of the medicines you take. How do you do a breast self-exam?  · The best time to examine your breasts is usually one week after your menstrual period begins. Your breasts should not be tender then. If you do not have periods, you might do your exam on a day of the month that is easy to remember. · To examine your breasts:  ¨ Remove all your clothes above the waist and lie down. When you are lying down, your breast tissue spreads evenly over your chest wall, which makes it easier to feel all your breast tissue. ¨ Use the pads--not the fingertips--of the 3 middle fingers of your left hand to check your right breast. Move your fingers slowly in small coin-sized circles that overlap. ¨ Use three levels of pressure to feel of all your breast tissue. Use light pressure to feel the tissue close to the skin surface. Use medium pressure to feel a little deeper. Use firm pressure to feel your tissue close to your breastbone and ribs.  Use each pressure level to feel your breast tissue before moving on to the next spot. ¨ Check your entire breast, moving up and down as if following a strip from the collarbone to the bra line, and from the armpit to the ribs. Repeat until you have covered the entire breast.  ¨ Repeat this procedure for your left breast, using the pads of the 3 middle fingers of your right hand. · To examine your breasts while in the shower:  ¨ Place one arm over your head and lightly soap your breast on that side. ¨ Using the pads of your fingers, gently move your hand over your breast (in the strip pattern described above), feeling carefully for any lumps or changes. ¨ Repeat for the other breast.  · Have your doctor inspect anything you notice to see if you need further testing. Where can you learn more? Go to http://myrna-conchis.info/. Enter P148 in the search box to learn more about \"Breast Self-Exam: Care Instructions. \"  Current as of: July 26, 2016  Content Version: 11.3  © 4008-2374 [employer]. Care instructions adapted under license by [employer] (which disclaims liability or warranty for this information). If you have questions about a medical condition or this instruction, always ask your healthcare professional. [place] [address] any warranty or liability for your use of this information.

## 2017-06-28 NOTE — PROGRESS NOTES
HISTORY OF PRESENT ILLNESS  Amaris Redd is a 46 y.o. female. HPI   ESTABLISHED patient here today for follow up RIGHT breast cancer. Denies any breast problems at this time. History of breast cancer-  RIGHT breast IDC triple negative. Completed neoadjuvant chemotherapy. Followed by Dr. Kaia Bliss. 2/23/17- RIGHT mastectomy, ALND, tissue expander placed by Dr. Antonio Barton. 0/15 + LN.  3/30/17- evacuation of RIGHT seroma by Dr. Antonio Barton. Declined radiation. Seen by. Dr. Adela Maurer.   8/9/17- scheduled for NANCY flap with balancing LEFT breast reduction with Dr. Antonio Barton. Review of Systems   All other systems reviewed and are negative. Physical Exam   Pulmonary/Chest: Left breast exhibits no inverted nipple, no mass, no nipple discharge, no skin change and no tenderness. Breasts are symmetrical.       Right breast surgically absent and expander intact. No masses. No adenopathy. Nursing note and vitals reviewed. ASSESSMENT and PLAN    ICD-10-CM ICD-9-CM    1. Breast cancer of upper-outer quadrant of right female breast ([facility] [address] C50.411 174.4    2. Breast cancer metastasized to axillary lymph node, right C50.911 174.9     C77.3 196.3    3. S/P mastectomy, right Z90.11 V45.71    4. S/P breast reconstruction, right Z98.890 V43.82      - no evidence recurrence  - due left mammo in July.  Will schedule before breast surgery in August.   -f/u 6 months

## 2017-06-29 ENCOUNTER — HOSPITAL ENCOUNTER (OUTPATIENT)
Dept: MAMMOGRAPHY | Age: 52
Discharge: HOME | End: 2017-06-29
Attending: SURGERY
Payer: COMMERCIAL

## 2017-06-29 DIAGNOSIS — Z12.31 VISIT FOR SCREENING MAMMOGRAM: ICD-10-CM

## 2017-06-29 PROCEDURE — 77067 SCR MAMMO BI INCL CAD: CPT

## 2017-08-03 ENCOUNTER — HOSPITAL ENCOUNTER (OUTPATIENT)
Dept: PREADMISSION TESTING | Age: 52
Discharge: HOME | End: 2017-08-03
Payer: COMMERCIAL

## 2017-08-03 VITALS
DIASTOLIC BLOOD PRESSURE: 91 MMHG | RESPIRATION RATE: 16 BRPM | OXYGEN SATURATION: 99 % | HEART RATE: 82 BPM | BODY MASS INDEX: 34.07 KG/M2 | SYSTOLIC BLOOD PRESSURE: 122 MMHG | TEMPERATURE: 98.3 F | HEIGHT: 66 IN | WEIGHT: 212 LBS

## 2017-08-03 LAB
ABO + RH BLD: NORMAL
BASOPHILS # BLD AUTO: 0 K/UL (ref 0–0.1)
BASOPHILS # BLD: 0 % (ref 0–1)
BLOOD GROUP ANTIBODIES SERPL: NORMAL
EOSINOPHIL # BLD: 0.1 K/UL (ref 0–0.4)
EOSINOPHIL NFR BLD: 2 % (ref 0–7)
ERYTHROCYTE [DISTWIDTH] IN BLOOD BY AUTOMATED COUNT: 16.6 % (ref 11.5–14.5)
HCT VFR BLD AUTO: 39.2 % (ref 35–47)
HGB BLD-MCNC: 12.5 G/DL (ref 11.5–16)
LYMPHOCYTES # BLD AUTO: 43 % (ref 12–49)
LYMPHOCYTES # BLD: 1.2 K/UL (ref 0.8–3.5)
MCH RBC QN AUTO: 25.7 PG (ref 26–34)
MCHC RBC AUTO-ENTMCNC: 31.9 G/DL (ref 30–36.5)
MCV RBC AUTO: 80.7 FL (ref 80–99)
MONOCYTES # BLD: 0.2 K/UL (ref 0–1)
MONOCYTES NFR BLD AUTO: 7 % (ref 5–13)
NEUTS SEG # BLD: 1.4 K/UL (ref 1.8–8)
NEUTS SEG NFR BLD AUTO: 48 % (ref 32–75)
PLATELET # BLD AUTO: 214 K/UL (ref 150–400)
RBC # BLD AUTO: 4.86 M/UL (ref 3.8–5.2)
SPECIMEN EXP DATE BLD: NORMAL
WBC # BLD AUTO: 2.8 K/UL (ref 3.6–11)

## 2017-08-03 PROCEDURE — 86900 BLOOD TYPING SEROLOGIC ABO: CPT | Performed by: PLASTIC SURGERY

## 2017-08-03 PROCEDURE — 85025 COMPLETE CBC W/AUTO DIFF WBC: CPT | Performed by: ANESTHESIOLOGY

## 2017-08-03 PROCEDURE — 36415 COLL VENOUS BLD VENIPUNCTURE: CPT | Performed by: ANESTHESIOLOGY

## 2017-08-03 NOTE — PERIOP NOTES
[facility]  PREOPERATIVE INSTRUCTIONS    Surgery Date:   8/9/17      Surgery arrival time given by surgeon: NO  (If [facility] staff will call you between 4pm - 8pm the day before surgery with your arrival time. If your surgery is on a Monday, we will call you the preceding Friday. Please call [phone] after 7pm if you did not receive your arrival time.)  1. Report  to the 2nd Floor Admitting Desk at the time you were told the night before surgery. Bring your insurance card, photo identification, and any copayment (if applicable). 2. You must have a responsible adult to drive you home and stay with you the first 24 hours after surgery if you are going home the same day of your surgery. 3. Nothing to eat or drink after midnight the night before surgery. This means NO water, gum, mints, coffee, juice, etc.    4. MEDICATIONS TO TAKE THE MORNING OF SURGERY WITH A SIP OF WATER:none  5. No alcoholic beverages 24 hours before and after your surgery. 6. If you are being admitted to the hospital,please leave personal belongings/luggage in your car until you have an assigned hospital room number. ( The hospital discharge time is 12 PM NOON. Your adult  should be at the hospital prior to the noon discharge time unless otherwise instructed.)   7. STOP Aspirin and/or any non-steroidal anti-inflammatory drugs (i.e. Ibuprofen, Naproxen, Advil, Aleve) as directed by your surgeon. You may take Tylenol. Stop herbal supplements 1 week prior to  surgery. 8. If you are currently taking Plavix, Coumadin,or any other blood-thinning/ anticoagulant medication contact your surgeon for instructions. 9. Wear comfortable clothes. Wear your glasses instead of contacts. Please leave all money, jewelry and valuables at home. No make up, particularly mascara, the day of surgery. 10.  REMOVE ALL body piercings, rings,and jewelry and leave at home. Wear your hair loose or down, no pony-tails, buns, or any metal hair clips.    11. If you shower the morning of surgery, please do not apply any lotions, powders, or deodorants afterwards. Do not shave any body area within 24 hours of your surgery. 12. Please follow all instructions to avoid any potential surgical cancellation. 13. Should your physical condition change, (i.e. fever, cold, flu, etc.) please notify your surgeon as soon as possible. 14. It is important to be on time. If a situation occurs where you may be delayed, please call:  [phone]  on the day of surgery. 15. The [facility] staff can be reached at [phone]. 16. Special instructions: free  parking  17. The patient was contacted  in person. She  verbalize  understanding of all instructions does not  need reinforcement.

## 2017-08-08 ENCOUNTER — ANESTHESIA EVENT (OUTPATIENT)
Dept: SURGERY | Age: 52
DRG: 585 | End: 2017-08-08
Payer: COMMERCIAL

## 2017-08-09 ENCOUNTER — ANESTHESIA (OUTPATIENT)
Dept: SURGERY | Age: 52
DRG: 585 | End: 2017-08-09
Payer: COMMERCIAL

## 2017-08-09 ENCOUNTER — HOSPITAL ENCOUNTER (INPATIENT)
Age: 52
LOS: 3 days | Discharge: HOME | DRG: 585 | End: 2017-08-12
Attending: PLASTIC SURGERY | Admitting: PLASTIC SURGERY
Payer: COMMERCIAL

## 2017-08-09 PROCEDURE — 77030027413 HC ADH SKN AESC -B: Performed by: PLASTIC SURGERY

## 2017-08-09 PROCEDURE — 74011000258 HC RX REV CODE- 258: Performed by: PLASTIC SURGERY

## 2017-08-09 PROCEDURE — 74011250636 HC RX REV CODE- 250/636

## 2017-08-09 PROCEDURE — 77030002991 HC SUT QUILL SSPC -B: Performed by: PLASTIC SURGERY

## 2017-08-09 PROCEDURE — 0HPU0NZ REMOVAL OF TISSUE EXPANDER FROM LEFT BREAST, OPEN APPROACH: ICD-10-PCS | Performed by: PLASTIC SURGERY

## 2017-08-09 PROCEDURE — 76210000016 HC OR PH I REC 1 TO 1.5 HR: Performed by: PLASTIC SURGERY

## 2017-08-09 PROCEDURE — 77030032490 HC SLV COMPR SCD KNE COVD -B: Performed by: PLASTIC SURGERY

## 2017-08-09 PROCEDURE — 77030002933 HC SUT MCRYL J&J -A: Performed by: PLASTIC SURGERY

## 2017-08-09 PROCEDURE — 77030013079 HC BLNKT BAIR HGGR 3M -A: Performed by: ANESTHESIOLOGY

## 2017-08-09 PROCEDURE — 77030014336 HC CLP LIG TI SYNO -B: Performed by: PLASTIC SURGERY

## 2017-08-09 PROCEDURE — 77030026438 HC STYL ET INTUB CARD -A: Performed by: ANESTHESIOLOGY

## 2017-08-09 PROCEDURE — 77030008771 HC TU NG SALEM SUMP -A: Performed by: ANESTHESIOLOGY

## 2017-08-09 PROCEDURE — 74011000250 HC RX REV CODE- 250

## 2017-08-09 PROCEDURE — 77030003029 HC SUT VCRL J&J -B: Performed by: PLASTIC SURGERY

## 2017-08-09 PROCEDURE — 77030008459 HC STPLR SKN COOP -B: Performed by: PLASTIC SURGERY

## 2017-08-09 PROCEDURE — 0HRT077 REPLACEMENT OF RIGHT BREAST USING DEEP INFERIOR EPIGASTRIC ARTERY PERFORATOR FLAP, OPEN APPROACH: ICD-10-PCS | Performed by: PLASTIC SURGERY

## 2017-08-09 PROCEDURE — 77030030275 HC CLMP VSL DBL DISP SYNO -F: Performed by: PLASTIC SURGERY

## 2017-08-09 PROCEDURE — C9290 INJ, BUPIVACAINE LIPOSOME: HCPCS | Performed by: PLASTIC SURGERY

## 2017-08-09 PROCEDURE — 74011250636 HC RX REV CODE- 250/636: Performed by: PLASTIC SURGERY

## 2017-08-09 PROCEDURE — 77030025876 HC CLMP VSL SGL DISP SYNO -D: Performed by: PLASTIC SURGERY

## 2017-08-09 PROCEDURE — 77030010512 HC APPL CLP LIG J&J -C: Performed by: PLASTIC SURGERY

## 2017-08-09 PROCEDURE — 77030034850: Performed by: PLASTIC SURGERY

## 2017-08-09 PROCEDURE — 77030010514 HC APPL CLP LIG COVD -B: Performed by: PLASTIC SURGERY

## 2017-08-09 PROCEDURE — 0H0U0ZZ ALTERATION OF LEFT BREAST, OPEN APPROACH: ICD-10-PCS | Performed by: PLASTIC SURGERY

## 2017-08-09 PROCEDURE — 77030037651: Performed by: PLASTIC SURGERY

## 2017-08-09 PROCEDURE — 77030008467 HC STPLR SKN COVD -B: Performed by: PLASTIC SURGERY

## 2017-08-09 PROCEDURE — 77030008463 HC STPLR SKN PROX J&J -B: Performed by: PLASTIC SURGERY

## 2017-08-09 PROCEDURE — 77030008684 HC TU ET CUF COVD -B: Performed by: ANESTHESIOLOGY

## 2017-08-09 PROCEDURE — 74011250637 HC RX REV CODE- 250/637: Performed by: PLASTIC SURGERY

## 2017-08-09 PROCEDURE — 77030025874 HC CLP HMSTAT MIC SUPFN TI SYNO -B: Performed by: PLASTIC SURGERY

## 2017-08-09 PROCEDURE — 77030029194 HC CPLR ANAST MICVAS DEV SYNO -D: Performed by: PLASTIC SURGERY

## 2017-08-09 PROCEDURE — 88305 TISSUE EXAM BY PATHOLOGIST: CPT | Performed by: PLASTIC SURGERY

## 2017-08-09 PROCEDURE — 88304 TISSUE EXAM BY PATHOLOGIST: CPT | Performed by: PLASTIC SURGERY

## 2017-08-09 PROCEDURE — 77030029262 HC BKGRN VISBLTY MAT SYNO -B: Performed by: PLASTIC SURGERY

## 2017-08-09 PROCEDURE — 76010000178 HC OR TIME 5.5 TO 6 HR INTENSV-TIER 1: Performed by: PLASTIC SURGERY

## 2017-08-09 PROCEDURE — 74011250636 HC RX REV CODE- 250/636: Performed by: ANESTHESIOLOGY

## 2017-08-09 PROCEDURE — 77030013474 HC CRD BPLR DISP ADLR -A: Performed by: PLASTIC SURGERY

## 2017-08-09 PROCEDURE — 77030002966 HC SUT PDS J&J -A: Performed by: PLASTIC SURGERY

## 2017-08-09 PROCEDURE — 76060000042 HC ANESTHESIA 5.5 TO 6 HR: Performed by: PLASTIC SURGERY

## 2017-08-09 PROCEDURE — 65610000006 HC RM INTENSIVE CARE

## 2017-08-09 PROCEDURE — 77030016570 HC BLNKT BAIR HGGR 3M -B: Performed by: PLASTIC SURGERY

## 2017-08-09 PROCEDURE — 77030002996 HC SUT SLK J&J -A: Performed by: PLASTIC SURGERY

## 2017-08-09 PROCEDURE — 77030013705 HC HK ELAS STAY COOP -B: Performed by: PLASTIC SURGERY

## 2017-08-09 PROCEDURE — 77030020782 HC GWN BAIR PAWS FLX 3M -B

## 2017-08-09 PROCEDURE — 77030031139 HC SUT VCRL2 J&J -A: Performed by: PLASTIC SURGERY

## 2017-08-09 DEVICE — THE GEM MICROVASCULAR ANASTOMOTIC COUPLER DEVICE RINGS ARE MADE OF POLYETHYLENE AND SURGICAL GRADE STAINLESS STEEL PINS. A PROTECTIVE COVER AND JAW ASSEMBLY PROTECT THE RINGS AND ALLOW FOR EASY LOADING ONTO THE ANASTOMOTIC INSTRUMENT. BOTH THE PROTECTIVE COVER AND JAW ASSEMBLY ARE DISPOSABLE. THE GEM MICROVASCULAR ANASTOMOTIC COUPLER DEVICE IS SINGLE-USE AND AVAILABLE IN VARIOUS SIZES
Type: IMPLANTABLE DEVICE | Site: BREAST | Status: FUNCTIONAL
Brand: GEM MICROVASCULAR ANASTOMOTIC COUPLER

## 2017-08-09 RX ORDER — HEPARIN SODIUM 5000 [USP'U]/ML
5000 INJECTION, SOLUTION INTRAVENOUS; SUBCUTANEOUS EVERY 8 HOURS
Status: DISCONTINUED | OUTPATIENT
Start: 2017-08-09 | End: 2017-08-12 | Stop reason: HOSPADM

## 2017-08-09 RX ORDER — HEPARIN SODIUM 5000 [USP'U]/ML
INJECTION, SOLUTION INTRAVENOUS; SUBCUTANEOUS AS NEEDED
Status: DISCONTINUED | OUTPATIENT
Start: 2017-08-09 | End: 2017-08-09 | Stop reason: HOSPADM

## 2017-08-09 RX ORDER — LIDOCAINE HYDROCHLORIDE 10 MG/ML
0.1 INJECTION, SOLUTION EPIDURAL; INFILTRATION; INTRACAUDAL; PERINEURAL AS NEEDED
Status: DISCONTINUED | OUTPATIENT
Start: 2017-08-09 | End: 2017-08-09 | Stop reason: HOSPADM

## 2017-08-09 RX ORDER — NEOSTIGMINE METHYLSULFATE 1 MG/ML
INJECTION INTRAVENOUS AS NEEDED
Status: DISCONTINUED | OUTPATIENT
Start: 2017-08-09 | End: 2017-08-09

## 2017-08-09 RX ORDER — PAPAVERINE HYDROCHLORIDE 30 MG/ML
INJECTION INTRAMUSCULAR; INTRAVENOUS AS NEEDED
Status: DISCONTINUED | OUTPATIENT
Start: 2017-08-09 | End: 2017-08-09 | Stop reason: HOSPADM

## 2017-08-09 RX ORDER — ONDANSETRON 2 MG/ML
4 INJECTION INTRAMUSCULAR; INTRAVENOUS AS NEEDED
Status: DISCONTINUED | OUTPATIENT
Start: 2017-08-09 | End: 2017-08-09 | Stop reason: HOSPADM

## 2017-08-09 RX ORDER — ROCURONIUM BROMIDE 10 MG/ML
INJECTION, SOLUTION INTRAVENOUS AS NEEDED
Status: DISCONTINUED | OUTPATIENT
Start: 2017-08-09 | End: 2017-08-09 | Stop reason: HOSPADM

## 2017-08-09 RX ORDER — MIDAZOLAM HYDROCHLORIDE 1 MG/ML
INJECTION, SOLUTION INTRAMUSCULAR; INTRAVENOUS AS NEEDED
Status: DISCONTINUED | OUTPATIENT
Start: 2017-08-09 | End: 2017-08-09 | Stop reason: HOSPADM

## 2017-08-09 RX ORDER — SODIUM CHLORIDE 0.9 % (FLUSH) 0.9 %
5-10 SYRINGE (ML) INJECTION AS NEEDED
Status: DISCONTINUED | OUTPATIENT
Start: 2017-08-09 | End: 2017-08-09 | Stop reason: HOSPADM

## 2017-08-09 RX ORDER — LIDOCAINE HYDROCHLORIDE 20 MG/ML
INJECTION, SOLUTION EPIDURAL; INFILTRATION; INTRACAUDAL; PERINEURAL AS NEEDED
Status: DISCONTINUED | OUTPATIENT
Start: 2017-08-09 | End: 2017-08-09 | Stop reason: HOSPADM

## 2017-08-09 RX ORDER — PROPOFOL 10 MG/ML
INJECTION, EMULSION INTRAVENOUS AS NEEDED
Status: DISCONTINUED | OUTPATIENT
Start: 2017-08-09 | End: 2017-08-09 | Stop reason: HOSPADM

## 2017-08-09 RX ORDER — CEFAZOLIN SODIUM IN 0.9 % NACL 2 G/50 ML
2 INTRAVENOUS SOLUTION, PIGGYBACK (ML) INTRAVENOUS ONCE
Status: DISCONTINUED | OUTPATIENT
Start: 2017-08-09 | End: 2017-08-09 | Stop reason: HOSPADM

## 2017-08-09 RX ORDER — DEXAMETHASONE SODIUM PHOSPHATE 4 MG/ML
INJECTION, SOLUTION INTRA-ARTICULAR; INTRALESIONAL; INTRAMUSCULAR; INTRAVENOUS; SOFT TISSUE AS NEEDED
Status: DISCONTINUED | OUTPATIENT
Start: 2017-08-09 | End: 2017-08-09 | Stop reason: HOSPADM

## 2017-08-09 RX ORDER — FENTANYL CITRATE 50 UG/ML
INJECTION, SOLUTION INTRAMUSCULAR; INTRAVENOUS AS NEEDED
Status: DISCONTINUED | OUTPATIENT
Start: 2017-08-09 | End: 2017-08-09 | Stop reason: HOSPADM

## 2017-08-09 RX ORDER — MORPHINE SULFATE 5 MG/ML
INJECTION, SOLUTION INTRAVENOUS CONTINUOUS
Status: DISCONTINUED | OUTPATIENT
Start: 2017-08-09 | End: 2017-08-10

## 2017-08-09 RX ORDER — SUCCINYLCHOLINE CHLORIDE 20 MG/ML
INJECTION INTRAMUSCULAR; INTRAVENOUS AS NEEDED
Status: DISCONTINUED | OUTPATIENT
Start: 2017-08-09 | End: 2017-08-09 | Stop reason: HOSPADM

## 2017-08-09 RX ORDER — DIPHENHYDRAMINE HYDROCHLORIDE 50 MG/ML
12.5 INJECTION, SOLUTION INTRAMUSCULAR; INTRAVENOUS AS NEEDED
Status: DISCONTINUED | OUTPATIENT
Start: 2017-08-09 | End: 2017-08-09 | Stop reason: HOSPADM

## 2017-08-09 RX ORDER — SODIUM CHLORIDE, SODIUM LACTATE, POTASSIUM CHLORIDE, CALCIUM CHLORIDE 600; 310; 30; 20 MG/100ML; MG/100ML; MG/100ML; MG/100ML
125 INJECTION, SOLUTION INTRAVENOUS CONTINUOUS
Status: DISCONTINUED | OUTPATIENT
Start: 2017-08-09 | End: 2017-08-10

## 2017-08-09 RX ORDER — SODIUM CHLORIDE, SODIUM LACTATE, POTASSIUM CHLORIDE, CALCIUM CHLORIDE 600; 310; 30; 20 MG/100ML; MG/100ML; MG/100ML; MG/100ML
100 INJECTION, SOLUTION INTRAVENOUS CONTINUOUS
Status: DISCONTINUED | OUTPATIENT
Start: 2017-08-09 | End: 2017-08-09 | Stop reason: HOSPADM

## 2017-08-09 RX ORDER — DOCUSATE SODIUM 100 MG/1
100 CAPSULE, LIQUID FILLED ORAL DAILY
Status: DISCONTINUED | OUTPATIENT
Start: 2017-08-10 | End: 2017-08-12 | Stop reason: HOSPADM

## 2017-08-09 RX ORDER — SODIUM CHLORIDE, SODIUM LACTATE, POTASSIUM CHLORIDE, CALCIUM CHLORIDE 600; 310; 30; 20 MG/100ML; MG/100ML; MG/100ML; MG/100ML
125 INJECTION, SOLUTION INTRAVENOUS CONTINUOUS
Status: DISCONTINUED | OUTPATIENT
Start: 2017-08-09 | End: 2017-08-09 | Stop reason: HOSPADM

## 2017-08-09 RX ORDER — SODIUM CHLORIDE 0.9 % (FLUSH) 0.9 %
5-10 SYRINGE (ML) INJECTION EVERY 8 HOURS
Status: DISCONTINUED | OUTPATIENT
Start: 2017-08-09 | End: 2017-08-09 | Stop reason: HOSPADM

## 2017-08-09 RX ORDER — CEFAZOLIN SODIUM IN 0.9 % NACL 2 G/50 ML
2 INTRAVENOUS SOLUTION, PIGGYBACK (ML) INTRAVENOUS EVERY 8 HOURS
Status: COMPLETED | OUTPATIENT
Start: 2017-08-09 | End: 2017-08-11

## 2017-08-09 RX ORDER — HYDROMORPHONE HYDROCHLORIDE 2 MG/ML
INJECTION, SOLUTION INTRAMUSCULAR; INTRAVENOUS; SUBCUTANEOUS AS NEEDED
Status: DISCONTINUED | OUTPATIENT
Start: 2017-08-09 | End: 2017-08-09 | Stop reason: HOSPADM

## 2017-08-09 RX ORDER — ONDANSETRON 2 MG/ML
4 INJECTION INTRAMUSCULAR; INTRAVENOUS
Status: DISCONTINUED | OUTPATIENT
Start: 2017-08-09 | End: 2017-08-12 | Stop reason: HOSPADM

## 2017-08-09 RX ORDER — ACETAMINOPHEN 325 MG/1
650 TABLET ORAL
Status: DISCONTINUED | OUTPATIENT
Start: 2017-08-09 | End: 2017-08-12 | Stop reason: HOSPADM

## 2017-08-09 RX ORDER — ONDANSETRON 2 MG/ML
INJECTION INTRAMUSCULAR; INTRAVENOUS AS NEEDED
Status: DISCONTINUED | OUTPATIENT
Start: 2017-08-09 | End: 2017-08-09 | Stop reason: HOSPADM

## 2017-08-09 RX ORDER — CEFAZOLIN SODIUM 1 G/3ML
INJECTION, POWDER, FOR SOLUTION INTRAMUSCULAR; INTRAVENOUS AS NEEDED
Status: DISCONTINUED | OUTPATIENT
Start: 2017-08-09 | End: 2017-08-09 | Stop reason: HOSPADM

## 2017-08-09 RX ORDER — HYDROMORPHONE HYDROCHLORIDE 1 MG/ML
.25-1 INJECTION, SOLUTION INTRAMUSCULAR; INTRAVENOUS; SUBCUTANEOUS
Status: DISCONTINUED | OUTPATIENT
Start: 2017-08-09 | End: 2017-08-09 | Stop reason: HOSPADM

## 2017-08-09 RX ADMIN — SODIUM CHLORIDE, SODIUM LACTATE, POTASSIUM CHLORIDE, AND CALCIUM CHLORIDE 125 ML/HR: 600; 310; 30; 20 INJECTION, SOLUTION INTRAVENOUS at 13:47

## 2017-08-09 RX ADMIN — DEXAMETHASONE SODIUM PHOSPHATE 8 MG: 4 INJECTION, SOLUTION INTRA-ARTICULAR; INTRALESIONAL; INTRAMUSCULAR; INTRAVENOUS; SOFT TISSUE at 08:06

## 2017-08-09 RX ADMIN — FENTANYL CITRATE 150 MCG: 50 INJECTION, SOLUTION INTRAMUSCULAR; INTRAVENOUS at 07:56

## 2017-08-09 RX ADMIN — SUCCINYLCHOLINE CHLORIDE 100 MG: 20 INJECTION INTRAMUSCULAR; INTRAVENOUS at 07:56

## 2017-08-09 RX ADMIN — ONDANSETRON 4 MG: 2 INJECTION INTRAMUSCULAR; INTRAVENOUS at 13:12

## 2017-08-09 RX ADMIN — HYDROMORPHONE HYDROCHLORIDE 1 MG: 2 INJECTION, SOLUTION INTRAMUSCULAR; INTRAVENOUS; SUBCUTANEOUS at 08:57

## 2017-08-09 RX ADMIN — ONDANSETRON 4 MG: 2 INJECTION INTRAMUSCULAR; INTRAVENOUS at 21:06

## 2017-08-09 RX ADMIN — SODIUM CHLORIDE, SODIUM LACTATE, POTASSIUM CHLORIDE, AND CALCIUM CHLORIDE 125 ML/HR: 600; 310; 30; 20 INJECTION, SOLUTION INTRAVENOUS at 23:02

## 2017-08-09 RX ADMIN — FENTANYL CITRATE 50 MCG: 50 INJECTION, SOLUTION INTRAMUSCULAR; INTRAVENOUS at 08:05

## 2017-08-09 RX ADMIN — ROCURONIUM BROMIDE 10 MG: 10 INJECTION, SOLUTION INTRAVENOUS at 08:24

## 2017-08-09 RX ADMIN — SODIUM CHLORIDE, POTASSIUM CHLORIDE, SODIUM LACTATE AND CALCIUM CHLORIDE: 600; 310; 30; 20 INJECTION, SOLUTION INTRAVENOUS at 09:15

## 2017-08-09 RX ADMIN — HYDROMORPHONE HYDROCHLORIDE 0.25 MG: 2 INJECTION, SOLUTION INTRAMUSCULAR; INTRAVENOUS; SUBCUTANEOUS at 11:59

## 2017-08-09 RX ADMIN — CEFAZOLIN 2 G: 1 INJECTION, POWDER, FOR SOLUTION INTRAMUSCULAR; INTRAVENOUS; PARENTERAL at 14:48

## 2017-08-09 RX ADMIN — CEFAZOLIN 2 G: 1 INJECTION, POWDER, FOR SOLUTION INTRAMUSCULAR; INTRAVENOUS; PARENTERAL at 21:02

## 2017-08-09 RX ADMIN — PROPOFOL 150 MG: 10 INJECTION, EMULSION INTRAVENOUS at 07:56

## 2017-08-09 RX ADMIN — ROCURONIUM BROMIDE 35 MG: 10 INJECTION, SOLUTION INTRAVENOUS at 08:02

## 2017-08-09 RX ADMIN — CEFAZOLIN SODIUM 2 G: 1 INJECTION, POWDER, FOR SOLUTION INTRAMUSCULAR; INTRAVENOUS at 08:00

## 2017-08-09 RX ADMIN — HEPARIN SODIUM 5000 UNITS: 5000 INJECTION, SOLUTION INTRAVENOUS; SUBCUTANEOUS at 10:01

## 2017-08-09 RX ADMIN — LIDOCAINE HYDROCHLORIDE 80 MG: 20 INJECTION, SOLUTION EPIDURAL; INFILTRATION; INTRACAUDAL; PERINEURAL at 07:56

## 2017-08-09 RX ADMIN — SODIUM CHLORIDE, POTASSIUM CHLORIDE, SODIUM LACTATE AND CALCIUM CHLORIDE: 600; 310; 30; 20 INJECTION, SOLUTION INTRAVENOUS at 08:22

## 2017-08-09 RX ADMIN — HEPARIN SODIUM 5000 UNITS: 5000 INJECTION, SOLUTION INTRAVENOUS; SUBCUTANEOUS at 17:18

## 2017-08-09 RX ADMIN — MIDAZOLAM HYDROCHLORIDE 1 MG: 1 INJECTION, SOLUTION INTRAMUSCULAR; INTRAVENOUS at 07:46

## 2017-08-09 RX ADMIN — FENTANYL CITRATE 50 MCG: 50 INJECTION, SOLUTION INTRAMUSCULAR; INTRAVENOUS at 08:00

## 2017-08-09 RX ADMIN — HYDROMORPHONE HYDROCHLORIDE 0.25 MG: 2 INJECTION, SOLUTION INTRAMUSCULAR; INTRAVENOUS; SUBCUTANEOUS at 11:36

## 2017-08-09 RX ADMIN — SODIUM CHLORIDE, POTASSIUM CHLORIDE, SODIUM LACTATE AND CALCIUM CHLORIDE: 600; 310; 30; 20 INJECTION, SOLUTION INTRAVENOUS at 11:20

## 2017-08-09 RX ADMIN — ROCURONIUM BROMIDE 5 MG: 10 INJECTION, SOLUTION INTRAVENOUS at 07:56

## 2017-08-09 RX ADMIN — ROCURONIUM BROMIDE 30 MG: 10 INJECTION, SOLUTION INTRAVENOUS at 09:15

## 2017-08-09 RX ADMIN — MIDAZOLAM HYDROCHLORIDE 4 MG: 1 INJECTION, SOLUTION INTRAMUSCULAR; INTRAVENOUS at 07:43

## 2017-08-09 RX ADMIN — HYDROMORPHONE HYDROCHLORIDE 0.5 MG: 1 INJECTION, SOLUTION INTRAMUSCULAR; INTRAVENOUS; SUBCUTANEOUS at 13:30

## 2017-08-09 RX ADMIN — HYDROMORPHONE HYDROCHLORIDE 0.25 MG: 2 INJECTION, SOLUTION INTRAMUSCULAR; INTRAVENOUS; SUBCUTANEOUS at 11:45

## 2017-08-09 RX ADMIN — Medication: at 13:44

## 2017-08-09 NOTE — ANESTHESIA POSTPROCEDURE EVALUATION
Post-Anesthesia Evaluation and Assessment    Patient: Olegario Martinez MRN: [id number]  SSN: [id number]    YOB: 1965  Age: 46 y.o. Sex: female       Cardiovascular Function/Vital Signs  Visit Vitals    /86    Pulse 92    Temp 36.9 °C (98.5 °F)    Resp 10    Ht 5' 6\" (1.676 m)    Wt 96.1 kg (211 lb 13.8 oz)    SpO2 97%    BMI 34.2 kg/m2       Patient is status post general anesthesia for Procedure(s):  REMOVE RIGHT BREAST TISSUE EXPANDERS / CAPSULECTOMY / NANCY FLAP BALANCING LEFT BREAST REDUCTION . Nausea/Vomiting: None    Postoperative hydration reviewed and adequate. Pain:  Pain Scale 1: Adult Nonverbal Pain Scale (08/09/17 1343)  Pain Intensity 1: 6 (08/09/17 1343)   Managed    Neurological Status:   Neuro (WDL): Within Defined Limits (08/09/17 1331)  Neuro  LUE Motor Response: Purposeful (08/09/17 1331)  LLE Motor Response: Purposeful (08/09/17 1331)  RUE Motor Response: Purposeful (08/09/17 1331)  RLE Motor Response: Purposeful (08/09/17 1331)   At baseline    Mental Status and Level of Consciousness: Arousable    Pulmonary Status:   O2 Device: Nasal cannula (08/09/17 1331)   Adequate oxygenation and airway patent    Complications related to anesthesia: None    Post-anesthesia assessment completed.  No concerns    Signed By: Tyrese Bartholomew MD     August 9, 2017

## 2017-08-09 NOTE — OP NOTES
Name: Fred Arevalo  Surgeon: Johan Odom MD   Account #: [de-identified] Surgery Date: 2017  : 1965  Age: 46 y.o. Location: [facility]    OPERATIVE REPORT     PREOPERATIVE DIAGNOSES:   1. Breast cancer. 2. Acquired absence of right breast.  3. Disproportion of reconstructed breast.    POSTOPERATIVE DIAGNOSES:   1. Breast cancer. 2. Acquired absence of right breast.  3. Disproportion of reconstructed breast.    OPERATIVE PROCEDURE:   1. Excision of right mastectomy scar, 19 cm.   2.  Removal of intact right breast tissue expander. 3.  Right breast capsulectomy. 4.  Extrapleural rib resection on right side. 5.  Delayed right breast reconstruction with NANCY flap. 6.  Balancing left breast reduction. SURGEON:  Jung Arambula MD.    ASSISTANT SURGEON:  Angie Romero MD.     ANESTHESIA: General endotracheal.     INDICATIONS: The patient is a 46 y.o. female who was previously diagnosed with breast cancer. She had tissue expander reconstruction. The patient declined radiation therapy on the side. She was a candidate for autologous tissue or permanent implant breast reconstruction, and she wished to proceed with the former. Microsurgical  flap reconstruction was recommended. The left breast had macromastia, relative to the right, and a balancing breast reduction was recommended for that side. These procedures, as well as the alternatives, possible complications, and anticipated scars were outlined with the patient and she agrees to proceed having given her informed and written consent. CT angiography was performed pre-operatively to map out the courses of the perforating blood vessels to the flap tissue of the lower abdomen and to guide intraoperative dissection. PROCEDURE IN DETAIL: Please note that Dr. Cain Harvey is an assistant surgeon for this operation due to the length and complexity of the microsurgical case.   An assistant surgeon is required for the operation because there is no other assistant, fellow, or resident otherwise available. The complexity of the  flaps involves dissection of the perforating blood vessels through the fibers of the rectus abdominis muscle during flap harvest and the extra time required for this, which is significantly more technically difficult than a standard free TRAM flap. The -22 modifier is also used due to this increased complexity and time requirement. Dr. Karen Dee was assigned preparation of the right chest.  Dr. Landry Ceron was assigned harvest of the NANCY flap. Both worked independently and concomitantly during these portions of the case. They then assisted each other under the microscope during the microsurgical portion of the procedure, then closed their respective sites. The patient was marked in the upright position in the holding area. The superior aspect of the new areolar position was marked at 22 cm in the breast meridian. A wise pattern breast reduction was marked for the left with 7 cm wise pattern limbs. She received preoperative intravenous antibiotics. She was taken to the operating room and placed in the supine position with all pressure points padded. Pneumatic compression stockings were applied to bilateral lower extremities. Following successful general endotracheal anesthesia, a Verduzco catheter was placed. The arms were tucked to her sides with appropriate padding. The chest and abdomen were prepped and draped in the usual sterile fashion. Dr. Karen Dee began in the right chest with preparation of the recipient vessels, specifically the   internal mammary vessels. The 19 cm inverted \"T,\" wise pattern mastectomy scar was excised and sent for permanent section. The capsule was entered with the bovie and an intact prepectoral tissue expander removed. The majority of the breast capsule was removed with the bovie and sent for permanent section. The breast pocket was irrigated and hemostasis assured. A segment of muscle was removed over the third rib. The third costal cartilage was resected in extrapleural fashion to expose the internal mammary vessels. Under loupe magnification, they were dissected circumferentially, dividing branches between hemostatic clips. The vessels were bathed in papaverine and covered with sailne-moistened gauze until ready for use. At the same time, Dr. Maria Del Carmen Joyce harvested the NANCY flap from the right side of the abdomen. The upper abdominal incision was made with a scalpel. Dissection continued with the Bovie down to the abdominal wall fascia. Next, dissection continued in a cephalad direction to elevate the upper abdominal flap, in order to facilitate closure. The umbilicus was circumscribed. The lower abdominal incision was then made with a scalpel. Under loupe magnification, the superficial inferior epigastric vein was identified. It was diminutive. Therefore it was divided between clips. Dissection in the lower incision continued down to the abdominal wall with the Bovie. Next, starting laterally, the NANCY flap was elevated at the fascial level, based on a single large, lateral row  with a bounding palpable pulse on the right side of the abdomen. Next, the anterior fascia was split and reflected to expose the rectus abdominis muscle. The course of the  was traced intramuscularly through the fibers of the rectus abdominis, to the source vessel on the posterior aspect of the muscle, dividing branches between hemostatic clips. The vascular pedicle was then dissected down into the pelvis at its origin off the iliac vessels. The vascular pedicle vessels were bathed in papaverine. Next, the NANCY flap was harvested by ligating the inferior epigastric vessels individually  with hemostatic clips. It was brought to the right chest and placed in the pocket. The skin island was marked. The remainder was de-epithelialized with scissors.   The flap was positioned and the operating microscope brought in. The flap vessels were prepared under the microscope and irrigated with heparin saline solution. The venous anastomosis was performed first.  The internal mammary vein was clipped inferiorly and clamped superiorly. It was divided, prepared, and irrigated. Next an end-to-end anastomosis was performed with a 3.5 mm venous  in antegrade fashion. The clamp was taken down and excellent backfill noted. Next the arterial anastomosis was performed. The internal mammary artery was clipped inferiorly and clamped superiorly. It was divided, prepared, and irrigated. Then the anastomosis was performed with interrupted #9-0 nylon sutures in hand sewn fashion. The clamps were taken down and excellent perfusion of the flap noted. Exparel had been expanded with injectable saline to 40 mL total.  10 mL was infiltrated laterally in the breast pocket along the intercostal nerves. A 19 Fr. Donalee Amas drain was placed in the breast pocket, brought out through a #15 blade stab incision in the lateral inframammary fold and secured with a #2-0 nylon suture. The flap was anchored along the superomedial border to the chest wall with #2-0 vicryl suture. The NANCY flap was then inset with buried #3-0 monocryl sutures, followed by #3-0 monoderm barbed suture in running subcuticular fashion. Attention was directed to the balancing breast reduction on the left. A 42 mm cookie cutter was used to bobby the new areolar diameter. The wise pattern markings were infiltrated with 1% lidocaine with epinephrine 1:100,000, sparing the superomedial pedicle. A penrose drain was placed as a breast tourniquet. The superomedial pedicle was sharply de-epithelialized with a #10 blade scalpel. The tourniquet was removed. The inframammary fold incision was made with the scalpel.   Dissection continued with the bovie down to the chest wall, then in a cephalad direction, to elevate the lower portion of the breast off the chest wall. The remainder of the incisions were made, and the left breast tissue was removed. The wounds were temporarily closed with penetrating towel clips. The patient was brought to an upright position, and additional breast tissue was removed as necessary until good size symmetry was achieved relative to the right breast.  She was returned to supine. Towel clips were removed. A thorough inspection was performed and hemostasis assured. 10 mL of the exparel mixture was infiltrated in the deep tissues. The wounds were approximated with buried, interrupted #2-0 vicryl sutures. A 19 Leane Cotta drain was placed, brought out through the lateral end of the inframammary fold incision, and secured with a #2-0 nylon suture. The areolar complex was rotated into position and inset with buried interrupted #3-0 vicryl sutures. Skin closure was then completed with #3-0 monoderm barbed suture in running subcuticular fashion around the areola and along the vertical scar. The inframammary fold incision was closed with #2-0 monoderm barbed suture in running subcuticular fashion. The nipple areolar complex exhibited excellent viability and appropriate capillary refill throughout the duration of the procedure. The total weight of resected tissue from the left breast was 774 g. The remainder of abdominal tissue from the contralateral, unused side was removed with the bovie, dividing perforators between hemostatic clips. The abdomen was then closed after placing the patient in semi-Reyes position. No fascia nor muscle had been removed from the abdomen. The anterior rectus sheath was closed primarily on the right side with looped #1 PDS suture in running whipstitch fashion. No mesh was used. Two 19 Fr. Niles drains were placed in the abdomen and brought out through separate inferolateral #15 blade stab incisions and secured with #2-0 nylon sutures.  The remainder of the Exparel mixture was infiltrated in the transversus abdominis plane equally on each side, injecting with a blunt needle within the lumbar triangle of Petit bilaterally. Kiran's fascia was then closed with interrupted #2-0 vicryl sutures. Skin closure was performed with #2-0 monoderm barbed suture in running subcuticular fashion. The appropriate position for the umbilicus was marked. A core of skin and adipose tissue was removed with scalpel and bovie so as to accept the umbilicus. The umbilicus was inset with interrupted buried dermal #3-0 monocryl sutures. All of the wounds were cleaned. Drains were hooked to bulb suction. Skin glue was applied to the incisions. A Vioptix probe was placed on the flap skin island and appropriate readings noted. It was secured with tegaderm dressings. Instrument counts were reported to be correct. The patient was allowed to awaken from anesthesia. She was extubated without apparent complication. She was transferred to the bed in semi-Reyes position and was then taken to the recovery room in excellent condition. COMPLICATIONS: None. EBL: 100 mL     IV Fluid: 3500 mL crystalloid, no colloid. UOP:  550 mL    DRAINS: Niles x3. SPECIMENS: right mastectomy scar, right breast capsule, left breast reduction tissue.          Trinda Sever, MD    CC: Trinda Sever, MD

## 2017-08-09 NOTE — PROGRESS NOTES
Intensivist Evaluation:    Ms. Guadalupe Clark is a 46 y.o. F who presents to ICU following a scheduled removal of right breast tissue expanders/capsulectomy/NANCY flap balancing left breast reduction performed by Dr. Min Mercado and Dr. Duy Domínguez. She is very drowsy right now, but responsive. Following commands. Not complaining of any pain. Lungs clear to auscultation. Heart RRR. No lower extremity edema. Active bowel sounds.      PHM:  Right breast cancer s/p chemotherapy that was completed in 12/2016  Atopic dermatitis  Pleurisy     Afebrile  /86  HR 92  RR 10  Oxygen saturations 97% on 2L NC    Impression:   - Right breast cancer s/p NANCY flap    Plan:  - Maintain O2 saturations > 90%  - PRN Zofran  - Clear liquid diet  - Pain management morphine PCA per Plastic Surgery  - Encourage incentive spirometry  - DVT prophylaxis: SCD's, Heparin

## 2017-08-09 NOTE — H&P
Pre-op History and Physical    CC: BREAST CANCER   Breast CA (HCC)   HPI: 46y.o. year old female with BREAST CANCER   Breast CA ([facility] [address] for Procedure(s):  REMOVE RIGHT BREAST TISSUE EXPANDERS / CAPSULECTOMY / NANCY FLAP BALANCING LEFT BREAST REDUCTION . Past medical history:   Past Medical History:   Diagnosis Date    Atopic dermatitis     Breast cancer, right ([facility] [address] 07/2016    Pleurisy 2006    Thyroid nodule     benign      Past surgical history:   Past Surgical History:   Procedure Laterality Date    HX BREAST BIOPSY Right 2016    HX BREAST RECONSTRUCTION Right 2/23/2017    RIGHT BREAST RECONSTRUCTION  /  TISSUE EXPANDER / ALLODERM  performed by Kacie Valderrama MD at [place] [address] HX GYN  2002    conization    HX MASTECTOMY Right 2/23/2017    RIGHT BREAST TOTAL MASTECTOMY, AXILLARY LYMPH NODE DISSECTION, PORTACATH REMOVAL performed by Reynold Wayne MD at [facility] AMBULATORY OR    HX TONSILLECTOMY  1974    HX VASCULAR ACCESS  2016    portacath insertion and removal      Family history:   Family History   Problem Relation Age of Onset    Colon Cancer Mother 39    Colon Cancer Paternal Uncle 79    Colon Cancer Paternal Uncle 79      Social history:   Social History     Social History    Marital status:      Spouse name: N/A    Number of children: N/A    Years of education: N/A     Occupational History    Not on file.      Social History Main Topics    Smoking status: Never Smoker    Smokeless tobacco: Never Used    Alcohol use No    Drug use: No    Sexual activity: Not on file     Other Topics Concern    Not on file     Social History Narrative      Home Medications:   Prior to Admission medications    Not on File      Allergies: No Known Allergies   Review of systems:  Denies headache, fever, chills, weight change, congestion, sore throat, chest pain, shortness of breath, nausea, vomiting, diarrhea, constipation, abdominal pain, generalized weakness, muscle or joint pain, and rash.    Physical Exam:  Vitals: Blood pressure (!) 136/96, pulse 78, temperature 98.8 °F (37.1 °C), resp. rate 20, height 5' 6\" (1.676 m), weight 96.1 kg (211 lb 13.8 oz), last menstrual period 06/24/2016, SpO2 99 %.    General: awake and alert, NAD  Neck: supple  Cor: RRR  Lungs: clear  Abdomen: soft, non-tender, non-distended  Extremities: no edema  Skin: no rash    Impression: BREAST CANCER   Breast CA (HCC)    Plan:  Procedure(s):  REMOVE RIGHT BREAST TISSUE EXPANDERS / CAPSULECTOMY / NANCY FLAP BALANCING LEFT BREAST REDUCTION

## 2017-08-09 NOTE — ROUTINE PROCESS
1515: TRANSFER - IN REPORT:    Verbal report received from [facility] on [place]  being received from PACU for routine post - op      Report consisted of patients Situation, Background, Assessment and   Recommendations(SBAR). Information from the following report(s) SBAR, Kardex, OR Summary, Intake/Output, MAR, Accordion, Recent Results and Cardiac Rhythm NSR. was reviewed with the receiving nurse. Opportunity for questions and clarification was provided. Assessment completed upon patients arrival to unit and care assumed. 1900: Bedside and Verbal shift change report given to [address] \[facility]\"" by Danielle Damon RN. Report included the following information SBAR, Kardex, OR Summary, Intake/Output, MAR, Accordion, Recent Results and Cardiac Rhythm NSR. Nancie Ramirez

## 2017-08-09 NOTE — PROGRESS NOTES
Shift Summary    1515: Pt transferred into ICU 15. VSS. Family at bedside. Flaps WDL and numbers unchanged from PACU. Pt using Morphine PCA appropriately and is slightly drowsy. AOx4.     1730: Admission database complete. 1800: Pt resting in bed. No major changes.

## 2017-08-09 NOTE — PERIOP NOTES
TRANSFER - OUT REPORT:    Verbal report given to daniel rn(name) on Curt Dinh  being transferred to icu 15(unit) for routine post - op       Report consisted of patients Situation, Background, Assessment and   Recommendations(SBAR). Information from the following report(s) SBAR, Procedure Summary, Intake/Output and MAR was reviewed with the receiving nurse. Lines:   Venous Access Device Power port 07/28/16 Upper chest (subclavicular area, right (Active)       Peripheral IV 02/23/17 Left Arm (Active)       Peripheral IV 08/09/17 Left Hand (Active)   Site Assessment Clean, dry, & intact 8/9/2017  1:31 PM   Phlebitis Assessment 0 8/9/2017  1:31 PM   Infiltration Assessment 0 8/9/2017  1:31 PM   Dressing Status Clean, dry, & intact 8/9/2017  1:31 PM   Dressing Type Tape;Transparent 8/9/2017  1:31 PM   Hub Color/Line Status Pink 8/9/2017  1:31 PM        Opportunity for questions and clarification was provided.       Patient transported with:   Monitor  O2 @ 2 liters  Registered Nurse

## 2017-08-09 NOTE — IP AVS SNAPSHOT
Jenny Deluca 
 
 
 [address] [facility] 
[phone] Patient: Gerry Singer MRN: [id number] :1965 You are allergic to the following No active allergies Recent Documentation Height Weight BMI OB Status Smoking Status 1.676 m 96.1 kg 34.2 kg/m2 Postmenopausal Never Smoker Emergency Contacts Name Discharge Info Relation Home Work Mobile Homero Koch DISCHARGE CAREGIVER [3] Spouse [3] [phone] About your hospitalization You were admitted on:  2017 You last received care in the:  [facility] 4M POST SURG ORT 2 You were discharged on:  2017 Unit phone number:  [phone] Why you were hospitalized Your primary diagnosis was:  Not on File Your diagnoses also included:  Breast Ca (Hcc) Providers Seen During Your Hospitalizations Provider Role Specialty Primary office phone Neris Bains MD Attending Provider Plastic Surgery [phone] Your Primary Care Physician (PCP) Primary Care Physician Office Phone Office Fax Annel Osorio [phone] Follow-up Information Follow up With Details Comments Contact Info Veronica Mayo MD   [facility] [address] [place] 
[phone] Neris Bains MD In 3 days  [address] [facility] [address] [place] 
[phone] Current Discharge Medication List  
  
Notice You have not been prescribed any medications. Discharge Instructions Feli Ceron M.D., F.A.C.S. D.[facility] [phone] Microsurgical Breast Reconstruction Post-operative Instructions 1. Bra:  You should not wear a brassiere for two weeks following your procedure.   This is to prevent excessive compression on the blood supply to the flap. After the first two weeks, you may wear a soft bra that does not fit too tightly for the next month. Avoid underwire bras for one month. 2. Surgical Drains:  Please refer to the GARRET Drain Instructions sheet for details. 3. Activity:  Take it easy for the first several days. No cleaning, housework, or strenuous activity. Do not lift anything over 10 pounds, including children. No running, aerobics, or other strenuous activities until four weeks. However, do not remain constantly in bed. You should walk at least three times daily, with assistance if needed. It is normal to feel tight when standing for the first several weeks, so you may want to hunch over slightly when walking. 4. Bathing: You may shower after you go home. Use a long piece of string or yarn to make a necklace to loop through the GARRET drain tabs, so they are not dangling in the shower. NO tub baths, hot tubs, swimming, or any submersion in water for one week after removal of all GARRET drains. Skin glue covering your incisions will fall off on its own. If it is still present at two weeks, you may peel or scrub it off.  
 
5. Medication:  You will receive prescriptions for an antibiotic and pain medication. Take the antibiotic until it is finished and the pain medication as needed according to the directions. You will receive a prescription for aspirin for two weeks. Avoid non-steroidal anti-inflammatories (e.g. ibuprofen, advil, etc.) for two weeks after surgery. 6. Exercise: You may resume non-strenuous activities as tolerated two weeks after surgery. Normal activity can be instituted one month after surgery, starting slowly, and increasing as your body allows. Weight training, aerobics, sexual activity, and other vigorous activity should not be started until six weeks following surgery.  
 
7. Things to watch for:  If the NANCY flap skin turns purple, black, or cold, or if you experience excessive or sudden swelling, spreading or increasing redness, increasing pain, foul-smelling drainage, separation of any incisions, fever, shaking chills, or any other concerns, please call the office immediately ([phone]). 8. Follow-up appointment: please call the office at [phone] during regular business hours to schedule an appointment in 3-4 days. Dalia Diaz M.D., F.A.C.S. D.[facility] [phone] GARRET Drain Instructions Purpose: You have had surgery during which a Angel-Lopez drain, or GARRET drain, has been placed by your surgeon. A GARRET drain is a rubber tube which goes under the skin and drains excess fluid during the healing process so that this fluid does not accumulate. There is a one-way valve which allows the fluid to collect in the bulb on the end of the drain. The drain is usually held in place by a single stitch. The color of the drainage can range from reddish to pink to straw-colored. Care of the drain:  Caring for the GARRET drain is fairly easy. First, protect the drain so that it does not get pulled inadvertently. You may fasten them to your clothing with a safety pin through the floppy tag on the bulb. DO NOT place a pin through either the drain tubing or the bulb itself. The drain works by maintaining a constant low pressure of suction when the bulb is in the collapsed (squeezed in) position. If the bulb will not maintain this collapsed position when it is recapped, please call the office, as the drain may not be working properly. If you had an On-Q® pain pump placed during your surgery, you may wish to keep the black tim pack once the On-Q® has been removed to carry the GARRET drain bulbs. Measuring the drainage:  Grasp the bulb in one hand and remove the cap with the other hand. This will cause the bulb to relax into a round shape.   Holding the open end over a specimen cup, squirt the fluid into the cup by squeezing the bulb. Once the bulb is empty, squeeze it in (collapse it) with one hand, and replace the cap with your other hand. Then holding the measuring cup level, note how much fluid there is (in milliliters, mL), and record this number on the chart below. Discard the fluid in the toilet and rinse out the specimen cup. Note: your specimen cup may be in cubic centimeters (cc). 1 cc = 1 mL. Removal:  The GARRET drain will be removed in the office when the daily drainage is at a low enough level. You may be asked to call the office with your measuring totals to determine if the drain(s) is (are) ready to be removed. Removal involves minimal discomfort without the need for anesthesia. The drain site in the skin heals on its own once the drain is removed without any further stitches. Showering: Your surgeon may give you permission to shower while you have one or more GARRET drains in. Just let the water run over the drain sites and then pat them dry when you are done. Some patients like to place a long string necklace around their necks during showers to which they can safety pin the tag on the bulb to prevent it from dangling. DO NOT take a tub bath, go swimming (pool or lake/ocean), or otherwise submerge your body in water before all GARRET drains have been removed and you are permitted by your surgeon. Things to look out for:  Please call the office immediately ([phone]) if you notice: 
? Sudden bright or dark red bleeding into the bulb or around the drain site in the skin ? Dislodgment of the GARRET drain or if the drain falls out ? Spreading redness around the drain site in the skin ? Cloudy or foul-smelling drainage in the bulb or around the drain site ? Fever, shaking chills, excessive swelling, pain or discomfort ? Any other concerns or questions Date #1 (AM) #1 (PM) Daily Total #1 
(AM+PM) #2 (AM) #2 (PM) Daily Total 
#2 
(AM+PM) #3 (AM) #3 (PM) Daily Total 
#3 
(AM+PM) #4 (AM) #4 (PM) Daily Total #4 
(AM+PM) Discharge Orders None Introducing \[facility] [place] [facility]\"" & HEALTH SERVICES! Dear Chikis Moreira: 
Thank you for requesting a GIVTED account. Our records indicate that you already have an active GIVTED account. You can access your account anytime at [email or web address] "[email or web address] Did you know that you can access your hospital and ER discharge instructions at any time in GIVTED? You can also review all of your test results from your hospital stay or ER visit. Additional Information If you have questions, please visit the Frequently Asked Questions section of the GIVTED website at [email or web address] Remember, GIVTED is NOT to be used for urgent needs. For medical emergencies, dial 911. Now available from your iPhone and Android! General Information Please provide this summary of care documentation to your next provider. Patient Signature:  ____________________________________________________________ Date:  ____________________________________________________________  
  
[facility] Provider Signature:  ____________________________________________________________ Date:  ____________________________________________________________

## 2017-08-09 NOTE — PERIOP NOTES
1020 - Patient family updated on surgical progress and patient well being. 1210- Patient family updated on surgical progress and patient well being.

## 2017-08-09 NOTE — ANESTHESIA PREPROCEDURE EVALUATION
Anesthetic History   No history of anesthetic complications            Review of Systems / Medical History  Patient summary reviewed, nursing notes reviewed and pertinent labs reviewed    Pulmonary  Within defined limits                 Neuro/Psych   Within defined limits           Cardiovascular  Within defined limits                Exercise tolerance: >4 METS  Comments: Not on beta blocker   GI/Hepatic/Renal  Within defined limits              Endo/Other        Cancer     Other Findings              Physical Exam    Airway  Mallampati: II  TM Distance: 4 - 6 cm  Neck ROM: normal range of motion   Mouth opening: Normal     Cardiovascular  Regular rate and rhythm,  S1 and S2 normal,  no murmur, click, rub, or gallop  Rhythm: regular  Rate: normal         Dental  No notable dental hx       Pulmonary  Breath sounds clear to auscultation               Abdominal  GI exam deferred       Other Findings            Anesthetic Plan    ASA: 2  Anesthesia type: general          Induction: Intravenous  Anesthetic plan and risks discussed with: Patient

## 2017-08-10 LAB
BACTERIA SPEC CULT: NORMAL
BACTERIA SPEC CULT: NORMAL
SERVICE CMNT-IMP: NORMAL

## 2017-08-10 PROCEDURE — 77010033678 HC OXYGEN DAILY

## 2017-08-10 PROCEDURE — 77030027138 HC INCENT SPIROMETER -A

## 2017-08-10 PROCEDURE — 65270000029 HC RM PRIVATE

## 2017-08-10 PROCEDURE — 74011250637 HC RX REV CODE- 250/637: Performed by: PLASTIC SURGERY

## 2017-08-10 PROCEDURE — 74011250636 HC RX REV CODE- 250/636: Performed by: PLASTIC SURGERY

## 2017-08-10 RX ORDER — HYDROMORPHONE HYDROCHLORIDE 2 MG/1
4 TABLET ORAL
Status: DISCONTINUED | OUTPATIENT
Start: 2017-08-10 | End: 2017-08-12 | Stop reason: HOSPADM

## 2017-08-10 RX ORDER — MORPHINE SULFATE 4 MG/ML
2 INJECTION, SOLUTION INTRAMUSCULAR; INTRAVENOUS
Status: DISCONTINUED | OUTPATIENT
Start: 2017-08-10 | End: 2017-08-12 | Stop reason: HOSPADM

## 2017-08-10 RX ORDER — HYDROMORPHONE HYDROCHLORIDE 2 MG/1
2 TABLET ORAL
Status: DISCONTINUED | OUTPATIENT
Start: 2017-08-10 | End: 2017-08-12 | Stop reason: HOSPADM

## 2017-08-10 RX ADMIN — ONDANSETRON 4 MG: 2 INJECTION INTRAMUSCULAR; INTRAVENOUS at 07:47

## 2017-08-10 RX ADMIN — ACETAMINOPHEN 650 MG: 325 TABLET ORAL at 13:50

## 2017-08-10 RX ADMIN — HEPARIN SODIUM 5000 UNITS: 5000 INJECTION, SOLUTION INTRAVENOUS; SUBCUTANEOUS at 17:10

## 2017-08-10 RX ADMIN — HYDROMORPHONE HYDROCHLORIDE 2 MG: 2 TABLET ORAL at 15:34

## 2017-08-10 RX ADMIN — HYDROMORPHONE HYDROCHLORIDE 2 MG: 2 TABLET ORAL at 07:47

## 2017-08-10 RX ADMIN — HYDROMORPHONE HYDROCHLORIDE 2 MG: 2 TABLET ORAL at 12:32

## 2017-08-10 RX ADMIN — DOCUSATE SODIUM 100 MG: 100 CAPSULE ORAL at 08:34

## 2017-08-10 RX ADMIN — HEPARIN SODIUM 5000 UNITS: 5000 INJECTION, SOLUTION INTRAVENOUS; SUBCUTANEOUS at 00:58

## 2017-08-10 RX ADMIN — CEFAZOLIN 2 G: 1 INJECTION, POWDER, FOR SOLUTION INTRAMUSCULAR; INTRAVENOUS; PARENTERAL at 05:01

## 2017-08-10 RX ADMIN — SODIUM CHLORIDE, SODIUM LACTATE, POTASSIUM CHLORIDE, AND CALCIUM CHLORIDE 125 ML/HR: 600; 310; 30; 20 INJECTION, SOLUTION INTRAVENOUS at 05:59

## 2017-08-10 RX ADMIN — HYDROMORPHONE HYDROCHLORIDE 2 MG: 2 TABLET ORAL at 21:08

## 2017-08-10 RX ADMIN — HEPARIN SODIUM 5000 UNITS: 5000 INJECTION, SOLUTION INTRAVENOUS; SUBCUTANEOUS at 08:34

## 2017-08-10 NOTE — PROGRESS NOTES
1900 - Bedside shift change report given to Trey Ramirez RN (oncoming nurse) by Lindy Armstrong RN (offgoing nurse). Report included the following information SBAR, OR Summary, Procedure Summary, Intake/Output, Recent Results, Med Rec Status, Cardiac Rhythm NSR and Alarm Parameters . 2000 - Assessment done. See flowsheet. 0000 - Re assessment done. No changes. Pt resting comfortably in bed w/ eyes closed, resp even, unlabored, VSS  0600 - Dr Court Soni in to see pt. Updated on overnight events. Orders rec'd  0700 - Bedside shift change report given to Hayden Greco and Itzel Taylor RN (oncoming nurse) by Trey Ramirez RN (offgoing nurse). Report included the following information Procedure Summary, Intake/Output, Cardiac Rhythm NSR and Alarm Parameters .

## 2017-08-10 NOTE — PROGRESS NOTES
Problem: Falls - Risk of  Goal: *Absence of Falls  Document Cathleen Fall Risk and appropriate interventions in the flowsheet.    Outcome: Progressing Towards Goal  Fall Risk Interventions:  Mobility Interventions: Assess mobility with egress test, Bed/chair exit alarm, Communicate number of staff needed for ambulation/transfer, OT consult for ADLs, Patient to call before getting OOB, PT Consult for mobility concerns, PT Consult for assist device competence, Strengthening exercises (ROM-active/passive)           Medication Interventions: Assess postural VS orthostatic hypotension, Bed/chair exit alarm, Evaluate medications/consider consulting pharmacy, Patient to call before getting OOB, Teach patient to arise slowly, Utilize gait belt for transfers/ambulation     Elimination Interventions: Bed/chair exit alarm, Call light in reach, Patient to call for help with toileting needs, Toilet paper/wipes in reach, Toileting schedule/hourly rounds

## 2017-08-10 NOTE — PROGRESS NOTES
I met with pt as an introductory visit to assess pt.'s discharge needs. Per pt,there are no anticipated discharge needs. Pt states she has a helpful  who can assist her @ home if needed.   Pan Beatty

## 2017-08-10 NOTE — PROGRESS NOTES
1900 - Bedside shift change report given to Rubi Chapin RN (oncoming nurse) by Melissa Chambers RN and Ruel Nieves RN (offgoing nurse). Report included the following information SBAR, Intake/Output, Cardiac Rhythm SR, ST and Alarm Parameters . 1930 - Assisted pt to [facility] w/ one assist, gain steady. Pt voided moderate amt of clear urine.  Assisted back to bed    2000 - Transfer report called to Tuan Petersen RN.   2030 - Handoff report given to JAM Wilkes

## 2017-08-10 NOTE — PROGRESS NOTES
Problem: Falls - Risk of  Goal: *Absence of Falls  Document Cathleen Fall Risk and appropriate interventions in the flowsheet.    Outcome: Progressing Towards Goal  Fall Risk Interventions:              Medication Interventions: Bed/chair exit alarm, Patient to call before getting OOB     Elimination Interventions: Bed/chair exit alarm, Call light in reach, Patient to call for help with toileting needs

## 2017-08-10 NOTE — PROGRESS NOTES
POD#1 s/p delayed unilateral NANCY to right, balancing left breast reduction  Had some nausea last evening, none since 9p or so  AVSS  JPs serosang  Vioptix: 64%, steady trend  Right breast: flap soft, warm, inc CDI, no hematoma  Left breast: inc CDI, no hematoma; NAC viable appearance  Abd: soft,ND; umbo viable; inc CDI  Stable, doing great overall  Plan: adv diet  D/c pca, IVF, klein  Po pain meds  OOB to chair  Change flap checks to q4h  DVT proph, incent spir  Transfer to floor today  Cont vioptix

## 2017-08-10 NOTE — PROGRESS NOTES
4089: Bedside shift change report given to CHAD Garcia RN/KELLEE Stauffer, JAM (oncoming nurse) by [place]. Ammy Valle RN (offgoing nurse). Report included the following information SBAR, Kardex, Procedure Summary, Intake/Output, MAR, Accordion, Recent Results, Med Rec Status and Cardiac Rhythm NSR/ST. Chart, orders, labs reviewed, ITRACE complete. Incision and vioptix assessed with PM RN at bedside. 1015: Verduzco D/C'd without complications. Morphine PCA D/C'd per MD order. Assisted patient into chair x 1 person assist. Patient with slow steady gait. Tolerated up to chair without difficulty. VSS. Call bell left within reach. 1215: Lunch served. Patient set up to eat in chair. VSS. No complaints of pain at this time. Call bell and phone in reach. 1232: Patient c/o pain at abdominal incision site. See pain flowsheet. Medicated with 2mg Dilaudid see EMAR.    1330: Patient appears to be sleeping comfortably in chair in no acute distress. VSS. Call bell in reach. 1553: large amount of Serosanguinous drainage noted on patient's gown and pillow from #4 GARRET drain site. Site cleaned with chlorhexidine and sterile 4 x 4 drain sponge applied. Gown changed. #4 GARRET drain emptied and recharge. Drain unable to maintain charge. Appears #4 drain has air leak. Dr. Alex Warner paged and notified. Site clean/dry/intact. Will continue to monitor site. No new orders received. 1740: Patient appears to be resting comfortably in bed talking on telephone in no acute distress. GARRET site clean, dry, intact. VSS. Call bell in reach. Dinner ordered. 1800: GARRET #4 site dressing changed. Serosanguinous drainage noted medium amount. Will continue to monitor. Dr. Alex Warner aware.

## 2017-08-11 PROCEDURE — 74011250637 HC RX REV CODE- 250/637: Performed by: PLASTIC SURGERY

## 2017-08-11 PROCEDURE — 77010033678 HC OXYGEN DAILY

## 2017-08-11 PROCEDURE — 65270000029 HC RM PRIVATE

## 2017-08-11 PROCEDURE — 74011250636 HC RX REV CODE- 250/636: Performed by: PLASTIC SURGERY

## 2017-08-11 RX ADMIN — HYDROMORPHONE HYDROCHLORIDE 2 MG: 2 TABLET ORAL at 19:18

## 2017-08-11 RX ADMIN — ACETAMINOPHEN 650 MG: 325 TABLET ORAL at 16:12

## 2017-08-11 RX ADMIN — HEPARIN SODIUM 5000 UNITS: 5000 INJECTION, SOLUTION INTRAVENOUS; SUBCUTANEOUS at 19:16

## 2017-08-11 RX ADMIN — HEPARIN SODIUM 5000 UNITS: 5000 INJECTION, SOLUTION INTRAVENOUS; SUBCUTANEOUS at 01:00

## 2017-08-11 RX ADMIN — HYDROMORPHONE HYDROCHLORIDE 4 MG: 2 TABLET ORAL at 06:42

## 2017-08-11 RX ADMIN — ACETAMINOPHEN 650 MG: 325 TABLET ORAL at 01:11

## 2017-08-11 RX ADMIN — HEPARIN SODIUM 5000 UNITS: 5000 INJECTION, SOLUTION INTRAVENOUS; SUBCUTANEOUS at 10:17

## 2017-08-11 RX ADMIN — HYDROMORPHONE HYDROCHLORIDE 2 MG: 2 TABLET ORAL at 13:45

## 2017-08-11 RX ADMIN — DOCUSATE SODIUM 100 MG: 100 CAPSULE ORAL at 10:17

## 2017-08-11 RX ADMIN — ACETAMINOPHEN 650 MG: 325 TABLET ORAL at 21:57

## 2017-08-11 RX ADMIN — HYDROMORPHONE HYDROCHLORIDE 2 MG: 2 TABLET ORAL at 01:11

## 2017-08-11 NOTE — PROGRESS NOTES
Patients  and daughter in room,  stated \"were staying the night\", told  that the young child is not encouraged to stay, he will not be able to fall asleep if daughter stays in the room, he stated \"I will not fall asleep\". Passed on to next shift, at this time  and daughter sitting up eating dinner at this time.

## 2017-08-11 NOTE — PROGRESS NOTES
1615: received call from PCT, pt's vitals were irregular. Pt O2 sats 89% on RA, Temp 99.9. Pt  instructed on incentive, given tylenol. ,  Pt complaining of right distal calf pain, paged Dr. Steve Peters to report. 1630:  Dr. Steve Peters ordered scd's attached, IS 10x's/hour, pt up out of room several times today, sit up in bed, agreed with tylenol for fever. Bedside shift change report given to [employer] (oncoming nurse) by Staci Fay (offgoing nurse). Report included the following information SBAR, Kardex, Intake/Output and MAR.

## 2017-08-11 NOTE — PROGRESS NOTES
Primary Nurse Donato Zavala RN and Kamar Ya RN performed a dual skin assessment on this patient No impairment noted  Jimmy score is 19

## 2017-08-11 NOTE — PROGRESS NOTES
POD#2 s/p delayed right breast recon with NANCY flap, balancing left breast reduction  Doing well  AVSS  Left breast GARRET leaking around exit site, I removed it  Remaining JPs serosang  vioptix 62%, steady trend  Right breast: flap warm, soft  Left breast: no hematoma; NAC viable, tissues soft  Abd: soft, ND; inc CDI; umbo intact  Imp: stable  Plan: inc activity  DVT proph, pulm toilet  Cont GARRET x3  prob home tomorrow

## 2017-08-11 NOTE — PROGRESS NOTES
Bedside and Verbal shift change report given to Maria Del Carmen iWn RN (oncoming nurse) by ANNA Mayer (offgoing nurse). Report given with SBAR, Kardex, OR Summary, Intake/Output, MAR and Recent Results.

## 2017-08-12 VITALS
HEART RATE: 98 BPM | BODY MASS INDEX: 34.05 KG/M2 | SYSTOLIC BLOOD PRESSURE: 127 MMHG | DIASTOLIC BLOOD PRESSURE: 85 MMHG | WEIGHT: 211.86 LBS | RESPIRATION RATE: 18 BRPM | HEIGHT: 66 IN | TEMPERATURE: 98.4 F | OXYGEN SATURATION: 96 %

## 2017-08-12 PROCEDURE — 74011250636 HC RX REV CODE- 250/636: Performed by: PLASTIC SURGERY

## 2017-08-12 PROCEDURE — 74011250637 HC RX REV CODE- 250/637: Performed by: PLASTIC SURGERY

## 2017-08-12 RX ADMIN — HYDROMORPHONE HYDROCHLORIDE 2 MG: 2 TABLET ORAL at 15:24

## 2017-08-12 RX ADMIN — HYDROMORPHONE HYDROCHLORIDE 2 MG: 2 TABLET ORAL at 06:03

## 2017-08-12 RX ADMIN — HEPARIN SODIUM 5000 UNITS: 5000 INJECTION, SOLUTION INTRAVENOUS; SUBCUTANEOUS at 09:30

## 2017-08-12 RX ADMIN — HEPARIN SODIUM 5000 UNITS: 5000 INJECTION, SOLUTION INTRAVENOUS; SUBCUTANEOUS at 01:42

## 2017-08-12 RX ADMIN — DOCUSATE SODIUM 100 MG: 100 CAPSULE ORAL at 09:30

## 2017-08-12 RX ADMIN — BENZOCAINE AND MENTHOL 1 LOZENGE: 15; 3.6 LOZENGE ORAL at 12:25

## 2017-08-12 NOTE — PROGRESS NOTES
POD#3  Doing great; c/o sore throat  AVSS  JPs serosang, still too high for removal  Right breast: flap warm, soft  Left breast: soft, no hematoma or seroma, NAC viable  abd intact  Stable  Plan: cepacol lozenges prn  D/c home today

## 2017-08-12 NOTE — DISCHARGE SUMMARY
[facility]   [address] [facility], [address] [facility]   [address]        Name:  Elder Arrieta   MR#:  [id number]   :  1965   Account #:  [de-identified]        Date of Adm:  2017       PRINCIPAL DIAGNOSIS Right breast cancer. SECONDARY DIAGNOSES    1. Acquired absence of right breast.   2. Disproportion of reconstructed breast.     PROCEDURES: On 2017, the patient underwent delayed   reconstruction of the right breast with NANCY free flap, and balancing left   breast reduction by Dr. Sussy Roberson and Dr. Placido Jorgensen. SUMMARY OF CLINICAL COURSE: The patient is a 55-year-old   female with a right-sided breast cancer, who underwent procedure   noted above. She did quite well. She was monitored in the ICU for the   first day with q.1 h. flap checks. There were no flap specific   complications. She was discharged home on day #3, tolerating a   regular diet. The GARRET had been removed from the left breast, but she   still had GARRET drains in the right breast and 2 in the abdomen. FOLLOWUP: Follow up with Dr. Lisa Strong in 3-4 days. The patient will   call for an appointment. DISCHARGE DIET: Regular. DISCHARGE INSTRUCTIONS: No strenuous activity or heavy lifting. No bath, but she may shower. DISCHARGE MEDICATIONS   1. Dilaudid 2-4 mg p.o. q.4-6 h. p.r.n. pain. 2. Colace 100 mg p.o. b.i.d.   3. Duricef 500 mg p.o. b.i.d   4. Aspirin 325 mg p.o. daily x2 weeks. CONDITION ON DISCHARGE: Stable. COMPLICATIONS: None.         Efren Reid MD      [facility]   D:  2017   11:49   T:  2017   14:22   Job #:  [id number]

## 2017-08-12 NOTE — DISCHARGE INSTRUCTIONS
Milla Strong M.D., F.A.C.S. D.[facility]  [phone]    Microsurgical Breast Reconstruction Post-operative Instructions      1. Bra:  You should not wear a brassiere for two weeks following your procedure. This is to prevent excessive compression on the blood supply to the flap. After the first two weeks, you may wear a soft bra that does not fit too tightly for the next month. Avoid underwire bras for one month. 2. Surgical Drains:  Please refer to the GARRET Drain Instructions sheet for details. 3. Activity:  Take it easy for the first several days. No cleaning, housework, or strenuous activity. Do not lift anything over 10 pounds, including children. No running, aerobics, or other strenuous activities until four weeks. However, do not remain constantly in bed. You should walk at least three times daily, with assistance if needed. It is normal to feel tight when standing for the first several weeks, so you may want to hunch over slightly when walking. 4. Bathing: You may shower after you go home. Use a long piece of string or yarn to make a necklace to loop through the GARRET drain tabs, so they are not dangling in the shower. NO tub baths, hot tubs, swimming, or any submersion in water for one week after removal of all GARRET drains. Skin glue covering your incisions will fall off on its own. If it is still present at two weeks, you may peel or scrub it off.     5. Medication:  You will receive prescriptions for an antibiotic and pain medication. Take the antibiotic until it is finished and the pain medication as needed according to the directions. You will receive a prescription for aspirin for two weeks. Avoid non-steroidal anti-inflammatories (e.g. ibuprofen, advil, etc.) for two weeks after surgery. 6. Exercise: You may resume non-strenuous activities as tolerated two weeks after surgery.   Normal activity can be instituted one month after surgery, starting slowly, and increasing as your body allows. Weight training, aerobics, sexual activity, and other vigorous activity should not be started until six weeks following surgery. 7. Things to watch for:  If the NANCY flap skin turns purple, black, or cold, or if you experience excessive or sudden swelling, spreading or increasing redness, increasing pain, foul-smelling drainage, separation of any incisions, fever, shaking chills, or any other concerns, please call the office immediately ([phone]). 8. Follow-up appointment: please call the office at [phone] during regular business hours to schedule an appointment in 3-4 days. Danita Albarran M.D., F.A.C.S. D.[facility]  [phone]    GARRET Drain Instructions    Purpose: You have had surgery during which a Angel-Lopez drain, or GARRET drain, has been placed by your surgeon. A GARRET drain is a rubber tube which goes under the skin and drains excess fluid during the healing process so that this fluid does not accumulate. There is a one-way valve which allows the fluid to collect in the bulb on the end of the drain. The drain is usually held in place by a single stitch. The color of the drainage can range from reddish to pink to straw-colored. Care of the drain:  Caring for the GARRET drain is fairly easy. First, protect the drain so that it does not get pulled inadvertently. You may fasten them to your clothing with a safety pin through the floppy tag on the bulb. DO NOT place a pin through either the drain tubing or the bulb itself. The drain works by maintaining a constant low pressure of suction when the bulb is in the collapsed (squeezed in) position. If the bulb will not maintain this collapsed position when it is recapped, please call the office, as the drain may not be working properly.   If you had an On-Q® pain pump placed during your surgery, you may wish to keep the black tim pack once the On-Q® has been removed to carry the GARRET drain bulbs. Measuring the drainage:  Grasp the bulb in one hand and remove the cap with the other hand. This will cause the bulb to relax into a round shape. Holding the open end over a specimen cup, squirt the fluid into the cup by squeezing the bulb. Once the bulb is empty, squeeze it in (collapse it) with one hand, and replace the cap with your other hand. Then holding the measuring cup level, note how much fluid there is (in milliliters, mL), and record this number on the chart below. Discard the fluid in the toilet and rinse out the specimen cup. Note: your specimen cup may be in cubic centimeters (cc). 1 cc = 1 mL. Removal:  The GARRET drain will be removed in the office when the daily drainage is at a low enough level. You may be asked to call the office with your measuring totals to determine if the drain(s) is (are) ready to be removed. Removal involves minimal discomfort without the need for anesthesia. The drain site in the skin heals on its own once the drain is removed without any further stitches. Showering: Your surgeon may give you permission to shower while you have one or more GARRET drains in. Just let the water run over the drain sites and then pat them dry when you are done. Some patients like to place a long string necklace around their necks during showers to which they can safety pin the tag on the bulb to prevent it from dangling. DO NOT take a tub bath, go swimming (pool or lake/ocean), or otherwise submerge your body in water before all GARRET drains have been removed and you are permitted by your surgeon.                 Things to look out for:  Please call the office immediately ([phone]) if you notice:   Sudden bright or dark red bleeding into the bulb or around the drain site in the skin   Dislodgment of the GARRET drain or if the drain falls out   Spreading redness around the drain site in the skin   Cloudy or foul-smelling drainage in the bulb or around the drain site   Fever, shaking chills, excessive swelling, pain or discomfort   Any other concerns or questions        Date           #1 (AM)             #1 (PM)             Daily Total #1  (AM+PM)             #2 (AM)           #2 (PM)           Daily  Total  #2  (AM+PM)           #3 (AM)           #3 (PM)           Daily  Total  #3  (AM+PM)           #4 (AM)             #4 (PM)             Daily Total #4  (AM+PM)

## 2017-11-10 ENCOUNTER — HOSPITAL ENCOUNTER (OUTPATIENT)
Dept: LAB | Age: 52
Discharge: HOME | End: 2017-11-10

## 2017-11-10 PROCEDURE — 88305 TISSUE EXAM BY PATHOLOGIST: CPT | Performed by: PLASTIC SURGERY

## 2018-02-06 ENCOUNTER — TELEPHONE (OUTPATIENT)
Dept: ONCOLOGY | Age: 53
End: 2018-02-06

## 2018-02-06 NOTE — TELEPHONE ENCOUNTER
Patient is on no treatment with our office. Defer to PCP for questions re:  TB testing. Call returned. Manager answered phone and RN advised could not speak about care.   Thanked for assist.

## 2018-02-06 NOTE — TELEPHONE ENCOUNTER
Liang Mccann (pt) called in stated she has TB test done last August 2017. Pt stated her job is requesting she gets another one done. Pt wants to know if this is ok or not.  Pt requesting c/b from nurse asap @ [phone]

## 2018-02-07 NOTE — TELEPHONE ENCOUNTER
Call to patient's cell #. Detailed message left no contraindication for TB test from oncology perspective as is on no treatment. Advised to follow with PCP also.

## 2018-02-19 ENCOUNTER — OFFICE VISIT (OUTPATIENT)
Dept: ONCOLOGY | Age: 53
End: 2018-02-19

## 2018-02-19 VITALS
OXYGEN SATURATION: 98 % | BODY MASS INDEX: 34.55 KG/M2 | HEIGHT: 66 IN | HEART RATE: 69 BPM | RESPIRATION RATE: 16 BRPM | TEMPERATURE: 98.4 F | WEIGHT: 215 LBS | SYSTOLIC BLOOD PRESSURE: 137 MMHG | DIASTOLIC BLOOD PRESSURE: 88 MMHG

## 2018-02-19 DIAGNOSIS — Z90.11 H/O MASTECTOMY, RIGHT: ICD-10-CM

## 2018-02-19 DIAGNOSIS — E04.9 THYROID ENLARGEMENT: ICD-10-CM

## 2018-02-19 DIAGNOSIS — Z85.3 HISTORY OF BREAST CANCER: Primary | ICD-10-CM

## 2018-02-19 DIAGNOSIS — E55.9 VITAMIN D DEFICIENCY: ICD-10-CM

## 2018-02-19 DIAGNOSIS — Z98.890 HISTORY OF RECONSTRUCTION OF BOTH BREASTS: ICD-10-CM

## 2018-02-19 RX ORDER — CEFADROXIL 500 MG/1
CAPSULE ORAL
COMMUNITY
Start: 2017-11-27 | End: 2019-07-01 | Stop reason: ALTCHOICE

## 2018-02-20 LAB
25(OH)D3+25(OH)D2 SERPL-MCNC: 32.3 NG/ML (ref 30–100)
ALBUMIN SERPL-MCNC: 4.2 G/DL (ref 3.5–5.5)
ALBUMIN/GLOB SERPL: 1.8 {RATIO} (ref 1.2–2.2)
ALP SERPL-CCNC: 90 IU/L (ref 39–117)
ALT SERPL-CCNC: 15 IU/L (ref 0–32)
AST SERPL-CCNC: 15 IU/L (ref 0–40)
BASOPHILS # BLD AUTO: 0 X10E3/UL (ref 0–0.2)
BASOPHILS NFR BLD AUTO: 0 %
BILIRUB SERPL-MCNC: 0.5 MG/DL (ref 0–1.2)
BUN SERPL-MCNC: 10 MG/DL (ref 6–24)
BUN/CREAT SERPL: 14 (ref 9–23)
CALCIUM SERPL-MCNC: 9.6 MG/DL (ref 8.7–10.2)
CHLORIDE SERPL-SCNC: 101 MMOL/L (ref 96–106)
CO2 SERPL-SCNC: 25 MMOL/L (ref 18–29)
CREAT SERPL-MCNC: 0.72 MG/DL (ref 0.57–1)
EOSINOPHIL # BLD AUTO: 0.1 X10E3/UL (ref 0–0.4)
EOSINOPHIL NFR BLD AUTO: 1 %
ERYTHROCYTE [DISTWIDTH] IN BLOOD BY AUTOMATED COUNT: 17.4 % (ref 12.3–15.4)
GFR SERPLBLD CREATININE-BSD FMLA CKD-EPI: 111 ML/MIN/1.73
GFR SERPLBLD CREATININE-BSD FMLA CKD-EPI: 97 ML/MIN/1.73
GLOBULIN SER CALC-MCNC: 2.3 G/DL (ref 1.5–4.5)
GLUCOSE SERPL-MCNC: 81 MG/DL (ref 65–99)
HCT VFR BLD AUTO: 40 % (ref 34–46.6)
HGB BLD-MCNC: 12.2 G/DL (ref 11.1–15.9)
IMM GRANULOCYTES # BLD: 0 X10E3/UL (ref 0–0.1)
IMM GRANULOCYTES NFR BLD: 0 %
LYMPHOCYTES # BLD AUTO: 2.2 X10E3/UL (ref 0.7–3.1)
LYMPHOCYTES NFR BLD AUTO: 50 %
MCH RBC QN AUTO: 24.1 PG (ref 26.6–33)
MCHC RBC AUTO-ENTMCNC: 30.5 G/DL (ref 31.5–35.7)
MCV RBC AUTO: 79 FL (ref 79–97)
MONOCYTES # BLD AUTO: 0.3 X10E3/UL (ref 0.1–0.9)
MONOCYTES NFR BLD AUTO: 6 %
NEUTROPHILS # BLD AUTO: 2 X10E3/UL (ref 1.4–7)
NEUTROPHILS NFR BLD AUTO: 43 %
PLATELET # BLD AUTO: 243 X10E3/UL (ref 150–379)
POTASSIUM SERPL-SCNC: 4.2 MMOL/L (ref 3.5–5.2)
PROT SERPL-MCNC: 6.5 G/DL (ref 6–8.5)
RBC # BLD AUTO: 5.06 X10E6/UL (ref 3.77–5.28)
SODIUM SERPL-SCNC: 141 MMOL/L (ref 134–144)
TSH SERPL DL<=0.005 MIU/L-ACNC: 1.56 UIU/ML (ref 0.45–4.5)
WBC # BLD AUTO: 4.5 X10E3/UL (ref 3.4–10.8)

## 2018-06-20 ENCOUNTER — TELEPHONE (OUTPATIENT)
Dept: ONCOLOGY | Age: 53
End: 2018-06-20

## 2018-06-20 NOTE — TELEPHONE ENCOUNTER
Patient called requesting a return call. Patient believes that her cancer has returned and would like to get scans done asap.  Please return patient's call to discuss [phone].  tahir

## 2018-06-20 NOTE — TELEPHONE ENCOUNTER
Call returned to patient. HIPAA verified. Reports she is having pain under left arm. States no pain to left breast.  Also some burning to back of left shoulder. Reports this started about one week ago. Advised of provider note. Verbalized understanding & agrees to call scheduling to see if mammogram can be done sooner than 7/5/18. She will call this office if further assist is needed.

## 2018-06-22 ENCOUNTER — HOSPITAL ENCOUNTER (OUTPATIENT)
Dept: MAMMOGRAPHY | Age: 53
Discharge: HOME | End: 2018-06-22
Attending: INTERNAL MEDICINE
Payer: COMMERCIAL

## 2018-06-22 DIAGNOSIS — Z12.31 VISIT FOR SCREENING MAMMOGRAM: ICD-10-CM

## 2018-06-22 DIAGNOSIS — Z85.3 HX: BREAST CANCER: ICD-10-CM

## 2018-06-22 PROCEDURE — 77063 BREAST TOMOSYNTHESIS BI: CPT

## 2018-07-02 ENCOUNTER — TELEPHONE (OUTPATIENT)
Dept: ONCOLOGY | Age: 53
End: 2018-07-02

## 2018-07-02 NOTE — TELEPHONE ENCOUNTER
Conversation: Referral Request   (Oldest Message First)   Jamie Mention   to David Muhammad NP           7/2/18 11:29 AM   Doctor at my primary care physician office ([phone]), did a chest x-ray.  No results have been provided to me.  Is it their office or your office that is recommending I get an MRI?       Comments:      Please call [phone] about MRI screening.  They state they need authorization form in order to schedule. Cathy Verma [phone]

## 2018-07-03 ENCOUNTER — OFFICE VISIT (OUTPATIENT)
Dept: ONCOLOGY | Age: 53
End: 2018-07-03

## 2018-07-03 VITALS
DIASTOLIC BLOOD PRESSURE: 90 MMHG | WEIGHT: 207 LBS | TEMPERATURE: 98 F | HEIGHT: 66 IN | HEART RATE: 77 BPM | RESPIRATION RATE: 16 BRPM | SYSTOLIC BLOOD PRESSURE: 134 MMHG | BODY MASS INDEX: 33.27 KG/M2 | OXYGEN SATURATION: 98 %

## 2018-07-03 DIAGNOSIS — Z90.11 H/O MASTECTOMY, RIGHT: ICD-10-CM

## 2018-07-03 DIAGNOSIS — Z85.3 HISTORY OF BREAST CANCER: Primary | ICD-10-CM

## 2018-07-03 DIAGNOSIS — M54.6 ACUTE LEFT-SIDED THORACIC BACK PAIN: ICD-10-CM

## 2018-07-03 DIAGNOSIS — E04.9 THYROID ENLARGEMENT: ICD-10-CM

## 2018-07-03 DIAGNOSIS — E04.9 GOITER: ICD-10-CM

## 2018-07-03 DIAGNOSIS — Z98.890 HISTORY OF RECONSTRUCTION OF BOTH BREASTS: ICD-10-CM

## 2018-07-03 RX ORDER — ZINC GLUCONATE 10 MG
LOZENGE ORAL DAILY
COMMUNITY
End: 2019-07-01 | Stop reason: ALTCHOICE

## 2018-07-03 RX ORDER — GLUCOSAMINE SULFATE 1500 MG
POWDER IN PACKET (EA) ORAL DAILY
COMMUNITY

## 2018-07-03 NOTE — PROGRESS NOTES
HEME/ONC PROGRESS NOTE       Bc Coats is a 48 y.o. 1965 female and presents with Breast Cancer    CC  Triple neg right breast cancer 7/16    HPI  Pt seen today in office for 5 mo f/u triple negative breast cancer not on any therapy from us. hx right mastectomy / reconstruction Roxy flap.   mammo good 6/18. Pt has left upper back burning pain for several weeks. No masses/ redness. Has been doing well. No records in our system. Pt went to PCP and had CXR and has a goiter. No recent labs. DX   Encounter Diagnoses   Name Primary?  History of breast cancer Yes    H/O mastectomy, right     History of reconstruction of both breasts     Thyroid enlargement     Goiter     Acute left-sided thoracic back pain         STAGE: T2N1 clinically triple neg    TREATMENT COURSE:  Biopsy only  Neoadjuvant chemo carbo/ taxol, DD AC. Right mastectomy/ reconstruction. Then radiation.     Past Medical History:   Diagnosis Date    Atopic dermatitis     Breast cancer, right ([address] 07/2016    Pleurisy 2006    Thyroid nodule     benign     Past Surgical History:   Procedure Laterality Date    HX BREAST BIOPSY Right 2016    HX BREAST RECONSTRUCTION Right 2/23/2017    RIGHT BREAST RECONSTRUCTION  /  TISSUE EXPANDER / ALLODERM  performed by Irma Sharif [facility] at [facility] AM[facility]    HX BREAST REDUCTION Left 2017    post mastectomy contrlateral breast    HX GYN  2002    conization    HX MASTECTOMY Right 2/23/2017    RIGHT BREAST TOTAL MASTECTOMY, AXILLARY LYMPH NODE DISSECTION, PORTACATH REMOVAL performed by Timmie Aase, MD at [address] [place] [address]. HX VASCULAR ACCESS  2016    portacath insertion and removal     Social History     Social History    Marital status:      Spouse name: N/A    Number of children: N/A    Years of education: N/A     Social History Main Topics    Smoking status: Never Smoker    Smokeless tobacco: Never Used    Alcohol use No    Drug use: No    Sexual activity: Not Asked     Other Topics Concern    None     Social History Narrative     Family History   Problem Relation Age of Onset    Colon Cancer Mother 39    Colon Cancer Paternal Uncle 79    Colon Cancer Paternal Uncle 79     No Known Allergies    Review of Systems    A comprehensive review of systems was negative except for: per HPI     Objective:  Visit Vitals    /90    Pulse 77    Temp 98 °F (36.7 °C) (Oral)    Resp 16    Ht 5' 6\" (1.676 m)    Wt 207 lb (93.9 kg)    LMP 06/24/2016    SpO2 98%    BMI 33.41 kg/m2         Physical Exam:   General appearance - alert, well appearing, and in no distress, oriented to person, place, and time and overweight  Mental status - alert, oriented to person, place, and time, normal mood, behavior, speech, dress, motor activity, and thought processes  EYE-conj clear  Mouth - mucous membranes moist, pharynx normal. No lesions or thrush. Neck - supple with diffuse thyroid fullness,greater on right   Chest - clear to auscultation bilaterally  Heart - regular rate and rhythm  Abdomen - round, soft, nontender  Ext-no pedal edema noted  Skin-Warm and dry. M/S discomfort high left back near shoulder blade  Neuro -alert, oriented, normal speech, no focal findings or movement disorder noted  Breasts - right mastectomy/ reconstruction healed well/ left breast without palpable masses. Diagnostic Imaging   reviewed  Results for orders placed during the hospital encounter of 05/08/17   CTA ABDOMEN PELV W CONT    Narrative EXAM:  CTA ABDOMEN PELV W CONT   INDICATION: Right breast cancer. DIEA protocol. COMPARISON: None. CONTRAST: 100 mL of Isovue-370. TECHNIQUE:   Multislice helical CT arteriography was performed from the diaphragm to the  symphysis pubis during uneventful rapid bolus intravenous contrast  administration. No oral contrast was administered.  Contiguous 2.5 mm axial  images were reconstructed and lung and soft tissue windows were generated. Coronal and sagittal reformations and 3-D shaded surface display renderings were  generated. CT dose reduction was achieved through use of a standardized protocol  tailored for this examination and automatic exposure control for dose  modulation. FINDINGS:   The abdominal aorta tapers normally without evidence of aneurysm. The celiac,  superior mesenteric and renal arteries are patent without stenosis. There is a  single renal artery to each kidney. The inferior mesenteric artery is patent. The common iliac and external iliac arteries are patent. The descending thoracic aorta measures 2.1 cm. The abdominal aorta at the level  of the renal arteries measures 2.6 cm. The common iliac arteries each measure  8-9 mm. The external iliac and common femoral arteries measure 7 mm. Right inferior epigastric artery: The proximal right deep inferior epigastric  artery measures 2-3 mm in diameter. The vessel enters the rectus muscle near the  level of the iliac crest. There is a large branch to the lateral subcutaneous  fat approximately 3.8 cm below the umbilicus. Left inferior epigastric artery: The proximal left deep inferior epigastric  artery measures 2-3 mm in diameter. The vessel runs in the posterior rectus  sheath. There are large branches to the lateral subcutaneous fat at the level of  the umbilicus and approximately 3.4 cm below the level of the umbilicus. The visualized portions of the lung bases are clear. There are no focal abnormalities within the liver, spleen, pancreas, adrenal  glands or kidneys. There is no retroperitoneal adenopathy or mass. The bowel is normal. The appendix is is normal  There is no ascites or free  intraperitoneal air. The uterus is normal.  The bladder is normal. There is no pelvic mass or  adenopathy. The bones and soft tissues are within normal limits. Impression IMPRESSION: Deep inferior epigastric arteries as described above.  No acute  abdominal or pelvic abnormality. Lab Results  reviewed  Lab Results   Component Value Date/Time    WBC 4.5 02/19/2018 01:00 PM    HGB 12.2 02/19/2018 01:00 PM    HCT 40.0 02/19/2018 01:00 PM    PLATELET 448 14/52/6623 01:00 PM    MCV 79 02/19/2018 01:00 PM       Lab Results   Component Value Date/Time    Sodium 141 02/19/2018 01:00 PM    Potassium 4.2 02/19/2018 01:00 PM    Chloride 101 02/19/2018 01:00 PM    CO2 25 02/19/2018 01:00 PM    Anion gap 7 02/10/2017 08:43 AM    Glucose 81 02/19/2018 01:00 PM    BUN 10 02/19/2018 01:00 PM    Creatinine 0.72 02/19/2018 01:00 PM    BUN/Creatinine ratio 14 02/19/2018 01:00 PM    GFR est  02/19/2018 01:00 PM    GFR est non-AA 97 02/19/2018 01:00 PM    Calcium 9.6 02/19/2018 01:00 PM    AST (SGOT) 15 02/19/2018 01:00 PM    Alk. phosphatase 90 02/19/2018 01:00 PM    Protein, total 6.5 02/19/2018 01:00 PM    Albumin 4.2 02/19/2018 01:00 PM    Globulin 3.1 02/10/2017 08:43 AM    A-G Ratio 1.8 02/19/2018 01:00 PM    ALT (SGPT) 15 02/19/2018 01:00 PM       Assessment/Plan:    1. T2N1 right triple negative breast cancer   completed neoadjuvant chemotherapy with Carbo/Taxol followed by DD AC on 12/16/16. Had right mastectomy/ reconstruction. Pt today has burning left upper back pain for a few weeks. Pt is concerned about this. No CP/ SOB. Had CXR with PCP and states ok. Will do CT chest for further eval.   mammo good 6/18. Labs routine ordered today. Pt is good with this plan. 2.  Burning left upper back pain. CT chest for eval.  Monitor symptoms. 3.  Known goiter. Seeing endocrine. F/u here in 6 months or sooner if needed  Call if questions. ICD-10-CM ICD-9-CM    1. History of breast cancer Z85.3 V10.3 CBC WITH AUTOMATED DIFF      METABOLIC PANEL, COMPREHENSIVE      TSH 3RD GENERATION      CT CHEST W WO CONT   2.  H/O mastectomy, right Z90.11 V45.71 CBC WITH AUTOMATED DIFF      METABOLIC PANEL, COMPREHENSIVE      TSH 3RD GENERATION      CT CHEST W WO CONT   3. History of reconstruction of both breasts Z98.890 V45.89 CBC WITH AUTOMATED DIFF      METABOLIC PANEL, COMPREHENSIVE      TSH 3RD GENERATION      CT CHEST W WO CONT   4. Thyroid enlargement E04.9 240.9 CBC WITH AUTOMATED DIFF      METABOLIC PANEL, COMPREHENSIVE      TSH 3RD GENERATION      CT CHEST W WO CONT   5. Goiter E04.9 240.9 CBC WITH AUTOMATED DIFF      METABOLIC PANEL, COMPREHENSIVE      TSH 3RD GENERATION      CT CHEST W WO CONT   6. Acute left-sided thoracic back pain M54.6 724.1 CT CHEST W WO CONT       Current Outpatient Prescriptions   Medication Sig    cholecalciferol (VITAMIN D3) 1,000 unit cap Take  by mouth daily.  magnesium 250 mg tab Take  by mouth daily.  cefadroxil (DURICEF) 500 mg capsule      No current facility-administered medications for this visit. continue present plan, call if any problems  There are no Patient Instructions on file for this visit.    Follow-up Disposition: Not on [address], DO

## 2018-07-04 LAB
ALBUMIN SERPL-MCNC: 4.1 G/DL (ref 3.5–5.5)
ALBUMIN/GLOB SERPL: 1.7 {RATIO} (ref 1.2–2.2)
ALP SERPL-CCNC: 80 IU/L (ref 39–117)
ALT SERPL-CCNC: 16 IU/L (ref 0–32)
AST SERPL-CCNC: 15 IU/L (ref 0–40)
BASOPHILS # BLD AUTO: 0 X10E3/UL (ref 0–0.2)
BASOPHILS NFR BLD AUTO: 0 %
BILIRUB SERPL-MCNC: 0.5 MG/DL (ref 0–1.2)
BUN SERPL-MCNC: 9 MG/DL (ref 6–24)
BUN/CREAT SERPL: 11 (ref 9–23)
CALCIUM SERPL-MCNC: 9.8 MG/DL (ref 8.7–10.2)
CHLORIDE SERPL-SCNC: 100 MMOL/L (ref 96–106)
CO2 SERPL-SCNC: 27 MMOL/L (ref 20–29)
CREAT SERPL-MCNC: 0.85 MG/DL (ref 0.57–1)
EOSINOPHIL # BLD AUTO: 0.1 X10E3/UL (ref 0–0.4)
EOSINOPHIL NFR BLD AUTO: 2 %
ERYTHROCYTE [DISTWIDTH] IN BLOOD BY AUTOMATED COUNT: 17 % (ref 12.3–15.4)
GLOBULIN SER CALC-MCNC: 2.4 G/DL (ref 1.5–4.5)
GLUCOSE SERPL-MCNC: 81 MG/DL (ref 65–99)
HCT VFR BLD AUTO: 42.2 % (ref 34–46.6)
HGB BLD-MCNC: 13.9 G/DL (ref 11.1–15.9)
IMM GRANULOCYTES # BLD: 0 X10E3/UL (ref 0–0.1)
IMM GRANULOCYTES NFR BLD: 0 %
LYMPHOCYTES # BLD AUTO: 1.9 X10E3/UL (ref 0.7–3.1)
LYMPHOCYTES NFR BLD AUTO: 48 %
MCH RBC QN AUTO: 26.2 PG (ref 26.6–33)
MCHC RBC AUTO-ENTMCNC: 32.9 G/DL (ref 31.5–35.7)
MCV RBC AUTO: 80 FL (ref 79–97)
MONOCYTES # BLD AUTO: 0.3 X10E3/UL (ref 0.1–0.9)
MONOCYTES NFR BLD AUTO: 7 %
NEUTROPHILS # BLD AUTO: 1.8 X10E3/UL (ref 1.4–7)
NEUTROPHILS NFR BLD AUTO: 43 %
PLATELET # BLD AUTO: 206 X10E3/UL (ref 150–379)
POTASSIUM SERPL-SCNC: 4.2 MMOL/L (ref 3.5–5.2)
PROT SERPL-MCNC: 6.5 G/DL (ref 6–8.5)
RBC # BLD AUTO: 5.31 X10E6/UL (ref 3.77–5.28)
SODIUM SERPL-SCNC: 140 MMOL/L (ref 134–144)
TSH SERPL DL<=0.005 MIU/L-ACNC: 1.44 UIU/ML (ref 0.45–4.5)
WBC # BLD AUTO: 4.1 X10E3/UL (ref 3.4–10.8)

## 2018-07-13 ENCOUNTER — HOSPITAL ENCOUNTER (OUTPATIENT)
Dept: CT IMAGING | Age: 53
Discharge: HOME | End: 2018-07-13
Attending: INTERNAL MEDICINE
Payer: COMMERCIAL

## 2018-07-13 DIAGNOSIS — Z98.890 HISTORY OF RECONSTRUCTION OF BOTH BREASTS: ICD-10-CM

## 2018-07-13 DIAGNOSIS — Z90.11 H/O MASTECTOMY, RIGHT: ICD-10-CM

## 2018-07-13 DIAGNOSIS — E04.9 GOITER: ICD-10-CM

## 2018-07-13 DIAGNOSIS — Z85.3 HISTORY OF BREAST CANCER: ICD-10-CM

## 2018-07-13 DIAGNOSIS — M54.6 ACUTE LEFT-SIDED THORACIC BACK PAIN: ICD-10-CM

## 2018-07-13 DIAGNOSIS — E04.9 THYROID ENLARGEMENT: ICD-10-CM

## 2018-07-13 PROCEDURE — 74011000258 HC RX REV CODE- 258: Performed by: INTERNAL MEDICINE

## 2018-07-13 PROCEDURE — 74011636320 HC RX REV CODE- 636/320: Performed by: INTERNAL MEDICINE

## 2018-07-13 PROCEDURE — 71260 CT THORAX DX C+: CPT

## 2018-07-13 RX ORDER — SODIUM CHLORIDE 0.9 % (FLUSH) 0.9 %
10 SYRINGE (ML) INJECTION
Status: COMPLETED | OUTPATIENT
Start: 2018-07-13 | End: 2018-07-13

## 2018-07-13 RX ADMIN — IOPAMIDOL 100 ML: 612 INJECTION, SOLUTION INTRAVENOUS at 09:13

## 2018-07-13 RX ADMIN — Medication 10 ML: at 09:13

## 2018-07-13 RX ADMIN — SODIUM CHLORIDE 100 ML: 900 INJECTION, SOLUTION INTRAVENOUS at 09:13

## 2018-07-13 NOTE — PROGRESS NOTES
Received return call from patient. Advised of provider note. Verbalized understanding. She is asking what to do about burning sensation to upper back. Advised update would be sent to provider, will contact her with recommendations. She requests message to be sent via [employer].

## 2019-06-24 ENCOUNTER — TELEPHONE (OUTPATIENT)
Dept: ONCOLOGY | Age: 54
End: 2019-06-24

## 2019-06-24 NOTE — TELEPHONE ENCOUNTER
Call made to patient she stated she has a appointment with her surguron Monday 07/01 and she would call back and scheduled after she talks with dr Gretchen Hsu

## 2019-06-24 NOTE — TELEPHONE ENCOUNTER
6/24/2019:     1320: called patient per NP Ulysses quintanilla  Patient answered and provided identifiers x2   Informed patient to contact Breast Surgeon/Plastics and inform and follow-up with them. Also informed patient that she will need to schedule a follow-up appointment with Gerda Shaikh MD sometime in next few weeks.      Patient verbalized understanding; no new questions, concerns, or symptoms reported

## 2019-06-24 NOTE — TELEPHONE ENCOUNTER
Please advise patient to follow up with Breast Surgeon/Plastics. Also, patient was last seen here 7/18 and was to follow up in 6 months so she needs a follow up appointment with Dr. Michael Shabazz within the next couple of weeks.

## 2019-07-01 ENCOUNTER — TELEPHONE (OUTPATIENT)
Dept: SURGERY | Age: 54
End: 2019-07-01

## 2019-07-01 ENCOUNTER — OFFICE VISIT (OUTPATIENT)
Dept: SURGERY | Age: 54
End: 2019-07-01

## 2019-07-01 VITALS
SYSTOLIC BLOOD PRESSURE: 139 MMHG | HEART RATE: 70 BPM | BODY MASS INDEX: 33.27 KG/M2 | HEIGHT: 66 IN | DIASTOLIC BLOOD PRESSURE: 86 MMHG | WEIGHT: 207 LBS

## 2019-07-01 DIAGNOSIS — N63.0 BREAST MASS IN FEMALE: Primary | ICD-10-CM

## 2019-07-01 DIAGNOSIS — Z85.3 HISTORY OF RIGHT BREAST CANCER: ICD-10-CM

## 2019-07-01 NOTE — PROGRESS NOTES
HISTORY OF PRESENT ILLNESS  Juani Luna is a 47 y.o. female. HPI ESTABLISHED patient here for RIGHT breast lump x 1 week. No other abnormal symptoms to report. History of RIGHT breast cancer in 2017.     History of breast cancer-  RIGHT breast IDC triple negative. Completed neoadjuvant chemotherapy. Followed by Dr. Ashley Archer. 2/23/17- RIGHT mastectomy, ALND, tissue expander placed by Dr. Bela Reyna. 0/15 + LN.  3/30/17- evacuation of RIGHT seroma by Dr. Bela Reyna. Declined radiation. Seen by. Dr. Abdiel Sidhu.   8/9/17- scheduled for NANCY flap with balancing LEFT breast reduction with Dr. Bela Reyna. Breast imaging-  LEFT breast mammogram 6/2018: BI-RADS 2    Review of Systems   All other systems reviewed and are negative. Physical Exam   Pulmonary/Chest: Right breast exhibits mass (2 x 3 cm soft mobile mass 12:00 ). Right breast exhibits no inverted nipple, no nipple discharge, no skin change and no tenderness. Left breast exhibits no inverted nipple, no mass, no nipple discharge, no skin change and no tenderness. Breasts are symmetrical.       Lymphadenopathy:     She has no cervical adenopathy. She has no axillary adenopathy. Nursing note and vitals reviewed. BREAST ULTRASOUND  Indication: Right  breast mass 12:00   Technique: The area was scanned using a high-frequency linear-array near-field transducer  Findings: No abnormal mass, lesion, or shadowing noted. No cysts  Impression: Normal dense fibrocystic breast tissue   Disposition: No identifiable mass on ultrasound  ASSESSMENT and PLAN    ICD-10-CM ICD-9-CM    1. Breast mass in female N63.0 611.72 MRI BREAST BI W WO CONT   2. History of right breast cancer Z85.3 V10.3 MRI BREAST BI W WO CONT      SKIP 3D KAMALA W MAMMO LT SCREENING INCL CAD     - patient with new mass  Nothing on us identified even though mass present  Will schedule breast mri  Maybe fat necrosis.

## 2019-07-01 NOTE — PATIENT INSTRUCTIONS

## 2019-07-01 NOTE — TELEPHONE ENCOUNTER
Patient calling for clarification of order. Patient needs breast MRI (scheduled for 7/10/19) and LEFT screening mammogram.   She will call scheduling back to let them know what she needs scheduled.

## 2019-07-03 ENCOUNTER — DOCUMENTATION ONLY (OUTPATIENT)
Dept: SURGERY | Age: 54
End: 2019-07-03

## 2019-07-03 NOTE — PROGRESS NOTES
Patient was inadvertantly scheduled for breast mri at [facility]. This was done by [facility] Chew outside of our \"team.\"  Bunny De Dios, from our [address] Team, called the patient to offer her an appointment date for a different location, however the patient refused because the already scheduled date of 7/10/19 works best for her.

## 2019-07-09 ENCOUNTER — HOSPITAL ENCOUNTER (OUTPATIENT)
Dept: MAMMOGRAPHY | Age: 54
Discharge: HOME | End: 2019-07-09
Attending: SURGERY
Payer: COMMERCIAL

## 2019-07-09 DIAGNOSIS — Z85.3 HISTORY OF RIGHT BREAST CANCER: ICD-10-CM

## 2019-07-09 PROCEDURE — 77063 BREAST TOMOSYNTHESIS BI: CPT

## 2019-07-24 ENCOUNTER — HOSPITAL ENCOUNTER (OUTPATIENT)
Dept: MRI IMAGING | Age: 54
Discharge: HOME | End: 2019-07-24
Attending: SURGERY
Payer: COMMERCIAL

## 2019-07-24 DIAGNOSIS — N63.0 BREAST MASS IN FEMALE: ICD-10-CM

## 2019-07-24 DIAGNOSIS — Z85.3 HISTORY OF RIGHT BREAST CANCER: ICD-10-CM

## 2019-07-24 PROCEDURE — 77049 MRI BREAST C-+ W/CAD BI: CPT

## 2019-07-24 PROCEDURE — 74011250636 HC RX REV CODE- 250/636: Performed by: SURGERY

## 2019-07-24 PROCEDURE — A9585 GADOBUTROL INJECTION: HCPCS | Performed by: SURGERY

## 2019-07-24 RX ADMIN — GADOBUTROL 10 ML: 604.72 INJECTION INTRAVENOUS at 13:00

## 2019-07-26 NOTE — TELEPHONE ENCOUNTER
Completed patient scheduled 07/30 at 10 Progress Note    Red Rule Applied    Service Provided:  I met with Des Farris today for 53 minutes for individual therapy.     Relevant History and Session Note:  Des Ramirez presented today reporting on recent changes at work, as well as improved performance.  Des reported that he has been functioning well and has created a budget and is being more financially responsible.  He discussed his plans to take 3 courses for the next semester, and to continue working 1-2 jobs.  Des also discussed a co-worker that he has had conflicts with in the past, and how he has become more mature and assertiveness in his dealings with her.    Progress relevant to last session: stable    Interventions: Therapist took supportive therapy approach, providing a safe and validating holding environment. and Therapist took person-centered approach, providing empathy and unconditional positive-regard as it relates to work performance, responsibility, and interpersonal effectiveness.    Review of Systems    Assessment:  Des Ramirez presented today as:   Behavior: cooperative  Eye Contact: limited eye contact  Attention:  somewhat distracted  Gait, movement and Motor Coordination: Within normal limits  Alert and Oriented: Yes, to Person, Place, and Time  Mood/Affect: euthymic, anxious  Self-Harm: not at significant or imminent risk  Current Suicidal Ideation: Denies suicidal ideation, plan, or intent.  Homicidal Ideation: Denies homicidal ideation, plan, or intent.    Diagnosis:  ADHD  Anxiety NOS  Autism, mild     Current Outpatient Prescriptions   Medication Sig   • escitalopram (LEXAPRO) 10 MG tablet TK 1/2 T PO QD FOR ONE WEEK THEN 1 T PO QD   • Dexmethylphenidate HCl (FOCALIN OR) Take  by mouth       Plan:  Meet in 3 weeks     Thank you for the opportunity to participate in the care of Des Ramirez. Please feel free to contact me should you have any questions about my recommendations.    Electronically Signed by: Anson Lee  TYE, 7/26/2019  Licensed Clinical Psychologist  [facility]

## 2019-08-01 ENCOUNTER — TELEPHONE (OUTPATIENT)
Dept: MAMMOGRAPHY | Age: 54
End: 2019-08-01

## 2019-08-01 NOTE — TELEPHONE ENCOUNTER
Updated radiant from MRI report - Dr. Santana Jensen gave recommendation for mammo follow up - letter sent. There was a duplicate mammo reminder so just let mammo's one in there.

## 2020-04-06 ENCOUNTER — TELEPHONE (OUTPATIENT)
Dept: ONCOLOGY | Age: 55
End: 2020-04-06

## 2020-04-06 NOTE — TELEPHONE ENCOUNTER
Patient called and would like to know if she can receive a work note. 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 Expressed concern about exposure because she is immunocompromised.       [phone]

## 2020-04-06 NOTE — TELEPHONE ENCOUNTER
Call to patient, HIPAA verified. Informed patient that Provider is unable to write her a note stating that she is immunocompromised at this time as she is not on active treatment for her history of cancer and her last lab work was performed in 2018. Patient verbalized understanding, stated that her work, though, needed documentation from Provider that patient has a history of cancer and treatment, as they are prioritizing staffing at work. Letter drafted and submitted to provider for signature. Patient requests mail to home. Gentle reminder to patent that she is overdue for F/U with MD, to please contact MD office for Virtual Visit scheduling. Understanding verbalized.

## 2020-07-28 ENCOUNTER — HOSPITAL ENCOUNTER (OUTPATIENT)
Dept: MAMMOGRAPHY | Age: 55
Discharge: HOME | End: 2020-07-28
Attending: INTERNAL MEDICINE
Payer: COMMERCIAL

## 2020-07-28 DIAGNOSIS — Z85.3 HX: BREAST CANCER: ICD-10-CM

## 2020-07-28 PROCEDURE — 77067 SCR MAMMO BI INCL CAD: CPT

## 2020-09-22 NOTE — PROGRESS NOTES
HISTORY OF PRESENT ILLNESS  Malka Baires is a 54 y.o. female. HPI  ESTABLISHED patient here for RIGHT breast lump, history of RIGHT breast cancer, s/p mastectomy. Has a new lump to her RIGHT breast. Having pain to the area. .  It is a lingering pain. History of breast cancer-  RIGHT breast IDC triple negative. Completed neoadjuvant chemotherapy. Followed by Dr. Colin Alfonso. 2/23/17- RIGHT mastectomy, ALND, tissue expander placed by Dr. Kamron Kruse. 0/15 + LN.  3/30/17- evacuation of RIGHT seroma by Dr. Kamron Kruse. Declined radiation. Seen by. Dr. Tavon Saxena.   8/9/17- scheduled for NANCY flap with balancing LEFT breast reduction with Dr. Liza Leblanc    [facility] Results (most recent):  Results from [place] encounter on 07/28/20   [facility] MAMMO LT SCREENING INCL CAD    Narrative STUDY: Left unilateral digital screening mammogram    INDICATION:  Screening. COMPARISON: 7/9/2019 through 9/20/2012    BREAST COMPOSITION:  The breasts are heterogeneously dense, which may obscure  small masses. FINDINGS: Left unilateral digital screening mammography was performed and is  interpreted in conjunction with a computer assisted detection (CAD) system. No  suspicious masses or calcifications are identified. There has been no  significant change. Impression IMPRESSION:  BI-RADS 1: Negative. No mammographic evidence of malignancy. RECOMMENDATIONS:  Next screening mammogram is recommended in one year. The patient will be notified of these results. Review of Systems   All other systems reviewed and are negative. Physical Exam  Vitals signs and nursing note reviewed. Chest:          Comments: Right nancy 12:00 soft mobile mass-like area  2 x 3 cm  No other masses  Area correlates with area before    Lymphadenopathy:      Upper Body:      Right upper body: No supraclavicular, axillary or pectoral adenopathy. Left upper body: No supraclavicular, axillary or pectoral adenopathy.        BREAST ULTRASOUND  Indication: Right  breast mass 12:00 on rakesh edge  Technique: The area was scanned using a high-frequency linear-array near-field transducer  Findings: No abnormal mass, lesion, or shadowing noted. No cysts  Impression: Normal dense fibrocystic breast tissue   Disposition: No worrisome finding on ultrasound  ASSESSMENT and PLAN    ICD-10-CM ICD-9-CM    1. History of right breast cancer  Z85.3 V10.3    2. History of right mastectomy  Z90.11 V45.71    3. Breast mass, right  N63.10 611.72    4. Fat necrosis (segmental) of breast  N64.1 611.3      - same area as before, fat necrosis on mri  Nothing on us today  Pain from chest wall recommend heat, nsaids. She has been lifting recently. F/u in 1 year no local recurrence.

## 2020-09-23 ENCOUNTER — OFFICE VISIT (OUTPATIENT)
Dept: SURGERY | Age: 55
End: 2020-09-23
Payer: COMMERCIAL

## 2020-09-23 VITALS
HEIGHT: 66 IN | HEART RATE: 83 BPM | WEIGHT: 207 LBS | TEMPERATURE: 97.3 F | DIASTOLIC BLOOD PRESSURE: 94 MMHG | BODY MASS INDEX: 33.27 KG/M2 | SYSTOLIC BLOOD PRESSURE: 146 MMHG

## 2020-09-23 DIAGNOSIS — N63.10 BREAST MASS, RIGHT: ICD-10-CM

## 2020-09-23 DIAGNOSIS — Z85.3 HISTORY OF RIGHT BREAST CANCER: Primary | ICD-10-CM

## 2020-09-23 DIAGNOSIS — Z90.11 HISTORY OF RIGHT MASTECTOMY: ICD-10-CM

## 2020-09-23 DIAGNOSIS — N64.1 FAT NECROSIS (SEGMENTAL) OF BREAST: ICD-10-CM

## 2020-09-23 PROCEDURE — 99213 OFFICE O/P EST LOW 20 MIN: CPT | Performed by: SURGERY

## 2020-09-23 PROCEDURE — 76642 ULTRASOUND BREAST LIMITED: CPT | Performed by: SURGERY

## 2020-09-23 NOTE — LETTER
9/23/20 Patient: Jesus Shook YOB: 1965 Date of Visit: 9/23/2020 Jaret Home, MD 
[place] [id number] VIA Facsimile: [phone] Dear Jaret Smith MD, Thank you for referring Ms. Legrand Najjar to [address] MAIN OFFICE SUITE [facility] for evaluation. My notes for this consultation are attached. If you have questions, please do not hesitate to call me. I look forward to following your patient along with you. Sincerely, Caitlin Nuñez MD

## 2020-09-23 NOTE — PATIENT INSTRUCTIONS

## 2020-11-13 NOTE — TELEPHONE ENCOUNTER
History and Physical    Patient: Neel Deshpande MRN: [id number]  SSN: [id number]    YOB: 1952  Age: 76 y.o. Sex: male      Subjective:      Chief Complaint:  Fall at nursing home   HPI: Neel Deshpande is a 76 y.o. male who known case of atrial fibrillation, hypertension, dysphagia on nectar thick liquids, on anticoagulation with Eliquis had a ground-level fall at nursing home millimeter laceration of forehead. Laceration was apposed with Dermabond. Patient was also noticed to have atrial fibrillation with rapid ventricular rate and was started on Cardizem drip in ER. Along with this his sodium was elevated and BUN/creatinine was also elevated. Following this hospital service was requested to admit the patient for further work-up and management. 11/13: pleasantly confused, poor recollection of recent admit though states \"sounds familiar\"  Hr settled down some overnight, picking up this aftermoon, cardio noted, digoxin loaded. 11/12: alert, pleasantly confused it appears, poor recollection of recent events/confabulating? Prior to Admission medications    Medication Sig Start Date End Date Taking? Authorizing Provider   amoxicillin-clavulanate (AUGMENTIN) 875-125 mg per tablet Take 1 Tab by mouth every twelve (12) hours for 7 days. 11/9/20 11/16/20  Bella Ta MD   apixaban (ELIQUIS) 5 mg tablet Take 1 Tab by mouth two (2) times a day for 30 days. 11/9/20 12/9/20  Bella Ta MD   dilTIAZem IR (CARDIZEM) 60 mg tablet Take 1 Tab by mouth every six (6) hours for 30 days. 11/9/20 12/9/20  Bella Ta MD   furosemide (LASIX) 40 mg tablet Take 1 Tab by mouth daily for 30 days. 11/9/20 12/9/20  Bella Ta MD   lisinopriL (PRINIVIL, ZESTRIL) 10 mg tablet Take 1 Tab by mouth daily for 30 days. 11/9/20 12/9/20  Bella Ta MD   potassium chloride (K-DUR, KLOR-CON) 20 mEq tablet Take 1 Tab by mouth daily for 14 days.  11/10/20 11/24/20 Patient called and stated that she has previously had reconstruction of her right breast. She just recently found a lump in that same breast and would like to know what her next step should be.    [phone] Joy Barclay MD        No Known Allergies    Review of Systems:  Review of Systems   Constitutional: Positive for malaise/fatigue. Negative for chills and fever. HENT: Negative. Eyes: Negative. Respiratory: Negative. Cardiovascular: Positive for palpitations. Gastrointestinal: Negative. Genitourinary: Negative. Musculoskeletal: Negative. Skin: Negative. Neurological: Negative. Endo/Heme/Allergies: Negative. Psychiatric/Behavioral: Negative. Objective:     Vitals:    11/13/20 0835 11/13/20 1200 11/13/20 1519 11/13/20 1539   BP: 112/71  129/78    Pulse: (!) 140 (!) 145 73    Resp: 18  18    Temp: 97.1 °F (36.2 °C)  97.9 °F (36.6 °C)    SpO2: 95%  93%    Weight:       Height:    6' (1.829 m)        Physical Exam:  General: Alert, he is speaking clearly, pleasantly confused  HEAD: Laceration at angle of nose and forehead has been apposed with Dermabond  Eye: Left eeye deviates outward- chronic  Throat and Neck: normal and no erythema or exudates noted. No mass   Lung: Clear to auscultation bilaterally without wheezes, rhonchi. Good air movement bilaterally. Heart: Irregularly irregular tachycardic.   Abdomen: soft, non-tender. Bowel sounds present in all four quadrants. No masses appreciated. Extremities:  able to move all extremities normal, atraumatic  Skin: Clean, dry and intact without appreciated lesions. Neurologic: Patient is awake, he follows commands, patient is disoriented.   Psychiatric: non focal, normal affect, poor recollection recent events    CBC:   Lab Results   Component Value Date/Time    WBC 9.1 11/13/2020 03:54 PM    RBC 4.01 (L) 11/13/2020 03:54 PM    HGB 14.2 11/13/2020 03:54 PM    HCT 41.6 11/13/2020 03:54 PM    PLATELET 133 (L) 66/24/7963 03:54 PM     CMP:   Lab Results   Component Value Date/Time    Glucose 88 11/13/2020 06:58 AM    Sodium 154 (H) 11/13/2020 06:58 AM    Potassium 3.7 11/13/2020 06:58 AM    Chloride 126 (H) 11/13/2020 06:58 AM CO2 24 11/13/2020 06:58 AM    BUN 46 (H) 11/13/2020 06:58 AM    Creatinine 1.14 11/13/2020 06:58 AM    Calcium 10.3 (H) 11/13/2020 06:58 AM    Anion gap 4 (L) 11/13/2020 06:58 AM    BUN/Creatinine ratio 40 (H) 11/13/2020 06:58 AM    Alk. phosphatase 132 (H) 11/12/2020 03:47 AM    Protein, total 7.7 11/12/2020 03:47 AM    Albumin 2.5 (L) 11/12/2020 03:47 AM    Globulin 5.2 (H) 11/12/2020 03:47 AM    A-G Ratio 0.5 (L) 11/12/2020 03:47 AM     Coagulation:   Lab Results   Component Value Date/Time    Prothrombin time 17.8 (H) 11/11/2020 04:57 AM    INR 1.5 (H) 11/11/2020 04:57 AM    aPTT 35.2 11/11/2020 04:57 AM     Radiology review:   CT SPINE CERV WO CONT   Final Result   Impression:      No acute fracture. Follow-up as clinically indicated. CT HEAD WO CONT   Final Result   Impression:      No acute intracranial bleed. Follow-up as clinically indicated. XR CHEST SNGL V   Final Result   IMPRESSION: Stable enlargement of cardiopericardial silhouette. Calcified aortic   knob. Right ventricular pacing lead. Near resolution of left basilar parenchymal   opacity compared with previous. No new findings. Assessment:     Active Problems:    Atrial fibrillation with RVR ([address] (11/11/2020)      Acute renal failure (ARF) ([address] (11/11/2020)      Hypernatremia (11/11/2020)      Fall (11/11/2020)      Laceration of forehead (11/11/2020)      Afib ([address] (11/12/2020)         Plan:     80-year-old male was brought to the hospital after he had a fall and laceration to his forearm. 1.  Laceration to forehead: This have been apposed with Dermabond. Patient denies any other injuries. CT head is negative for any acute intracranial bleed. 2.  Atrial Fibrillation with rapid ventricular rate: Patient was on Cardizem 60 mg at home. dilt drip off, cardio appreciated, loading digoxin. Continue eliquis. Continue metoprolol. 3.  Hypernatremia: Likely due to dehydration. Switch to 0.2ns, follow. Unchanged at 156  4. Acute renal failure:improved, cont judicious hydration  Holding Lasix for now. 5.  Dysphagia: Speech evaluatio. Patient was on nectar thick liquid diet will continue that for now.   Patient is DO NOT RESUSCITATE  Vtep: Eliquis  Further plan of management depends on patient's clinical course in the hospital    Signed By: Awilda Hussein MD     November 13, 2020

## 2021-01-26 NOTE — PROGRESS NOTES
Message left with Rad Onc re:  Referral.  Patient given contact info/referral to make appt. Will call our office for any questions. Suprep Pending    Insurance response  Prescription Drug Insurance: [employer]  Notes: Prior authorization submitted - will update provider when decision has been made by insurance.

## 2021-07-09 ENCOUNTER — TRANSCRIBE ORDER (OUTPATIENT)
Dept: SCHEDULING | Age: 56
End: 2021-07-09

## 2021-07-09 DIAGNOSIS — Z12.31 VISIT FOR SCREENING MAMMOGRAM: Primary | ICD-10-CM

## 2021-08-11 ENCOUNTER — HOSPITAL ENCOUNTER (OUTPATIENT)
Dept: MAMMOGRAPHY | Age: 56
Discharge: HOME | End: 2021-08-11
Attending: INTERNAL MEDICINE
Payer: COMMERCIAL

## 2021-08-11 DIAGNOSIS — Z12.31 VISIT FOR SCREENING MAMMOGRAM: ICD-10-CM

## 2021-08-11 PROCEDURE — 77067 SCR MAMMO BI INCL CAD: CPT

## 2021-08-24 DIAGNOSIS — Z12.39 SCREENING BREAST EXAMINATION: Primary | ICD-10-CM

## 2021-11-01 ENCOUNTER — OFFICE VISIT (OUTPATIENT)
Dept: SURGERY | Age: 56
End: 2021-11-01
Payer: COMMERCIAL

## 2021-11-01 VITALS — HEART RATE: 88 BPM | SYSTOLIC BLOOD PRESSURE: 136 MMHG | DIASTOLIC BLOOD PRESSURE: 88 MMHG

## 2021-11-01 DIAGNOSIS — Z90.11 STATUS POST RIGHT MASTECTOMY: Primary | ICD-10-CM

## 2021-11-01 PROCEDURE — 76642 ULTRASOUND BREAST LIMITED: CPT | Performed by: SURGERY

## 2021-11-01 PROCEDURE — 99214 OFFICE O/P EST MOD 30 MIN: CPT | Performed by: SURGERY

## 2021-11-01 NOTE — PATIENT INSTRUCTIONS
Breast Cancer: Care Instructions Your Care Instructions Breast cancer occurs when abnormal cells grow out of control in the breast. These cancer cells can spread within the breast, to nearby lymph nodes and other tissues, and to other parts of the body. Being treated for cancer can weaken your body, and you may feel very tired. Get the rest your body needs so you can feel better. Finding out that you have cancer is scary. You may feel many emotions and may need some help coping. Seek out family, friends, and counselors for support. You also can do things at home to make yourself feel better while you go through treatment. Call the [employer] (7-871.483.9777) or visit its website at [employer] for more information. Follow-up care is a key part of your treatment and safety. Be sure to make and go to all appointments, and call your doctor if you are having problems. It's also a good idea to know your test results and keep a list of the medicines you take. How can you care for yourself at home? · Take your medicines exactly as prescribed. Call your doctor if you think you are having a problem with your medicine. You may get medicine for nausea and vomiting if you have these side effects. · Follow your doctor's instructions to relieve pain. Pain from cancer and surgery can almost always be controlled. Use pain medicine when you first notice pain, before it becomes severe. · Eat healthy food. If you do not feel like eating, try to eat food that has protein and extra calories to keep up your strength and prevent weight loss. Drink liquid meal replacements for extra calories and protein. Try to eat your main meal early. · Get some physical activity every day, but do not get too tired. Keep doing the hobbies you enjoy as your energy allows. · Do not smoke. Smoking can make your cancer worse. If you need help quitting, talk to your doctor about stop-smoking programs and medicines.  These can increase your chances of quitting for good. · Take steps to control your stress and workload. Learn relaxation techniques. ? Share your feelings. Stress and tension affect our emotions. By expressing your feelings to others, you may be able to understand and cope with them. ? Consider joining a support group. Talking about a problem with your spouse, a good friend, or other people with similar problems is a good way to reduce tension and stress. ? Express yourself through art. Try writing, crafts, dance, or art to relieve stress. Some dance, writing, or art groups may be available just for people who have cancer. ? Be kind to your body and mind. Getting enough sleep, eating a healthy diet, and taking time to do things you enjoy can contribute to an overall feeling of balance in your life and can help reduce stress. ? Get help if you need it. Discuss your concerns with your doctor or counselor. · If you are vomiting or have diarrhea: ? Drink plenty of fluids to prevent dehydration. Choose water and other clear liquids. If you have kidney, heart, or liver disease and have to limit fluids, talk with your doctor before you increase the amount of fluids you drink. ? When you are able to eat, try clear soups, mild foods, and liquids until all symptoms are gone for 12 to 48 hours. Other good choices include dry toast, crackers, cooked cereal, and gelatin dessert, such as Jell-O. · If you have not already done so, prepare a list of advance directives. Advance directives are instructions to your doctor and family members about what kind of care you want if you become unable to speak or express yourself. When should you call for help? Call 911 anytime you think you may need emergency care. For example, call if: 
  · You passed out (lost consciousness).   
Call your doctor now or seek immediate medical care if: 
  · You have a fever.  
  · You have abnormal bleeding.  
  · You think you have an infection.  
  · You have new or worse pain.  
  · You have new symptoms, such as a cough, belly pain, vomiting, diarrhea, or a rash. Watch closely for changes in your health, and be sure to contact your doctor if: 
  · You are much more tired than usual.  
  · You have swollen glands in your armpits, groin, or neck.  
  · You do not get better as expected. Where can you learn more? Go to http://www.ayala.com/ Enter V321 in the search box to learn more about \"Breast Cancer: Care Instructions. \" Current as of: December 17, 2020               Content Version: 13.0 © 4606-1081 [employer]. Care instructions adapted under license by [employer] (which disclaims liability or warranty for this information). If you have questions about a medical condition or this instruction, always ask your healthcare professional. [address] any warranty or liability for your use of this information.

## 2021-11-01 NOTE — PROGRESS NOTES
HISTORY OF PRESENT ILLNESS  Brittani Fraser is a 64 y.o. female. HPI  ESTABLISHED patient here for RIGHT breast pain. She has been having pain that comes and goes to her RIGHT breast.  It feels like the pain she had when diagnosed. She feels a lump/fat necrosis to her upper RIGHT breast.      History of breast cancer-  RIGHT breast IDC triple negative. Completed neoadjuvant chemotherapy. Followed by Dr. Lawrence Escamilla. 17- RIGHT mastectomy, ALND, tissue expander placed by Dr. Kyle Riley. 0/15 + LN.  3/30/17- evacuation of RIGHT seroma by Dr. Kyle Riley. Declined radiation. Seen by. Dr. Roma Yeboah.   17- scheduled for NANCY flap with balancing LEFT breast reduction with Dr. Nitin Watson    FH is significant for her mother who had colon cancer at age 39 and  at age 72, two paternal uncles had colon cancer in their late 62s to early 76s. [facility] Results (most recent):  Results from [place] encounter on 21    [facility] MAMMO LT SCREENING INCL CAD    Narrative  INDICATION: Prior right breast cancer, right mastectomy. Screening left  mammogram.    Digital screening left breast mammogram is performed with cc and MLO views. Study is interpreted in conjunction with CAD. Direct comparison made to prior mammograms. Breast parenchyma is heterogeneously dense and stable. There is no new dominant  mass. There is no suspicious cluster of microcalcification. There is no nipple  retraction or skin thickening. Impression  BI-RADS 2. Benign. Recommend annual screening mammography, unless  earlier clinical indication. A letter will be sent to the patient informing her  of the result. Review of Systems   All other systems reviewed and are negative. Physical Exam  Vitals and nursing note reviewed. Chest:      Breasts: Breasts are symmetrical.         Right: Tenderness present. No inverted nipple, mass, nipple discharge or skin change.          Left: No inverted nipple, mass, nipple discharge, skin change or tenderness. Lymphadenopathy:      Cervical: No cervical adenopathy. Upper Body:      Right upper body: No supraclavicular, axillary or pectoral adenopathy. Left upper body: No supraclavicular, axillary or pectoral adenopathy. BREAST ULTRASOUND  Indication: Right  breast mass 12:00 and pain  Technique: The area was scanned using a high-frequency linear-array near-field transducer  Findings: No abnormal mass, lesion, or shadowing noted. No cysts  Impression: Normal dense fibrocystic breast tissue   Disposition: No worrisome finding on ultrasound. No mass. ASSESSMENT and PLAN    ICD-10-CM ICD-9-CM    1. Status post right mastectomy  Z90.11 V45.71 REFERRAL TO PHYSICAL THERAPY     - exam and us today normal  Patient has pain right chest wall likely musculoskeletal.   Recommend PT. Offered to do mammo of rakesh flap or mri she doesn't want this  Will order PT   F/u 1 year. 30 minutes was spent with patient on counseling and coordination of care.

## 2022-03-18 PROBLEM — E55.9 VITAMIN D DEFICIENCY: Status: ACTIVE | Noted: 2018-02-19

## 2022-03-18 PROBLEM — C50.919 BREAST CA (HCC): Status: ACTIVE | Noted: 2017-02-23

## 2022-03-18 PROBLEM — Z85.3 HISTORY OF BREAST CANCER: Status: ACTIVE | Noted: 2018-02-19

## 2022-03-18 PROBLEM — M54.6 ACUTE LEFT-SIDED THORACIC BACK PAIN: Status: ACTIVE | Noted: 2018-07-03

## 2022-03-19 PROBLEM — Z90.10 S/P MASTECTOMY: Status: ACTIVE | Noted: 2017-03-09

## 2022-03-19 PROBLEM — Z90.11 H/O MASTECTOMY, RIGHT: Status: ACTIVE | Noted: 2018-02-19

## 2022-03-19 PROBLEM — Z98.890 HISTORY OF RECONSTRUCTION OF BOTH BREASTS: Status: ACTIVE | Noted: 2018-02-19

## 2022-03-19 PROBLEM — E04.9 GOITER: Status: ACTIVE | Noted: 2018-07-03

## 2022-03-20 PROBLEM — Z98.890 S/P BREAST RECONSTRUCTION, RIGHT: Status: ACTIVE | Noted: 2017-03-09

## 2022-04-06 ENCOUNTER — OFFICE VISIT (OUTPATIENT)
Dept: INTERNAL MEDICINE CLINIC | Age: 57
End: 2022-04-06
Payer: COMMERCIAL

## 2022-04-06 VITALS
DIASTOLIC BLOOD PRESSURE: 83 MMHG | TEMPERATURE: 98.6 F | OXYGEN SATURATION: 98 % | HEART RATE: 88 BPM | SYSTOLIC BLOOD PRESSURE: 119 MMHG | HEIGHT: 66 IN | RESPIRATION RATE: 16 BRPM | BODY MASS INDEX: 34.13 KG/M2 | WEIGHT: 212.4 LBS

## 2022-04-06 DIAGNOSIS — R53.83 FATIGUE, UNSPECIFIED TYPE: ICD-10-CM

## 2022-04-06 DIAGNOSIS — Z17.1 MALIGNANT NEOPLASM OF UPPER-OUTER QUADRANT OF RIGHT BREAST IN FEMALE, ESTROGEN RECEPTOR NEGATIVE (HCC): ICD-10-CM

## 2022-04-06 DIAGNOSIS — E66.9 OBESITY (BMI 30.0-34.9): ICD-10-CM

## 2022-04-06 DIAGNOSIS — G47.9 SLEEP DISTURBANCE: ICD-10-CM

## 2022-04-06 DIAGNOSIS — E04.9 GOITER: ICD-10-CM

## 2022-04-06 DIAGNOSIS — Z11.59 NEED FOR HEPATITIS C SCREENING TEST: ICD-10-CM

## 2022-04-06 DIAGNOSIS — C50.411 MALIGNANT NEOPLASM OF UPPER-OUTER QUADRANT OF RIGHT BREAST IN FEMALE, ESTROGEN RECEPTOR NEGATIVE (HCC): ICD-10-CM

## 2022-04-06 DIAGNOSIS — Z00.00 PHYSICAL EXAM, ANNUAL: Primary | ICD-10-CM

## 2022-04-06 DIAGNOSIS — E53.8 B12 DEFICIENCY: ICD-10-CM

## 2022-04-06 LAB
25(OH)D3 SERPL-MCNC: 25.3 NG/ML (ref 30–100)
ALBUMIN SERPL-MCNC: 3.7 G/DL (ref 3.5–5)
ALBUMIN/GLOB SERPL: 1.3 {RATIO} (ref 1.1–2.2)
ALP SERPL-CCNC: 83 U/L (ref 45–117)
ALT SERPL-CCNC: 25 U/L (ref 12–78)
ANION GAP SERPL CALC-SCNC: 5 MMOL/L (ref 5–15)
AST SERPL-CCNC: 8 U/L (ref 15–37)
BASOPHILS # BLD: 0 K/UL (ref 0–0.1)
BASOPHILS NFR BLD: 1 % (ref 0–1)
BILIRUB SERPL-MCNC: 0.4 MG/DL (ref 0.2–1)
BILIRUB UR QL STRIP: NEGATIVE
BUN SERPL-MCNC: 11 MG/DL (ref 6–20)
BUN/CREAT SERPL: 13 (ref 12–20)
CALCIUM SERPL-MCNC: 9.1 MG/DL (ref 8.5–10.1)
CHLORIDE SERPL-SCNC: 105 MMOL/L (ref 97–108)
CO2 SERPL-SCNC: 29 MMOL/L (ref 21–32)
CREAT SERPL-MCNC: 0.83 MG/DL (ref 0.55–1.02)
DIFFERENTIAL METHOD BLD: ABNORMAL
EOSINOPHIL # BLD: 0.1 K/UL (ref 0–0.4)
EOSINOPHIL NFR BLD: 2 % (ref 0–7)
ERYTHROCYTE [DISTWIDTH] IN BLOOD BY AUTOMATED COUNT: 12.7 % (ref 11.5–14.5)
EST. AVERAGE GLUCOSE BLD GHB EST-MCNC: 117 MG/DL
GLOBULIN SER CALC-MCNC: 2.8 G/DL (ref 2–4)
GLUCOSE SERPL-MCNC: 102 MG/DL (ref 65–100)
GLUCOSE UR-MCNC: NEGATIVE MG/DL
HBA1C MFR BLD: 5.7 % (ref 4–5.6)
HCT VFR BLD AUTO: 46.1 % (ref 35–47)
HCV AB SERPL QL IA: NONREACTIVE
HGB BLD-MCNC: 15 G/DL (ref 11.5–16)
IMM GRANULOCYTES # BLD AUTO: 0 K/UL (ref 0–0.04)
IMM GRANULOCYTES NFR BLD AUTO: 1 % (ref 0–0.5)
KETONES P FAST UR STRIP-MCNC: NORMAL MG/DL
LYMPHOCYTES # BLD: 2 K/UL (ref 0.8–3.5)
LYMPHOCYTES NFR BLD: 44 % (ref 12–49)
MCH RBC QN AUTO: 29 PG (ref 26–34)
MCHC RBC AUTO-ENTMCNC: 32.5 G/DL (ref 30–36.5)
MCV RBC AUTO: 89 FL (ref 80–99)
MONOCYTES # BLD: 0.3 K/UL (ref 0–1)
MONOCYTES NFR BLD: 7 % (ref 5–13)
NEUTS SEG # BLD: 1.9 K/UL (ref 1.8–8)
NEUTS SEG NFR BLD: 45 % (ref 32–75)
NRBC # BLD: 0 K/UL (ref 0–0.01)
NRBC BLD-RTO: 0 PER 100 WBC
PH UR STRIP: 5.5 [PH] (ref 4.6–8)
PLATELET # BLD AUTO: 229 K/UL (ref 150–400)
PMV BLD AUTO: 11.2 FL (ref 8.9–12.9)
POTASSIUM SERPL-SCNC: 4.2 MMOL/L (ref 3.5–5.1)
PROT SERPL-MCNC: 6.5 G/DL (ref 6.4–8.2)
PROT UR QL STRIP: NEGATIVE
RBC # BLD AUTO: 5.18 M/UL (ref 3.8–5.2)
SODIUM SERPL-SCNC: 139 MMOL/L (ref 136–145)
SP GR UR STRIP: 1.03 (ref 1–1.03)
T4 FREE SERPL-MCNC: 1.2 NG/DL (ref 0.8–1.5)
TSH SERPL DL<=0.05 MIU/L-ACNC: 0.85 UIU/ML (ref 0.36–3.74)
UA UROBILINOGEN AMB POC: NORMAL (ref 0.2–1)
URINALYSIS CLARITY POC: CLEAR
URINALYSIS COLOR POC: YELLOW
URINE BLOOD POC: NEGATIVE
URINE LEUKOCYTES POC: NEGATIVE
URINE NITRITES POC: NEGATIVE
WBC # BLD AUTO: 4.3 K/UL (ref 3.6–11)

## 2022-04-06 PROCEDURE — 99386 PREV VISIT NEW AGE 40-64: CPT | Performed by: NURSE PRACTITIONER

## 2022-04-06 PROCEDURE — 81002 URINALYSIS NONAUTO W/O SCOPE: CPT | Performed by: NURSE PRACTITIONER

## 2022-04-06 NOTE — PROGRESS NOTES
Rm 7    Chief Complaint   Patient presents with   [address]     pt states she feels really sluggish. Does not matter how many hours of sleep she gets. she is requesting a b12 injection         1. Have you been to the ER, urgent care clinic since your last visit? Hospitalized since your last visit? No    2. Have you seen or consulted any other health care providers outside of the [address] [place] [address] since your last visit? Include any pap smears or colon screening. No        Health Maintenance Due   Topic Date Due    Hepatitis C Screening  Never done    Depression Screen  Never done    Pneumococcal 0-64 years (1 of 4 - PCV13) Never done    COVID-19 Vaccine (1) Never done    DTaP/Tdap/Td series (1 - Tdap) Never done    Lipid Screen  Never done    Shingrix Vaccine Age 50> (1 of 2) Never done            3 most recent PHQ Screens 4/6/2022   Little interest or pleasure in doing things Not at all   Feeling down, depressed, irritable, or hopeless Not at all   Total Score PHQ 2 0           Learning Assessment 9/23/2020   PRIMARY LEARNER Patient   PRIMARY LANGUAGE ENGLISH   LEARNER PREFERENCE PRIMARY DEMONSTRATION   ANSWERED BY PATIENT   RELATIONSHIP SELF         An After Visit Summary was printed and given to the patient.

## 2022-04-06 NOTE — PATIENT INSTRUCTIONS
Fatigue: Care Instructions  Your Care Instructions     Fatigue is a feeling of tiredness, exhaustion, or lack of energy. You may feel fatigue because of too much or not enough activity. It can also come from stress, lack of sleep, boredom, and poor diet. Many medical problems, such as viral infections, can cause fatigue. Emotional problems, especially depression, are often the cause of fatigue. Fatigue is most often a symptom of another problem. Treatment for fatigue depends on the cause. For example, if you have fatigue because you have a certain health problem, treating this problem also treats your fatigue. If depression or anxiety is the cause, treatment may help. Follow-up care is a key part of your treatment and safety. Be sure to make and go to all appointments, and call your doctor if you are having problems. It's also a good idea to know your test results and keep a list of the medicines you take. How can you care for yourself at home? · Get regular exercise. But don't overdo it. Go back and forth between rest and exercise. · Get plenty of rest.  · Eat a healthy diet. Do not skip meals, especially breakfast.  · Reduce your use of caffeine, tobacco, and alcohol. Caffeine is most often found in coffee, tea, cola drinks, and chocolate. · Limit medicines that can cause fatigue. This includes tranquilizers and cold and allergy medicines. When should you call for help? Watch closely for changes in your health, and be sure to contact your doctor if:    · You have new symptoms such as fever or a rash.     · Your fatigue gets worse.     · You have been feeling down, depressed, or hopeless. Or you may have lost interest in things that you usually enjoy.     · You are not getting better as expected. Where can you learn more? Go to http://www.gray.com/  Enter [id number] in the search box to learn more about \"Fatigue: Care Instructions. \"  Current as of: July 1, 2021               Content Version: 13.2  © 2006-2022 [employer]. Care instructions adapted under license by [employer] (which disclaims liability or warranty for this information). If you have questions about a medical condition or this instruction, always ask your healthcare professional. [address] any warranty or liability for your use of this information. Goiter: Care Instructions  Your Care Instructions     A goiter is an enlarged thyroid gland. It can cause swelling in your neck. Your thyroid is found in the front of your neck. It makes a hormone that controls how your body uses energy. Goiters are caused by different things. Some are caused by high or low levels of thyroid hormone. Others are caused by too little iodine in the diet, or a growth or disease in the thyroid. In the [place], most goiters are caused by long-term autoimmune thyroiditis. This is also called Hashimoto's thyroiditis. It happens when the body's immune system damages the thyroid. You may take thyroid hormone to reduce the size of your goiter. Or you may need surgery or radioactive iodine treatment. Some people don't need any treatment. They only need to watch for changes in the goiter. Follow-up care is a key part of your treatment and safety. Be sure to make and go to all appointments, and call your doctor if you are having problems. It's also a good idea to know your test results and keep a list of the medicines you take. How can you care for yourself at home? · Be safe with medicines. Take your medicines exactly as prescribed. Call your doctor if you think you are having a problem with your medicine. You will get more details on the specific medicines your doctor prescribes. When should you call for help? Call 911 anytime you think you may need emergency care. For example, call if:    · You have trouble breathing.    Watch closely for changes in your health, and be sure to contact your doctor if:    · Your eyes turn red and bulge.     · You have trouble swallowing.     · You feel very tired or weak.     · You lose weight but are eating the same or more than usual.   Where can you learn more? Go to http://www.gray.com/  Enter W017 in the search box to learn more about \"Goiter: Care Instructions. \"  Current as of: July 28, 2021               Content Version: 13.2  © 2006-2022 [employer]. Care instructions adapted under license by [employer] (which disclaims liability or warranty for this information). If you have questions about a medical condition or this instruction, always ask your healthcare professional. [place] [address] any warranty or liability for your use of this information. Well Visit, Women 48 to 72: Care Instructions  Overview     Well visits can help you stay healthy. Your doctor has checked your overall health and may have suggested ways to take good care of yourself. Your doctor also may have recommended tests. At home, you can help prevent illness with healthy eating, regular exercise, and other steps. Follow-up care is a key part of your treatment and safety. Be sure to make and go to all appointments, and call your doctor if you are having problems. It's also a good idea to know your test results and keep a list of the medicines you take. How can you care for yourself at home? · Get screening tests that you and your doctor decide on. Screening helps find diseases before any symptoms appear. · Eat healthy foods. Choose fruits, vegetables, whole grains, protein, and low-fat dairy foods. Limit fat, especially saturated fat. Reduce salt in your diet. · Limit alcohol. Have no more than 1 drink a day or 7 drinks a week. · Get at least 30 minutes of exercise on most days of the week. Walking is a good choice.  You also may want to do other activities, such as running, swimming, cycling, or playing tennis or team sports. · Reach and stay at a healthy weight. This will lower your risk for many problems, such as obesity, diabetes, heart disease, and high blood pressure. · Do not smoke. Smoking can make health problems worse. If you need help quitting, talk to your doctor about stop-smoking programs and medicines. These can increase your chances of quitting for good. · Care for your mental health. It is easy to get weighed down by worry and stress. Learn strategies to manage stress, like deep breathing and mindfulness, and stay connected with your family and community. If you find you often feel sad or hopeless, talk with your doctor. Treatment can help. · Talk to your doctor about whether you have any risk factors for sexually transmitted infections (STIs). You can help prevent STIs if you wait to have sex with a new partner (or partners) until you've each been tested for STIs. It also helps if you use condoms (male or female condoms) and if you limit your sex partners to one person who only has sex with you. Vaccines are available for some STIs. · If you think you may have a problem with alcohol or drug use, talk to your doctor. This includes prescription medicines (such as amphetamines and opioids) and illegal drugs (such as cocaine and methamphetamine). Your doctor can help you figure out what type of treatment is best for you. · Protect your skin from too much sun. When you're outdoors from 10 a.m. to 4 p.m., stay in the shade or cover up with clothing and a hat with a wide brim. Wear sunglasses that block UV rays. Even when it's cloudy, put broad-spectrum sunscreen (SPF 30 or higher) on any exposed skin. · See a dentist one or two times a year for checkups and to have your teeth cleaned. · Wear a seat belt in the car. When should you call for help?   Watch closely for changes in your health, and be sure to contact your doctor if you have any problems or symptoms that concern you.  Where can you learn more? Go to http://www.gray.com/  Enter [id number] in the search box to learn more about \"Well Visit, Women 50 to 72: Care Instructions. \"  Current as of: October 6, 2021               Content Version: 13.2  © 5081-7842 [employer]. Care instructions adapted under license by [employer] (which disclaims liability or warranty for this information). If you have questions about a medical condition or this instruction, always ask your healthcare professional. [place] [address] any warranty or liability for your use of this information.

## 2022-04-06 NOTE — PROGRESS NOTES
Subjective  Jay Quiroga is a 62 y.o. female presents to [facility] care   HPI   Past Medical History:   Diagnosis Date    Atopic dermatitis     Breast cancer, right ([address] 07/2016    Menopause     Pleurisy 2006    Thyroid nodule     benign       No Known Allergies     Past Surgical History:   Procedure Laterality Date    HX BREAST BIOPSY Right 2016    HX BREAST RECONSTRUCTION Right 2/23/2017    RIGHT BREAST RECONSTRUCTION  /  TISSUE EXPANDER / ALLODERM  performed by Claudetta Dikes, MD at [facility]    HX BREAST REDUCTION Left 2017    post mastectomy contrlateral breast    HX GYN  2002    conization    HX MASTECTOMY Right 2/23/2017    RIGHT BREAST TOTAL MASTECTOMY, AXILLARY LYMPH NODE DISSECTION, PORTACATH REMOVAL performed by Zo Siu MD at [facility]    HX TONSILLECTOMY  1974    HX VASCULAR ACCESS  2016    portacath insertion and removal       Current Outpatient Medications   Medication Sig    cholecalciferol (VITAMIN D3) 1,000 unit cap Take  by mouth daily. No current facility-administered medications for this visit. Family History   Problem Relation Age of Onset    Colon Cancer Mother 39    Colon Cancer Paternal Uncle 79    Colon Cancer Paternal Uncle 79   colonoscopy q 5 years; unsure of follow up. Cannot recall name or GI specialist   Hx right BCA 2017 with mastectomy. Sees Dr Ernestina Carvalho for surveillance     She feels sluggish, even on days she had good sleep   Gets very little water daily    Pedialyte ineffective  Was eating a lot of ice to hydrate 5 years ago   Hx of anemia   Hx of B12 deficiency; received B12 injections      Sleep study completed ~ 5 years ago.  Was first told was normal then called to inform her she needed CPAP;she never got it     Was seeing endocrinologist for thyroid nodule, nodule stable 5 years; no intervention     First pregnancy at 37, had healthy girl     Psychosocial Hx     Situational depression d/t divorce proceedings No tobacco or alcohol  Works as counselor in [address]     Works at [employer]     No tobacco       No alcohol          Depression     Anxiety      Born                 ROS    Objective  Physical Exam  Constitutional:       General: She is not in acute distress. Appearance: Normal appearance. She is not ill-appearing. HENT:      Head: Normocephalic and atraumatic. Right Ear: Tympanic membrane, ear canal and external ear normal. There is no impacted cerumen. Left Ear: Tympanic membrane, ear canal and external ear normal. There is no impacted cerumen. Eyes:      Conjunctiva/sclera: Conjunctivae normal.      Pupils: Pupils are equal, round, and reactive to light. Neck:      Thyroid: Thyroid mass (left thyroid ) and thyromegaly present. No thyroid tenderness. Cardiovascular:      Rate and Rhythm: Normal rate and regular rhythm. Pulses: Normal pulses. Heart sounds: Normal heart sounds. Pulmonary:      Effort: Pulmonary effort is normal.      Breath sounds: Normal breath sounds. Abdominal:      General: Bowel sounds are normal. There is no distension. Palpations: Abdomen is soft. There is no mass. Tenderness: There is no abdominal tenderness. There is no right CVA tenderness or left CVA tenderness. Hernia: No hernia is present. Musculoskeletal:      Right knee: Crepitus present. No deformity. Normal range of motion. Left knee: Crepitus present. No deformity. Normal range of motion. Right lower leg: No edema. Left lower leg: No edema. Lymphadenopathy:      Cervical: No cervical adenopathy. Skin:     General: Skin is warm and dry. Findings: No erythema or rash. Neurological:      Mental Status: She is alert. Mental status is at baseline.       Cranial Nerves: No cranial nerve deficit. Gait: Gait normal.   Psychiatric:         Mood and Affect: Mood normal.         Behavior: Behavior normal.         Thought Content: Thought content normal.          Assessment & Plan    ICD-10-CM ICD-9-CM    1. Physical exam, annual  Z00.00 V70.0 CBC WITH AUTOMATED DIFF      METABOLIC PANEL, COMPREHENSIVE      HEMOGLOBIN A1C WITH EAG      TSH 3RD GENERATION      T4, FREE      AMB POC URINALYSIS DIP STICK MANUAL W/O MICRO      T4, FREE      TSH 3RD GENERATION      HEMOGLOBIN A1C WITH EAG      METABOLIC PANEL, COMPREHENSIVE      CBC WITH AUTOMATED DIFF   2. Fatigue, unspecified type  R53.83 780.79 CBC WITH AUTOMATED DIFF      METABOLIC PANEL, COMPREHENSIVE      HEMOGLOBIN A1C WITH EAG      TSH 3RD GENERATION      T4, FREE      VITAMIN D, 25 HYDROXY      AMB POC URINALYSIS DIP STICK MANUAL W/O MICRO      REFERRAL TO SLEEP STUDIES      VITAMIN D, 25 HYDROXY      T4, FREE      TSH 3RD GENERATION      HEMOGLOBIN A1C WITH EAG      METABOLIC PANEL, COMPREHENSIVE      CBC WITH AUTOMATED DIFF      VITAMIN B12   3. Need for hepatitis C screening test  Z11.59 V73.89 HEPATITIS C AB      HEPATITIS C AB   4. Sleep disturbance  G47.9 780.50 REFERRAL TO SLEEP STUDIES   5. Goiter  E04.9 240.9 US THYROID/PARATHYROID/SOFT TISS   6. B12 deficiency  E53.8 266.2 VITAMIN B12   7. Obesity (BMI 30.0-34. 9)  E66.9 278.00    8. Malignant neoplasm of upper-outer quadrant of right breast in female, estrogen receptor negative ([facility]  C50.411 174.4     Z17.1 V86.1      Diagnoses and all orders for this visit:    1. Physical exam, annual  -     CBC WITH AUTOMATED DIFF; Future  -     METABOLIC PANEL, COMPREHENSIVE; Future  -     HEMOGLOBIN A1C WITH EAG; Future  -     TSH 3RD GENERATION; Future  -     T4, FREE; Future  -     AMB POC URINALYSIS DIP STICK MANUAL W/O MICRO    2. Fatigue, unspecified type  -     CBC WITH AUTOMATED DIFF; Future  -     METABOLIC PANEL, COMPREHENSIVE; Future  -     HEMOGLOBIN A1C WITH EAG;  Future  - TSH 3RD GENERATION; Future  -     T4, FREE; Future  -     VITAMIN D, 25 HYDROXY; Future  -     AMB POC URINALYSIS DIP STICK MANUAL W/O MICRO  -     REFERRAL TO SLEEP STUDIES  -     VITAMIN B12; Future    3. Need for hepatitis C screening test  -     HEPATITIS C AB; Future    4. Sleep disturbance  -     REFERRAL TO SLEEP STUDIES    5. Goiter  -     US THYROID/PARATHYROID/SOFT TISS; Future    6. B12 deficiency  -     VITAMIN B12; Future    7. Obesity (BMI 30.0-34.9)    8. Malignant neoplasm of upper-outer quadrant of right breast in female, estrogen receptor negative ([facility]      Follow-up and Dispositions    · Return in about 3 months (around 7/6/2022) for sleep disturbance, fatigue, goiter . fatigue likely multifactorial. Strongly encouraged adequate hydration, regular physical activity, balanced diet, proper sleep hygiene  discussed need for repeat sleep study  discussed vaccine appropriate for age and chronic medical conditions   Encouraged follow up with gyn and GI providers   reviewed diet, exercise and weight control    Patient voices understanding and acceptance of this advice and will call back if any further questions or concerns.   Virgil Aj, NP

## 2022-04-13 ENCOUNTER — HOSPITAL ENCOUNTER (OUTPATIENT)
Dept: ULTRASOUND IMAGING | Age: 57
Discharge: HOME | End: 2022-04-13
Attending: NURSE PRACTITIONER
Payer: COMMERCIAL

## 2022-04-13 DIAGNOSIS — E04.9 GOITER: ICD-10-CM

## 2022-04-13 PROCEDURE — 76536 US EXAM OF HEAD AND NECK: CPT

## 2022-04-18 DIAGNOSIS — E04.1 NODULE OF LEFT LOBE OF THYROID GLAND: Primary | ICD-10-CM

## 2022-04-18 NOTE — PROGRESS NOTES
Gilberto Ms Kerri Deal  Lab results show:  Vitamin D is low. Take over the counter vitamin D3 1000 units daily  Thyroid function is good. See note regarding thyroid ultrasound   Blood sugar is in the prediabetes range. Follow a low carbohydrate diet and a exercise program to prevent diabetes   Kidney and liver function are good   B12 was not done.  This will be done on your next labs, until then take  over the counter vitamin B12 1000 mcg daily   Please get sleep study as soon as possible

## 2022-06-16 ENCOUNTER — TELEPHONE (OUTPATIENT)
Dept: SLEEP MEDICINE | Age: 57
End: 2022-06-16

## 2022-07-19 ENCOUNTER — TELEPHONE (OUTPATIENT)
Dept: SLEEP MEDICINE | Age: 57
End: 2022-07-19

## 2022-07-19 ENCOUNTER — PATIENT MESSAGE (OUTPATIENT)
Dept: SLEEP MEDICINE | Age: 57
End: 2022-07-19

## 2022-08-08 ENCOUNTER — TELEPHONE (OUTPATIENT)
Dept: SLEEP MEDICINE | Age: 57
End: 2022-08-08

## 2022-08-08 NOTE — TELEPHONE ENCOUNTER
Patient called bc she was referred by provider Rogerio Hare NP  to our office for a Sleep Medicine Consultation appointment. Patient wanted an appointment this week and stated no other day will work. Patient was not scheduled.

## 2022-11-04 NOTE — PROGRESS NOTES
HISTORY OF PRESENT ILLNESS  Ke Clay is a 62 y.o. female. HPI ESTABLISHED patient here for follow up for RIGHT breast IDC s/p RIGHT mastectomy in 2017. Still having tightness and hardest in breast. Denies any other changes. History of breast cancer-  RIGHT breast IDC triple negative. Completed neoadjuvant chemotherapy. Followed by Dr. Concepcion Hughes. 17- RIGHT mastectomy, ALND, tissue expander placed by Dr. Lakhwinder Curry. 0/15 + LN.  3/30/17- evacuation of RIGHT seroma by Dr. Lakhwinder Curry. Declined radiation. Seen by. Dr. Shea Naranjo.   17- NANCY flap with balancing LEFT breast reduction with Dr. Lakhwinder Curry. FH is significant for her mother who had colon cancer at age 39 and  at age 72, two paternal uncles had colon cancer in their late 62s to early 76s. [facility] Results (most recent):  Results from [place] encounter on 21    [facility] MAMMO LT SCREENING INCL CAD    Narrative  INDICATION: Prior right breast cancer, right mastectomy. Screening left  mammogram.    Digital screening left breast mammogram is performed with cc and MLO views. Study is interpreted in conjunction with CAD. Direct comparison made to prior mammograms. Breast parenchyma is heterogeneously dense and stable. There is no new dominant  mass. There is no suspicious cluster of microcalcification. There is no nipple  retraction or skin thickening. Impression  BI-RADS 2. Benign. Recommend annual screening mammography, unless  earlier clinical indication. A letter will be sent to the patient informing her  of the result. Review of Systems   All other systems reviewed and are negative. Physical Exam  Vitals and nursing note reviewed. Neck:      Comments: Patient with goiter   Chest:   Breasts:     Right: No swelling, bleeding, inverted nipple, mass, nipple discharge, skin change or tenderness. Left: No swelling, bleeding, inverted nipple, mass, nipple discharge, skin change or tenderness.        Lymphadenopathy: Upper Body:      Right upper body: No axillary adenopathy. Left upper body: No axillary adenopathy. ASSESSMENT and PLAN    ICD-10-CM ICD-9-CM    1. History of right breast cancer  Z85.3 V10.3 SKIP 3D [facility] W MAMMO LT SCREENING INCL CAD      2. Malignant neoplasm of upper-outer quadrant of right breast in female, estrogen receptor negative [facility]  C50.411 174.4     Z17.1 V86.1       - doing well. No evidence local recurrence  - has goiter and has had neck us. Recommend seeing ENT as patient reports trouble swallowing  Gave names of ENT docs. - rtc in 1 year. 20 minutes was spent with patient on counseling and coordination of care.

## 2022-11-07 ENCOUNTER — OFFICE VISIT (OUTPATIENT)
Dept: SURGERY | Age: 57
End: 2022-11-07
Payer: COMMERCIAL

## 2022-11-07 VITALS — HEIGHT: 66 IN | WEIGHT: 207 LBS | BODY MASS INDEX: 33.27 KG/M2

## 2022-11-07 DIAGNOSIS — Z85.3 HISTORY OF RIGHT BREAST CANCER: Primary | ICD-10-CM

## 2022-11-07 DIAGNOSIS — Z17.1 MALIGNANT NEOPLASM OF UPPER-OUTER QUADRANT OF RIGHT BREAST IN FEMALE, ESTROGEN RECEPTOR NEGATIVE (HCC): ICD-10-CM

## 2022-11-07 DIAGNOSIS — C50.411 MALIGNANT NEOPLASM OF UPPER-OUTER QUADRANT OF RIGHT BREAST IN FEMALE, ESTROGEN RECEPTOR NEGATIVE (HCC): ICD-10-CM

## 2022-11-07 PROCEDURE — 99213 OFFICE O/P EST LOW 20 MIN: CPT | Performed by: SURGERY

## 2023-02-07 NOTE — PROGRESS NOTES
Rm: 09      Chief Complaint   Patient presents with    Pre-op Exam       Visit Vitals  /87 (BP 1 Location: Left upper arm, BP Patient Position: Sitting, BP Cuff Size: Adult long)   Pulse 80   Temp 98.1 °F (36.7 °C) (Oral)   Resp 16   Ht 5' 6\" (1.676 m)   Wt 211 lb 6.4 oz (95.9 kg)   LMP 06/24/2016   SpO2 100%   BMI 34.12 kg/m²       1. Have you been to the ER, urgent care clinic since your last visit? Hospitalized since your last visit? No    2. Have you seen or consulted any other health care providers outside of the [address] [place] [address] since your last visit? Include any pap smears or colon screening.  No

## 2023-02-08 ENCOUNTER — OFFICE VISIT (OUTPATIENT)
Dept: INTERNAL MEDICINE CLINIC | Age: 58
End: 2023-02-08
Payer: COMMERCIAL

## 2023-02-08 VITALS
WEIGHT: 211.4 LBS | BODY MASS INDEX: 33.97 KG/M2 | OXYGEN SATURATION: 100 % | DIASTOLIC BLOOD PRESSURE: 87 MMHG | SYSTOLIC BLOOD PRESSURE: 137 MMHG | RESPIRATION RATE: 16 BRPM | HEIGHT: 66 IN | HEART RATE: 80 BPM | TEMPERATURE: 98.1 F

## 2023-02-08 DIAGNOSIS — E55.9 VITAMIN D DEFICIENCY: ICD-10-CM

## 2023-02-08 DIAGNOSIS — E53.8 B12 DEFICIENCY: ICD-10-CM

## 2023-02-08 DIAGNOSIS — E04.1 THYROID NODULE: Primary | ICD-10-CM

## 2023-02-08 DIAGNOSIS — Z01.818 PRE-OP EVALUATION: ICD-10-CM

## 2023-02-08 DIAGNOSIS — R53.83 FATIGUE, UNSPECIFIED TYPE: ICD-10-CM

## 2023-02-08 DIAGNOSIS — Z87.898 HISTORY OF PREDIABETES: ICD-10-CM

## 2023-02-08 PROCEDURE — 99214 OFFICE O/P EST MOD 30 MIN: CPT | Performed by: NURSE PRACTITIONER

## 2023-02-08 NOTE — PROGRESS NOTES
Subjective  Igorewa Suero is a 62 y.o. female presents for H&P  HPI    No surgery complication     ROS    Objective  Physical Exam     Assessment & Plan  {ASSESSMENT/PLAN:65059}  Jasbir Weldon NP Physical Exam  Constitutional:       General: She is not in acute distress. Appearance: Normal appearance. She is not ill-appearing. HENT:      Head: Normocephalic and atraumatic. Right Ear: Tympanic membrane, ear canal and external ear normal. There is no impacted cerumen. Left Ear: Tympanic membrane, ear canal and external ear normal. There is no impacted cerumen. Nose: No congestion. Eyes:      Conjunctiva/sclera: Conjunctivae normal.   Neck:      Thyroid: Thyromegaly present. No thyroid tenderness. Vascular: Normal carotid pulses. No carotid bruit or JVD. Cardiovascular:      Rate and Rhythm: Normal rate and regular rhythm. Pulmonary:      Effort: Pulmonary effort is normal.      Breath sounds: Normal breath sounds. Abdominal:      General: Bowel sounds are normal.      Palpations: Abdomen is soft. Musculoskeletal:         General: No deformity. Cervical back: No rigidity. No pain with movement. Normal range of motion. Right lower leg: No edema. Left lower leg: No edema. Lymphadenopathy:      Cervical: No cervical adenopathy. Skin:     General: Skin is warm and dry. Findings: No bruising, erythema, lesion or rash. Neurological:      Mental Status: She is alert and oriented to person, place, and time. Mental status is at baseline. Cranial Nerves: No cranial nerve deficit. Gait: Gait normal.   Psychiatric:         Mood and Affect: Mood normal.         Behavior: Behavior normal.         Thought Content: Thought content normal.        Assessment & Plan    ICD-10-CM ICD-9-CM    1. Thyroid nodule  E04.1 241.0       2. Pre-op evaluation  Z01.818 V72.84 AMB POC EKG ROUTINE W/ 12 LEADS, INTER & REP      3. Fatigue, unspecified type  R53.83 780.79 CBC WITH AUTOMATED DIFF      METABOLIC PANEL, COMPREHENSIVE      CBC WITH AUTOMATED DIFF      METABOLIC PANEL, COMPREHENSIVE      4. B12 deficiency  E53.8 266.2 VITAMIN B12      VITAMIN B12      5.  History of prediabetes  Z87.898 V12.29 HEMOGLOBIN A1C WITH EAG      HEMOGLOBIN A1C WITH EAG      6. Vitamin D deficiency  E55.9 268.9 VITAMIN D, 25 HYDROXY      VITAMIN D, 25 HYDROXY        Follow-up and Dispositions    Return in about 6 months (around 8/8/2023).      EKG no done before patient discharged, will call to return for EKG  No current contraindications to planned surgery   current treatment plan is effective, no change in therapy  lab results and schedule of future lab studies reviewed with patient  reviewed medications and side effects in detail  Cassy Vieyra NP

## 2023-02-09 LAB
25(OH)D3 SERPL-MCNC: 31.8 NG/ML (ref 30–100)
ALBUMIN SERPL-MCNC: 3.8 G/DL (ref 3.5–5)
ALBUMIN/GLOB SERPL: 1.4 (ref 1.1–2.2)
ALP SERPL-CCNC: 78 U/L (ref 45–117)
ALT SERPL-CCNC: 23 U/L (ref 12–78)
ANION GAP SERPL CALC-SCNC: 3 MMOL/L (ref 5–15)
AST SERPL-CCNC: 9 U/L (ref 15–37)
BASOPHILS # BLD: 0 K/UL (ref 0–0.1)
BASOPHILS NFR BLD: 1 % (ref 0–1)
BILIRUB SERPL-MCNC: 0.5 MG/DL (ref 0.2–1)
BUN SERPL-MCNC: 8 MG/DL (ref 6–20)
BUN/CREAT SERPL: 10 (ref 12–20)
CALCIUM SERPL-MCNC: 9.1 MG/DL (ref 8.5–10.1)
CHLORIDE SERPL-SCNC: 106 MMOL/L (ref 97–108)
CO2 SERPL-SCNC: 28 MMOL/L (ref 21–32)
CREAT SERPL-MCNC: 0.79 MG/DL (ref 0.55–1.02)
DIFFERENTIAL METHOD BLD: ABNORMAL
EOSINOPHIL # BLD: 0.1 K/UL (ref 0–0.4)
EOSINOPHIL NFR BLD: 2 % (ref 0–7)
ERYTHROCYTE [DISTWIDTH] IN BLOOD BY AUTOMATED COUNT: 12.3 % (ref 11.5–14.5)
EST. AVERAGE GLUCOSE BLD GHB EST-MCNC: 114 MG/DL
GLOBULIN SER CALC-MCNC: 2.8 G/DL (ref 2–4)
GLUCOSE SERPL-MCNC: 70 MG/DL (ref 65–100)
HBA1C MFR BLD: 5.6 % (ref 4–5.6)
HCT VFR BLD AUTO: 50.2 % (ref 35–47)
HGB BLD-MCNC: 15.9 G/DL (ref 11.5–16)
IMM GRANULOCYTES # BLD AUTO: 0 K/UL (ref 0–0.04)
IMM GRANULOCYTES NFR BLD AUTO: 1 % (ref 0–0.5)
LYMPHOCYTES # BLD: 1.7 K/UL (ref 0.8–3.5)
LYMPHOCYTES NFR BLD: 41 % (ref 12–49)
MCH RBC QN AUTO: 28.3 PG (ref 26–34)
MCHC RBC AUTO-ENTMCNC: 31.7 G/DL (ref 30–36.5)
MCV RBC AUTO: 89.5 FL (ref 80–99)
MONOCYTES # BLD: 0.3 K/UL (ref 0–1)
MONOCYTES NFR BLD: 6 % (ref 5–13)
NEUTS SEG # BLD: 2.1 K/UL (ref 1.8–8)
NEUTS SEG NFR BLD: 49 % (ref 32–75)
NRBC # BLD: 0 K/UL (ref 0–0.01)
NRBC BLD-RTO: 0 PER 100 WBC
PLATELET # BLD AUTO: 201 K/UL (ref 150–400)
PMV BLD AUTO: 11.9 FL (ref 8.9–12.9)
POTASSIUM SERPL-SCNC: 4 MMOL/L (ref 3.5–5.1)
PROT SERPL-MCNC: 6.6 G/DL (ref 6.4–8.2)
RBC # BLD AUTO: 5.61 M/UL (ref 3.8–5.2)
SODIUM SERPL-SCNC: 137 MMOL/L (ref 136–145)
VIT B12 SERPL-MCNC: 331 PG/ML (ref 193–986)
WBC # BLD AUTO: 4.2 K/UL (ref 3.6–11)

## 2023-02-22 NOTE — PERIOP NOTES
[address] INSTRUCTIONS    Surgery Date:   02-    Your surgeon's office or [facility] staff will call you between 4 PM- 8 PM the day before surgery with your arrival time. If your surgery is on a Monday, you will receive a call the preceding Friday. Please report to [facility] Patient Access/Admitting on the 1st floor. Bring your insurance card, photo identification, and any copayment ( if applicable). If you are going home the same day of your surgery, you must have a responsible adult to drive you home. You need to have a responsible adult to stay with you the first 24 hours after surgery and you should not drive a car for 24 hours following your surgery. Nothing to eat or drink after midnight the night before surgery. This includes no water, gum, mints, coffee, juice, etc.  Please note special instructions, if applicable, below for medications. Do NOT drink alcohol or smoke 24 hours before surgery. STOP smoking for 14 days prior as it helps with breathing and healing after surgery. If you are being admitted to the hospital, please leave personal belongings/luggage in your car until you have an assigned hospital room number. Please wear comfortable clothes. Wear your glasses instead of contacts. We ask that all money, jewelry and valuables be left at home. Wear no make up, particularly mascara, the day of surgery. All body piercings, rings, and jewelry need to be removed and left at home. Please remove any nail polish or artificial nails from your fingernails. Please wear your hair loose or down. Please no pony-tails, buns, or any metal hair accessories. If you shower the morning of surgery, please do not apply any lotions or powders afterwards. You may wear deodorant, unless having breast surgery. Do not shave any body area within 24 hours of your surgery. Please follow all instructions to avoid any potential surgical cancellation.   Should your physical condition change, (i.e. fever, cold, flu, etc.) please notify your surgeon as soon as possible. It is important to be on time. If a situation occurs where you may be delayed, please call:  [phone] / [phone] on the day of surgery. The Preadmission Testing staff can be reached at [phone]. Special instructions:       No current facility-administered medications for this encounter. Current Outpatient Medications   Medication Sig    cholecalciferol (VITAMIN D3) 25 mcg (1,000 unit) cap Take  by mouth daily. YOU MUST ONLY TAKE THESE MEDICATIONS THE MORNING OF SURGERY WITH A SIP OF WATER:   MEDICATIONS TO TAKE THE MORNING OF SURGERY ONLY IF NEEDED:   HOLD these prescription medications BEFORE Surgery:   Ask your surgeon/prescribing physician about when/if to STOP taking these medications:   Stop all vitamins, herbal medicines and Aspirin containing products 7 days prior to surgery. Stop any non-steroidal anti-inflammatory drugs (i.e. Ibuprofen, Naproxen, Advil, Aleve) 3 days before surgery. You may take Tylenol. If you are currently taking Plavix, Coumadin, or any other blood-thinning/anticoagulant medication contact your prescribing physician for instructions. Preventing Infections Before and After - Your Surgery    IMPORTANT INSTRUCTIONS      You play an important role in your health and preparation for surgery. To reduce the germs on your skin you will need to shower with CHG soap (Chorhexidine gluconate 4%) two times before surgery. CHG soap (Hibiclens, Hex-A-Clens or store brand)  CHG soap will be provided at your Preadmission Testing (PAT) appointment. If you do not have a PAT appointment before surgery, you may arrange to  CHG soap from our office or purchase CHG soap at a pharmacy, grocery or department store. You need to purchase TWO 4 ounce bottles to use for your 2 showers.     Steps to follow:  Wash your hair with your normal shampoo and your body with regular soap and rinse well to remove shampoo and soap from your skin. Wet a clean washcloth and turn off the shower. Put CHG soap on washcloth and apply to your entire body from the neck down. Do not use on your head, face or private parts(genitals). Do not use CHG soap on open sores, wounds or areas of skin irritation. Wash you body gently for 5 minutes. Do not wash your skin too hard. This soap does not create lather. Pay special attention to your underarms and from your belly button to your feet. Turn the shower back on and rinse well to get CHG soap off your body. Pat your skin dry with a clean, dry towel. Do not apply lotions or moisturizer. Put on clean clothes and sleep on fresh bed sheets and do not allow pets to sleep with you. Shower with CHG soap 2 times before your surgery  The evening before your surgery  The morning of your surgery      Tips to help prevent infections after your surgery:  Protect your surgical wound from germs:  Hand washing is the most important thing you and your caregivers can do to prevent infections. Keep your bandage clean and dry! Do not touch your surgical wound. Use clean, freshly washed towels and washcloths every time you shower; do not share bath linens with others. Until your surgical wound is healed, wear clothing and sleep on bed linens each day that are clean and freshly washed. Do not allow pets to sleep in your bed with you or touch your surgical wound. Do not smoke - smoking delays wound healing. This may be a good time to stop smoking. If you have diabetes, it is important for you to manage your blood sugar levels properly before your surgery as well as after your surgery. Poorly managed blood sugar levels slow down wound healing and prevent you from healing completely. Patient Information Regarding COVID Restrictions    Day of Procedure    Please park in the parking deck or any designated visitor parking lot.   Enter the facility through the [employer] of the hospital.  On the day of surgery, please provide the cell phone number of the person who will be waiting for you to the Patient Access representative at the time of registration. Masks are highly recommended in the hospital, but not required. Once your procedure and the immediate recovery period is completed, a nurse in the recovery area will contact your designated visitor to inform them of your room number or to otherwise review other pertinent information regarding your care. Social distancing practices are strongly encouraged in waiting areas and the cafeteria. The patient was contacted  via phone. She verbalized understanding of all instructions does not  need reinforcement.

## 2023-02-23 ENCOUNTER — CLINICAL SUPPORT (OUTPATIENT)
Dept: INTERNAL MEDICINE CLINIC | Age: 58
End: 2023-02-23
Payer: COMMERCIAL

## 2023-02-23 VITALS — DIASTOLIC BLOOD PRESSURE: 88 MMHG | SYSTOLIC BLOOD PRESSURE: 127 MMHG | HEART RATE: 84 BPM | TEMPERATURE: 97.5 F

## 2023-02-23 DIAGNOSIS — Z01.818 PRE-OP EXAM: Primary | ICD-10-CM

## 2023-02-23 PROCEDURE — 93000 ELECTROCARDIOGRAM COMPLETE: CPT | Performed by: NURSE PRACTITIONER

## 2023-02-23 NOTE — IP AVS SNAPSHOT
Current Discharge Medication List  
  
Take these medications at their scheduled times Dose & Instructions Dispensing Information Comments Morning Noon Evening Bedtime  
 ferrous sulfate 324 mg (65 mg iron) tablet Your next dose is: Today, Tomorrow Other:  ____________ Take  by mouth Daily (before breakfast). Refills:  0  
     
   
   
   
  
 OTHER(NON-FORMULARY) Your next dose is: Today, Tomorrow Other:  ____________ Dose:  1 Tab Take 1 Tab by mouth three (3) times daily. Probiotic 11 and protease Refills:  0  
     
   
   
   
  
 VITAMIN D3 1,000 unit Cap Generic drug:  cholecalciferol Your next dose is: Today, Tomorrow Other:  ____________ Take  by mouth daily. Refills:  0 Take these medications as needed Dose & Instructions Dispensing Information Comments Morning Noon Evening Bedtime  
 ondansetron 8 mg disintegrating tablet Commonly known as:  ZOFRAN ODT Your next dose is: Today, Tomorrow Other:  ____________ Dose:  8 mg Take 1 Tab by mouth every eight (8) hours as needed for Nausea. Indications: CANCER CHEMOTHERAPY-INDUCED NAUSEA AND VOMITING Quantity:  30 Tab Refills:  5  
     
   
   
   
  
 prochlorperazine 10 mg tablet Commonly known as:  COMPAZINE Your next dose is: Today, Tomorrow Other:  ____________ Dose:  10 mg Take 1 Tab by mouth every six (6) hours as needed. Indications: CANCER CHEMOTHERAPY-INDUCED NAUSEA AND VOMITING Quantity:  30 Tab Refills:  5 18

## 2023-02-23 NOTE — PROGRESS NOTES
Chief Complaint   Patient presents with    Cardiac Testing     Visit Vitals  /88   Pulse 84   Temp 97.5 °F (36.4 °C)

## 2023-02-27 ENCOUNTER — ANESTHESIA EVENT (OUTPATIENT)
Dept: SURGERY | Age: 58
End: 2023-02-27
Payer: COMMERCIAL

## 2023-02-27 ENCOUNTER — ANESTHESIA (OUTPATIENT)
Dept: SURGERY | Age: 58
End: 2023-02-27
Payer: COMMERCIAL

## 2023-02-27 ENCOUNTER — HOSPITAL ENCOUNTER (OUTPATIENT)
Age: 58
Setting detail: OUTPATIENT SURGERY
Discharge: HOME | End: 2023-02-27
Attending: OTOLARYNGOLOGY | Admitting: OTOLARYNGOLOGY
Payer: COMMERCIAL

## 2023-02-27 VITALS
HEIGHT: 66 IN | HEART RATE: 88 BPM | SYSTOLIC BLOOD PRESSURE: 156 MMHG | DIASTOLIC BLOOD PRESSURE: 95 MMHG | WEIGHT: 211 LBS | TEMPERATURE: 98.2 F | BODY MASS INDEX: 33.91 KG/M2 | OXYGEN SATURATION: 91 % | RESPIRATION RATE: 14 BRPM

## 2023-02-27 DIAGNOSIS — E04.9 GOITER: Primary | ICD-10-CM

## 2023-02-27 PROCEDURE — 77030031139 HC SUT VCRL2 J&J -A: Performed by: OTOLARYNGOLOGY

## 2023-02-27 PROCEDURE — 77030010507 HC ADH SKN DERMBND J&J -B: Performed by: OTOLARYNGOLOGY

## 2023-02-27 PROCEDURE — 77030014008 HC SPNG HEMSTAT J&J -C: Performed by: OTOLARYNGOLOGY

## 2023-02-27 PROCEDURE — 77030008462 HC STPLR SKN PROX J&J -A: Performed by: OTOLARYNGOLOGY

## 2023-02-27 PROCEDURE — 76060000036 HC ANESTHESIA 2.5 TO 3 HR: Performed by: OTOLARYNGOLOGY

## 2023-02-27 PROCEDURE — 74011250637 HC RX REV CODE- 250/637: Performed by: ANESTHESIOLOGY

## 2023-02-27 PROCEDURE — 2709999900 HC NON-CHARGEABLE SUPPLY: Performed by: OTOLARYNGOLOGY

## 2023-02-27 PROCEDURE — 74011250636 HC RX REV CODE- 250/636: Performed by: ANESTHESIOLOGY

## 2023-02-27 PROCEDURE — 88307 TISSUE EXAM BY PATHOLOGIST: CPT

## 2023-02-27 PROCEDURE — 77030010512 HC APPL CLP LIG J&J -C: Performed by: OTOLARYNGOLOGY

## 2023-02-27 PROCEDURE — 77030011267 HC ELECTRD BLD COVD -A: Performed by: OTOLARYNGOLOGY

## 2023-02-27 PROCEDURE — 77030026438 HC STYL ET INTUB CARD -A: Performed by: ANESTHESIOLOGY

## 2023-02-27 PROCEDURE — 77030002996 HC SUT SLK J&J -A: Performed by: OTOLARYNGOLOGY

## 2023-02-27 PROCEDURE — 74011250636 HC RX REV CODE- 250/636: Performed by: NURSE ANESTHETIST, CERTIFIED REGISTERED

## 2023-02-27 PROCEDURE — 77030013079 HC BLNKT BAIR HGGR 3M -A: Performed by: ANESTHESIOLOGY

## 2023-02-27 PROCEDURE — 77030008698 HC TU ET REINF MEDT -D: Performed by: ANESTHESIOLOGY

## 2023-02-27 PROCEDURE — 77030003028 HC SUT VCRL J&J -A: Performed by: OTOLARYNGOLOGY

## 2023-02-27 PROCEDURE — 77030040922 HC BLNKT HYPOTHRM STRY -A

## 2023-02-27 PROCEDURE — 74011000250 HC RX REV CODE- 250: Performed by: OTOLARYNGOLOGY

## 2023-02-27 PROCEDURE — 76210000021 HC REC RM PH II 0.5 TO 1 HR: Performed by: OTOLARYNGOLOGY

## 2023-02-27 PROCEDURE — 74011000250 HC RX REV CODE- 250: Performed by: NURSE ANESTHETIST, CERTIFIED REGISTERED

## 2023-02-27 PROCEDURE — 76210000000 HC OR PH I REC 2 TO 2.5 HR: Performed by: OTOLARYNGOLOGY

## 2023-02-27 PROCEDURE — 77030040361 HC SLV COMPR DVT MDII -B: Performed by: OTOLARYNGOLOGY

## 2023-02-27 PROCEDURE — 77030040356 HC CORD BPLR FRCP COVD -A: Performed by: OTOLARYNGOLOGY

## 2023-02-27 PROCEDURE — 77030016441 HC APPL CLP LIG1 J&J -B: Performed by: OTOLARYNGOLOGY

## 2023-02-27 PROCEDURE — 76010000132 HC OR TIME 2.5 TO 3 HR: Performed by: OTOLARYNGOLOGY

## 2023-02-27 PROCEDURE — 77030008698 HC TU ET REINF MEDT -D: Performed by: OTOLARYNGOLOGY

## 2023-02-27 RX ORDER — ONDANSETRON 2 MG/ML
4 INJECTION INTRAMUSCULAR; INTRAVENOUS AS NEEDED
Status: DISCONTINUED | OUTPATIENT
Start: 2023-02-27 | End: 2023-02-27 | Stop reason: HOSPADM

## 2023-02-27 RX ORDER — MIDAZOLAM HYDROCHLORIDE 1 MG/ML
INJECTION, SOLUTION INTRAMUSCULAR; INTRAVENOUS AS NEEDED
Status: DISCONTINUED | OUTPATIENT
Start: 2023-02-27 | End: 2023-02-27 | Stop reason: HOSPADM

## 2023-02-27 RX ORDER — SODIUM CHLORIDE 0.9 % (FLUSH) 0.9 %
5-40 SYRINGE (ML) INJECTION AS NEEDED
Status: DISCONTINUED | OUTPATIENT
Start: 2023-02-27 | End: 2023-02-27 | Stop reason: HOSPADM

## 2023-02-27 RX ORDER — MIDAZOLAM HYDROCHLORIDE 1 MG/ML
1 INJECTION, SOLUTION INTRAMUSCULAR; INTRAVENOUS AS NEEDED
Status: DISCONTINUED | OUTPATIENT
Start: 2023-02-27 | End: 2023-02-27 | Stop reason: HOSPADM

## 2023-02-27 RX ORDER — ONDANSETRON 2 MG/ML
INJECTION INTRAMUSCULAR; INTRAVENOUS AS NEEDED
Status: DISCONTINUED | OUTPATIENT
Start: 2023-02-27 | End: 2023-02-27 | Stop reason: HOSPADM

## 2023-02-27 RX ORDER — HYDROMORPHONE HYDROCHLORIDE 1 MG/ML
0.2 INJECTION, SOLUTION INTRAMUSCULAR; INTRAVENOUS; SUBCUTANEOUS
Status: DISCONTINUED | OUTPATIENT
Start: 2023-02-27 | End: 2023-02-27 | Stop reason: HOSPADM

## 2023-02-27 RX ORDER — MORPHINE SULFATE 2 MG/ML
2 INJECTION, SOLUTION INTRAMUSCULAR; INTRAVENOUS
Status: DISCONTINUED | OUTPATIENT
Start: 2023-02-27 | End: 2023-02-27 | Stop reason: HOSPADM

## 2023-02-27 RX ORDER — SUCCINYLCHOLINE CHLORIDE 20 MG/ML
INJECTION INTRAMUSCULAR; INTRAVENOUS AS NEEDED
Status: DISCONTINUED | OUTPATIENT
Start: 2023-02-27 | End: 2023-02-27 | Stop reason: HOSPADM

## 2023-02-27 RX ORDER — HYDROCODONE BITARTRATE AND ACETAMINOPHEN 5; 325 MG/1; MG/1
1 TABLET ORAL
Qty: 12 TABLET | Refills: 0 | Status: SHIPPED | OUTPATIENT
Start: 2023-02-27 | End: 2023-03-06

## 2023-02-27 RX ORDER — SODIUM CHLORIDE 9 MG/ML
25 INJECTION, SOLUTION INTRAVENOUS CONTINUOUS
Status: DISCONTINUED | OUTPATIENT
Start: 2023-02-27 | End: 2023-02-27 | Stop reason: HOSPADM

## 2023-02-27 RX ORDER — SODIUM CHLORIDE, SODIUM LACTATE, POTASSIUM CHLORIDE, CALCIUM CHLORIDE 600; 310; 30; 20 MG/100ML; MG/100ML; MG/100ML; MG/100ML
25 INJECTION, SOLUTION INTRAVENOUS CONTINUOUS
Status: DISCONTINUED | OUTPATIENT
Start: 2023-02-27 | End: 2023-02-27 | Stop reason: HOSPADM

## 2023-02-27 RX ORDER — ONDANSETRON 4 MG/1
4 TABLET, ORALLY DISINTEGRATING ORAL
Qty: 8 TABLET | Refills: 1 | Status: SHIPPED | OUTPATIENT
Start: 2023-02-27

## 2023-02-27 RX ORDER — PROPOFOL 10 MG/ML
INJECTION, EMULSION INTRAVENOUS AS NEEDED
Status: DISCONTINUED | OUTPATIENT
Start: 2023-02-27 | End: 2023-02-27 | Stop reason: HOSPADM

## 2023-02-27 RX ORDER — SODIUM CHLORIDE, SODIUM LACTATE, POTASSIUM CHLORIDE, CALCIUM CHLORIDE 600; 310; 30; 20 MG/100ML; MG/100ML; MG/100ML; MG/100ML
INJECTION, SOLUTION INTRAVENOUS
Status: DISCONTINUED | OUTPATIENT
Start: 2023-02-27 | End: 2023-02-27 | Stop reason: HOSPADM

## 2023-02-27 RX ORDER — ACETAMINOPHEN 325 MG/1
650 TABLET ORAL ONCE
Status: COMPLETED | OUTPATIENT
Start: 2023-02-27 | End: 2023-02-27

## 2023-02-27 RX ORDER — DIPHENHYDRAMINE HYDROCHLORIDE 50 MG/ML
12.5 INJECTION, SOLUTION INTRAMUSCULAR; INTRAVENOUS AS NEEDED
Status: DISCONTINUED | OUTPATIENT
Start: 2023-02-27 | End: 2023-02-27 | Stop reason: HOSPADM

## 2023-02-27 RX ORDER — FENTANYL CITRATE 50 UG/ML
INJECTION, SOLUTION INTRAMUSCULAR; INTRAVENOUS AS NEEDED
Status: DISCONTINUED | OUTPATIENT
Start: 2023-02-27 | End: 2023-02-27 | Stop reason: HOSPADM

## 2023-02-27 RX ORDER — FENTANYL CITRATE 50 UG/ML
25 INJECTION, SOLUTION INTRAMUSCULAR; INTRAVENOUS
Status: DISCONTINUED | OUTPATIENT
Start: 2023-02-27 | End: 2023-02-27 | Stop reason: HOSPADM

## 2023-02-27 RX ORDER — SODIUM CHLORIDE, SODIUM LACTATE, POTASSIUM CHLORIDE, CALCIUM CHLORIDE 600; 310; 30; 20 MG/100ML; MG/100ML; MG/100ML; MG/100ML
100 INJECTION, SOLUTION INTRAVENOUS CONTINUOUS
Status: DISCONTINUED | OUTPATIENT
Start: 2023-02-27 | End: 2023-02-27 | Stop reason: HOSPADM

## 2023-02-27 RX ORDER — LIDOCAINE HYDROCHLORIDE AND EPINEPHRINE 10; 10 MG/ML; UG/ML
INJECTION, SOLUTION INFILTRATION; PERINEURAL AS NEEDED
Status: DISCONTINUED | OUTPATIENT
Start: 2023-02-27 | End: 2023-02-27 | Stop reason: HOSPADM

## 2023-02-27 RX ORDER — DEXAMETHASONE SODIUM PHOSPHATE 4 MG/ML
INJECTION, SOLUTION INTRA-ARTICULAR; INTRALESIONAL; INTRAMUSCULAR; INTRAVENOUS; SOFT TISSUE AS NEEDED
Status: DISCONTINUED | OUTPATIENT
Start: 2023-02-27 | End: 2023-02-27 | Stop reason: HOSPADM

## 2023-02-27 RX ORDER — MIDAZOLAM HYDROCHLORIDE 1 MG/ML
0.5 INJECTION, SOLUTION INTRAMUSCULAR; INTRAVENOUS
Status: DISCONTINUED | OUTPATIENT
Start: 2023-02-27 | End: 2023-02-27 | Stop reason: HOSPADM

## 2023-02-27 RX ORDER — LIDOCAINE HYDROCHLORIDE 20 MG/ML
INJECTION, SOLUTION EPIDURAL; INFILTRATION; INTRACAUDAL; PERINEURAL AS NEEDED
Status: DISCONTINUED | OUTPATIENT
Start: 2023-02-27 | End: 2023-02-27 | Stop reason: HOSPADM

## 2023-02-27 RX ORDER — HYDROMORPHONE HYDROCHLORIDE 2 MG/ML
INJECTION, SOLUTION INTRAMUSCULAR; INTRAVENOUS; SUBCUTANEOUS AS NEEDED
Status: DISCONTINUED | OUTPATIENT
Start: 2023-02-27 | End: 2023-02-27 | Stop reason: HOSPADM

## 2023-02-27 RX ORDER — ROPIVACAINE HYDROCHLORIDE 5 MG/ML
30 INJECTION, SOLUTION EPIDURAL; INFILTRATION; PERINEURAL AS NEEDED
Status: DISCONTINUED | OUTPATIENT
Start: 2023-02-27 | End: 2023-02-27 | Stop reason: HOSPADM

## 2023-02-27 RX ORDER — SODIUM CHLORIDE 0.9 % (FLUSH) 0.9 %
5-40 SYRINGE (ML) INJECTION EVERY 8 HOURS
Status: DISCONTINUED | OUTPATIENT
Start: 2023-02-27 | End: 2023-02-27 | Stop reason: HOSPADM

## 2023-02-27 RX ORDER — FENTANYL CITRATE 50 UG/ML
50 INJECTION, SOLUTION INTRAMUSCULAR; INTRAVENOUS AS NEEDED
Status: DISCONTINUED | OUTPATIENT
Start: 2023-02-27 | End: 2023-02-27 | Stop reason: HOSPADM

## 2023-02-27 RX ORDER — OXYCODONE HYDROCHLORIDE 5 MG/1
5 TABLET ORAL AS NEEDED
Status: DISCONTINUED | OUTPATIENT
Start: 2023-02-27 | End: 2023-02-27 | Stop reason: HOSPADM

## 2023-02-27 RX ORDER — PHENYLEPHRINE HCL IN 0.9% NACL 0.4MG/10ML
SYRINGE (ML) INTRAVENOUS AS NEEDED
Status: DISCONTINUED | OUTPATIENT
Start: 2023-02-27 | End: 2023-02-27 | Stop reason: HOSPADM

## 2023-02-27 RX ORDER — CEFAZOLIN SODIUM 1 G/3ML
INJECTION, POWDER, FOR SOLUTION INTRAMUSCULAR; INTRAVENOUS AS NEEDED
Status: DISCONTINUED | OUTPATIENT
Start: 2023-02-27 | End: 2023-02-27 | Stop reason: HOSPADM

## 2023-02-27 RX ORDER — LIDOCAINE HYDROCHLORIDE 10 MG/ML
0.1 INJECTION, SOLUTION EPIDURAL; INFILTRATION; INTRACAUDAL; PERINEURAL AS NEEDED
Status: DISCONTINUED | OUTPATIENT
Start: 2023-02-27 | End: 2023-02-27 | Stop reason: HOSPADM

## 2023-02-27 RX ADMIN — MIDAZOLAM 2 MG: 1 INJECTION INTRAMUSCULAR; INTRAVENOUS at 07:25

## 2023-02-27 RX ADMIN — FENTANYL CITRATE 50 MCG: 50 INJECTION, SOLUTION INTRAMUSCULAR; INTRAVENOUS at 09:11

## 2023-02-27 RX ADMIN — Medication 120 MCG: at 09:20

## 2023-02-27 RX ADMIN — FENTANYL CITRATE 50 MCG: 50 INJECTION, SOLUTION INTRAMUSCULAR; INTRAVENOUS at 07:55

## 2023-02-27 RX ADMIN — HYDROMORPHONE HYDROCHLORIDE 0.6 MG: 2 INJECTION, SOLUTION INTRAMUSCULAR; INTRAVENOUS; SUBCUTANEOUS at 07:58

## 2023-02-27 RX ADMIN — DEXAMETHASONE SODIUM PHOSPHATE 4 MG: 4 INJECTION, SOLUTION INTRAMUSCULAR; INTRAVENOUS at 07:50

## 2023-02-27 RX ADMIN — CEFAZOLIN 2 G: 330 INJECTION, POWDER, FOR SOLUTION INTRAMUSCULAR; INTRAVENOUS at 07:50

## 2023-02-27 RX ADMIN — Medication 120 MCG: at 08:16

## 2023-02-27 RX ADMIN — FENTANYL CITRATE 50 MCG: 50 INJECTION, SOLUTION INTRAMUSCULAR; INTRAVENOUS at 07:33

## 2023-02-27 RX ADMIN — ONDANSETRON HYDROCHLORIDE 4 MG: 2 INJECTION, SOLUTION INTRAMUSCULAR; INTRAVENOUS at 09:29

## 2023-02-27 RX ADMIN — ACETAMINOPHEN 650 MG: 325 TABLET ORAL at 06:57

## 2023-02-27 RX ADMIN — SODIUM CHLORIDE, POTASSIUM CHLORIDE, SODIUM LACTATE AND CALCIUM CHLORIDE: 600; 310; 30; 20 INJECTION, SOLUTION INTRAVENOUS at 07:25

## 2023-02-27 RX ADMIN — PROPOFOL 50 MG: 10 INJECTION, EMULSION INTRAVENOUS at 09:04

## 2023-02-27 RX ADMIN — LIDOCAINE HYDROCHLORIDE 100 MG: 20 INJECTION, SOLUTION EPIDURAL; INFILTRATION; INTRACAUDAL; PERINEURAL at 07:33

## 2023-02-27 RX ADMIN — Medication 80 MCG: at 08:08

## 2023-02-27 RX ADMIN — SODIUM CHLORIDE, POTASSIUM CHLORIDE, SODIUM LACTATE AND CALCIUM CHLORIDE 25 ML/HR: 600; 310; 30; 20 INJECTION, SOLUTION INTRAVENOUS at 06:51

## 2023-02-27 RX ADMIN — SUCCINYLCHOLINE CHLORIDE 160 MG: 20 INJECTION, SOLUTION INTRAMUSCULAR; INTRAVENOUS at 07:55

## 2023-02-27 RX ADMIN — PROPOFOL 50 MG: 10 INJECTION, EMULSION INTRAVENOUS at 08:59

## 2023-02-27 RX ADMIN — HYDROMORPHONE HYDROCHLORIDE 0.4 MG: 2 INJECTION, SOLUTION INTRAMUSCULAR; INTRAVENOUS; SUBCUTANEOUS at 10:03

## 2023-02-27 RX ADMIN — Medication 80 MCG: at 08:05

## 2023-02-27 RX ADMIN — FENTANYL CITRATE 50 MCG: 50 INJECTION, SOLUTION INTRAMUSCULAR; INTRAVENOUS at 08:56

## 2023-02-27 RX ADMIN — PROPOFOL 200 MG: 10 INJECTION, EMULSION INTRAVENOUS at 07:33

## 2023-02-27 NOTE — ANESTHESIA POSTPROCEDURE EVALUATION
Post-Anesthesia Evaluation and Assessment    Patient: Jayden Rock MRN: [id number]  SSN: [id number]    YOB: 1965  Age: 62 y.o. Sex: female      I have evaluated the patient and they are stable and ready for discharge from the PACU. Cardiovascular Function/Vital Signs  Visit Vitals  BP (!) 163/106   Pulse 92   Temp 36.3 °C (97.4 °F)   Resp 18   Ht 5' 6\" (1.676 m)   Wt 95.7 kg (211 lb)   SpO2 96%   BMI 34.06 kg/m²       Patient is status post General anesthesia for Procedure(s):  TOTAL LEFT THYROID LOBECTOMY WITH ISTHMUSECTOMY. Nausea/Vomiting: None    Postoperative hydration reviewed and adequate. Pain:  Pain Scale 1: Numeric (0 - 10) (02/27/23 0612)  Pain Intensity 1: 0 (02/27/23 0612)   Managed    Neurological Status:   Neuro (WDL): Within Defined Limits (02/27/23 0701)   At baseline    Mental Status, Level of Consciousness: Alert and  oriented to person, place, and time    Pulmonary Status:   O2 Device: CO2 nasal cannula (02/27/23 1003)   Adequate oxygenation and airway patent    Complications related to anesthesia: None    Post-anesthesia assessment completed. No concerns    Signed By: Winter Edwards MD     February 27, 2023              Procedure(s):  TOTAL LEFT THYROID LOBECTOMY WITH ISTHMUSECTOMY. general    <BSHSIANPOST>    INITIAL Post-op Vital signs:   Vitals Value Taken Time   /106 02/27/23 1100   Temp 36.3 °C (97.4 °F) 02/27/23 1003   Pulse 76 02/27/23 1102   Resp 11 02/27/23 1102   SpO2 98 % 02/27/23 1102   Vitals shown include unvalidated device data.

## 2023-02-27 NOTE — BRIEF OP NOTE
Brief Postoperative Note    Patient: Leanne Albarran  YOB: 1965  MRN: [id number]    Date of Procedure: 2/27/2023     Pre-Op Diagnosis: Thyroid nodule [E04.1]    Post-Op Diagnosis: Same as preoperative diagnosis.       Procedure(s):  TOTAL LEFT THYROID LOBECTOMY WITH ISTHMUSECTOMY, intraop recurrent laryngeal nerve monitoring    Surgeon(s):  Nicholas Blanca MD    Surgical Assistant: Surg Asst-1: Juanita Linda    Anesthesia: General     Estimated Blood Loss (mL): 44AQ    Complications: None    Specimens:   ID Type Source Tests Collected by Time Destination   1 : Left hemithyroid Fresh Thyroid lobe  Nicholas Blanca MD 2/27/2023 3874 Pathology        Implants: * No implants in log *    Drains:   Angel-Lopez Drain 02/27/23 Anterior Neck (Active)       Findings: large goiter removed    Electronically Signed by Nettie Blanca IV, MD on 2/27/2023 at 9:56 AM

## 2023-02-27 NOTE — DISCHARGE INSTRUCTIONS
[facility]    Head and Neck Post Operative Instructions     DIET  Start a soft diet and progress to usual diet as tolerated, unless otherwise directed. It is important to remember that good overall diet and health promotes healing. ACTIVITY  No heavy exertion or heavy lifting for 10 days. Light activities are permitted but, avoid any stretching or bending of the neck for 10 days. WOUND CARE  Keep incision clean and dry. THINGS TO BE CONCERNED ABOUT  Please call the office for any of these changes   Increasing pain  New red discoloration  New drainage of any description  Increasing warmth of wound     DRAIN MANAGEMENT   Follow nursing instructions  Generally, your doctor will plan to have drains removed between 2 and 5 days. LONG-TERM WOUND HEALING   Expect the wound to have a slight ridge for up to 6 weeks. This is usually by design for optimal wound healing. Local numbness is usual around wound sites, and can last up to and beyond 3 months. To minimize the prolonged redness or pigmentation around a wound, we recommend use of sunscreen for 3 months or longer on healed wounds. If you have any questions or concerns following your surgery, do not hesitate to contact our office at    Office Phone:  [phone] office hours are 8:00 a.m. to 4:30 p.m. You should be able to reach us after hours by calling the regular office number.   If for some reason you are not able to reach our [address] [place] [address] service through this main number you may call them directly at [phone].     ______________________________________________________________________    Anesthesia Discharge Instructions    After general anesthesia or intervenous sedation, for 24 hours or while taking prescription Narcotics:  Limit your activities  Do not drive or operate hazardous machinery  If you have not urinated within 8 hours after discharge, please contact your surgeon on call. Do not make important personal or business decisions  Do not drink alcoholic beverages    Report the following to your surgeon:  Excessive pain, swelling, redness or odor of or around the surgical area  Temperature over 100.5 degrees  Nausea and vomiting lasting longer than 4 hours or if unable to take medication  Any signs of decreased circulation or nerve impairment to extremity:  Change in color, persistent numbness, tingling, coldness or increased pain.

## 2023-02-27 NOTE — PROGRESS NOTES
02/27/23 0819   Family Communication   Family Update Message Procedure started   Delivery Origin Surgeon    Relationship to Patient Other (Comment)    Phone Number [address]   Family/Significant Other Update Called

## 2023-02-27 NOTE — PERIOP NOTES
PACU DISCHARGE NOTE    Vital signs stable, pain well controlled, alert and oriented times three or at baseline, follow up per surgeon, no anesthetic complications. I have reviewed discharge instructions with the patient and spouse. The patient and spouse verbalized understanding. Discharge medications reviewed with patient and spouse and appropriate educational materials and side effects teaching were provided.  Home or Self Care

## 2023-02-27 NOTE — ANESTHESIA PREPROCEDURE EVALUATION
Relevant Problems   HEMATOLOGY   (+) Anemia      PERSONAL HX & FAMILY HX OF CANCER   (+) Breast CA (HCC)   (+) Breast cancer metastasized to axillary lymph node (HCC)   (+) Breast cancer of upper-outer quadrant of right female breast (HCC)       Anesthetic History   No history of anesthetic complications            Review of Systems / Medical History  Patient summary reviewed, nursing notes reviewed and pertinent labs reviewed    Pulmonary  Within defined limits                 Neuro/Psych   Within defined limits           Cardiovascular  Within defined limits                Exercise tolerance: >4 METS     GI/Hepatic/Renal                Endo/Other        Obesity and cancer (breast)    Comments: Goiter Other Findings              Physical Exam    Airway  Mallampati: III  TM Distance: 4 - 6 cm  Neck ROM: normal range of motion   Mouth opening: Normal     Cardiovascular  Regular rate and rhythm,  S1 and S2 normal,  no murmur, click, rub, or gallop             Dental    Dentition: Poor dentition     Pulmonary  Breath sounds clear to auscultation               Abdominal  GI exam deferred       Other Findings            Anesthetic Plan    ASA: 2  Anesthesia type: general          Induction: Intravenous  Anesthetic plan and risks discussed with: Patient

## 2023-02-27 NOTE — OP NOTES
[address] [facility]  OPERATIVE REPORT    Name:  Ana Valdez  MR#:  [id number]  :  1965  ACCOUNT #:  [de-identified]  DATE OF SERVICE:  2023    PREOPERATIVE DIAGNOSIS:  Left thyroid nodule. POSTOPERATIVE DIAGNOSIS:  Left thyroid nodule. PROCEDURE PERFORMED:  1. Left hemithyroidectomy. 2.  Intraoperative recurrent laryngeal nerve monitoring. SURGEON:  Karina Hoffman MD    ASSISTANT:  ***    ANESTHESIA:  General endotracheal.    COMPLICATIONS:  None. SPECIMENS REMOVED:  ***    IMPLANTS:  ***    ESTIMATED BLOOD LOSS:  50 mL. INDICATIONS:  This is a 49-year-old female with a history of a left-sided thyroid nodule previous workup including FNA was benign. However, the nodule continued to enlarge such that she had developed compressive symptoms. Based on this, she was offered elective hemithyroidectomy. Risks of surgery discussed include bleeding, infection, pain, voice change, recurrent nerve injury, dysphagia, and need for further surgery. PROCEDURE:  The patient was brought to the operating room and general anesthesia was induced. The patient was intubated with a nerve integrity monitor tube. The neck was then prepped and draped in usual sterile fashion. An incision was designed in an existing skin crease in the neck just below the cricoid. This was infiltrated with 1% lidocaine with epinephrine 1:100,000. The incision was made with a knife and taken down through the platysma. Subplatysmal skin flaps were raised. Midline raphe of strap muscles was identified and was pushed over to the right. It was carefully split. We could then begin freeing the straps on the left thyroid lobe. The left thyroid lobe was significantly enlarged having pushed the airway to the right. The pyramidal lobe was identified, dissected free and left intact on the isthmus. The isthmus was then split. The thyroid was then carefully freed from the trachea.   The superior pole was then addressed. The straps carefully freed from the thyroid in this region. The superior laryngeal nerve was visualized and carefully preserved. There were multiple large vessels in the superior pole that were ultimately controlled with clips. Dissection was then continued on the lateral and inferior aspects of the lobe. The straps were somewhat adherent in some places. Calli Birch Ultimately, blunt dissection was used to completely mobilize the lobe so that it could be delivered from the incision. This provided visualization of the deep aspect of the thyroid, which had been wrapping around the larynx and was lying on the spine. The recurrent nerve was identified near its insertion into the larynx. It was carefully dissected off of the thyroid and preserved. Huggins's ligament was then released. A remainder of the fibrous attachments more inferiorly were freed releasing the specimen. It was sent for permanent section. The wound was irrigated, evacuated. Bipolar was used for hemostasis. The superior parathyroid was visualized and appeared intact. The Prass probe was used to stimulate the recurrent nerve at 0.8 mA confirming its integrity. Attention was turned towards closure. A 7-flat GARRET was brought out through a separate stab incision in the neck and secured. Surgicel was placed in the wound. The wound was then closed using 3-0 Vicryl to reapproximate the straps in midline, 3-0 Vicryl to close platysma, and a running 4-0 Monocryl subcuticular. The skin was dressed with Dermabond. At this time, the procedure was terminated. The patient was awakened, extubated and taken to PACU in stable condition.       MD YURI [email or web address]  D:  02/27/2023 10:03  T:  02/27/2023 11:14  JOB #:  [id number]

## 2023-03-06 ENCOUNTER — OFFICE VISIT (OUTPATIENT)
Dept: INTERNAL MEDICINE CLINIC | Age: 58
End: 2023-03-06
Payer: COMMERCIAL

## 2023-03-06 VITALS
HEIGHT: 66 IN | TEMPERATURE: 98.2 F | OXYGEN SATURATION: 97 % | WEIGHT: 206.2 LBS | SYSTOLIC BLOOD PRESSURE: 140 MMHG | RESPIRATION RATE: 16 BRPM | DIASTOLIC BLOOD PRESSURE: 92 MMHG | HEART RATE: 89 BPM | BODY MASS INDEX: 33.14 KG/M2

## 2023-03-06 DIAGNOSIS — R22.2 SUBCUTANEOUS MASS OF BACK: Primary | ICD-10-CM

## 2023-03-06 DIAGNOSIS — Z98.890 S/P THYROID SURGERY: ICD-10-CM

## 2023-03-06 PROCEDURE — 99213 OFFICE O/P EST LOW 20 MIN: CPT | Performed by: INTERNAL MEDICINE

## 2023-03-06 NOTE — PROGRESS NOTES
RM 18    Chief Complaint   Patient presents with    Mass     Patient noticed lump in the center of her back 6 months ago would like for it to be examined     Follow-up     Patient would like more information on thyroid surgery        Visit Vitals  BP (!) 140/92 (BP 1 Location: Left upper arm, BP Patient Position: Sitting, BP Cuff Size: Adult long)   Pulse 89   Temp 98.2 °F (36.8 °C) (Oral)   Resp 16   Ht 5' 6\" (1.676 m)   Wt 206 lb 3.2 oz (93.5 kg)   SpO2 97%   BMI 33.28 kg/m²       3 most recent PHQ Screens 3/6/2023   Little interest or pleasure in doing things Not at all   Feeling down, depressed, irritable, or hopeless Not at all   Total Score PHQ 2 0       1. \"Have you been to the ER, urgent care clinic since your last visit? Hospitalized since your last visit? \"  Seen in hospital for surgery 2/27/2023    2. \"Have you seen or consulted any other health care providers outside of the [address] [place] [address] since your last visit? \" Seen in hospital for surgery 2/27/2023     3. For patients aged 39-70: Has the patient had a colonoscopy / FIT/ Cologuard? Yes - Care Gap present. Most recent result on file      If the patient is female:    4. For patients aged 41-77: Has the patient had a mammogram within the past 2 years? No      5. For patients aged 21-65: Has the patient had a pap smear? Yes - Care Gap present. Most recent result on file     Health Maintenance Due   Topic Date Due    COVID-19 Vaccine (1) Never done    Pneumococcal 0-64 years (1 - PCV) Never done    Shingles Vaccine (1 of 2) Never done    DTaP/Tdap/Td series (1 - Tdap) Never done    Lipid Screen  Never done    Flu Vaccine (1) Never done       Learning Assessment 9/23/2020   PRIMARY LEARNER Patient   PRIMARY LANGUAGE ENGLISH   LEARNER PREFERENCE PRIMARY DEMONSTRATION   ANSWERED BY PATIENT   RELATIONSHIP SELF       AVS  education, follow up, and recommendations provided and addressed with patient.   services used to advise patient No

## 2023-03-06 NOTE — PROGRESS NOTES
Briana Mora (: 1965) is a 62 y.o. female, established patient, here for evaluation of the following chief complaint(s):  Chief Complaint   Patient presents with    Mass     Patient noticed lump in the center of her back 6 months ago would like for it to be examined     Follow-up     Patient would like more information on thyroid surgery        Assessment and Plan:       ICD-10-CM ICD-9-CM    1. Subcutaneous mass of back--upper right thoracic paraspinal--exam c/w lipoma. R22.2 782.2 REFERRAL TO GENERAL SURGERY      2. S/P thyroid surgery  Z98.890 V45.89         Follow-up and Dispositions    Return if symptoms worsen or fail to improve, for medication follow-up with PCP as scheduled. reviewed medications and side effects in detail    Plan and evaluation (above) reviewed with pt at visit  Patient voiced understanding of plan and provided with time to ask/review questions. After Visit Summary (AVS) provided to pt after visit with additional instructions as needed/reviewed. Future Appointments   Date Time Provider Rashard Jones   3/27/2023  7:00 AM [facility] STEREO 1 [facility] REG   2023  8:00 AM Alyssa ROGERS NP [facility] BS AMB   2023  9:00 AM Ruel Handy NP [facility] BS AMB     --Updated future visits after patient check-out. History of Present Illness:     Notes (nursing/rooming note copied below in italics):  As above    PCP:  Bere Palomo NP    Here to evaluate lump in back. LOV with PCP was preop 2023 for thyroid nodule surgery. Had procedure 23. Path:  FINAL PATHOLOGIC DIAGNOSIS  Left lobe thyroid, left hemithyroidectomy:        Hyperplastic colloid nodule. Has not had back mass evaluated here. She notes daughter just saw in her back   She has not had pain or change in size with mass over 6mo. She has not had prior eval.    Had some skin lumps removed from axillary area when child, but no other skin lumps or problems.   No prior lipomas or other masses of neck/back/skin. Notes no drainage, or skin change. She had questions about post-op wound care that she had reviewed with surgery/ENT last week. She didn't know if needed to have specific wound care regarding showering or other care. Reviewed should call ENT with questions. She noted she had but had not heard from them. She states asked at visit last week but didn't get clear response. Printed prior operative AVS from 2-28-23 and provided to pt at visit. Vitals 3/6/2023 3/6/2023 2/27/2023 2/27/2023 2/27/2023   Blood Pressure 140/92 137/94  156/95 161/111   Pulse  89 88 81 77     Vitals 2/27/2023 2/27/2023   Blood Pressure 160/102 163/106   Pulse 84 92     Vitals 2/23/2023 2/22/2023 2/8/2023 11/7/2022 4/6/2022   Blood Pressure 127/88  137/87  119/83   Pulse 84  80  88     Vitals 11/1/2021 9/23/2020   Blood Pressure 136/88 146/94   Pulse 88 83       She notes can and will monitor BP at home, since elevated here and as above. She will return sooner if home values elevated to see PCP or me as reviewed. Nursing screenings reviewed by provider at visit. Prior to Admission medications    Medication Sig Start Date End Date Taking? Authorizing Provider   ondansetron (ZOFRAN ODT) 4 mg disintegrating tablet Take 1 Tablet by mouth every eight (8) hours as needed for Nausea or Vomiting. Patient not taking: Reported on 3/6/2023 2/27/23   May, Josh John MD   HYDROcodone-acetaminophen [facility]) 5-325 mg per tablet Take 1 Tablet by mouth every six (6) hours as needed for Pain for up to 7 days. Max Daily Amount: 4 Tablets. Patient not taking: Reported on 3/6/2023 2/27/23 3/6/23  Lucille Prieto MD   cholecalciferol (VITAMIN D3) 25 mcg (1,000 unit) cap Take  by mouth daily.   Patient not taking: Reported on 3/6/2023    Provider, Historical        ROS    Vitals:    03/06/23 0850 03/06/23 0852   BP: (!) 137/94 (!) 140/92   Pulse: 89    Resp: 16    Temp: 98.2 °F (36.8 °C)    TempSrc: Oral    SpO2: 97%    Weight: 206 lb 3.2 oz (93.5 kg)    Height: 5' 6\" (1.676 m)    PainSc:   0 - No pain    LMP: 06/24/2016      Body mass index is 33.28 kg/m². Physical Exam:     Physical Exam  Vitals and nursing note reviewed. Constitutional:       General: She is not in acute distress. Appearance: Normal appearance. She is well-developed. She is not diaphoretic. HENT:      Head: Normocephalic and atraumatic. Mouth/Throat:      Mouth: Mucous membranes are moist.   Eyes:      General: No scleral icterus. Right eye: No discharge. Left eye: No discharge. Conjunctiva/sclera: Conjunctivae normal.   Neck:     Cardiovascular:      Rate and Rhythm: Normal rate and regular rhythm. Pulses: Normal pulses. Heart sounds: Normal heart sounds. No murmur heard. No friction rub. No gallop. Pulmonary:      Effort: Pulmonary effort is normal. No respiratory distress. Breath sounds: Normal breath sounds. No stridor. No wheezing, rhonchi or rales. Abdominal:      General: There is no distension. Musculoskeletal:         General: No swelling, tenderness, deformity or signs of injury. Arms:       Comments: Nurse chaperone (TC) for back/skin exam.     Skin:     General: Skin is warm. Coloration: Skin is not jaundiced or pale. Findings: No bruising, erythema or rash. Neurological:      General: No focal deficit present. Mental Status: She is alert. Motor: No abnormal muscle tone. Coordination: Coordination normal.      Gait: Gait normal.   Psychiatric:         Mood and Affect: Mood normal.         Behavior: Behavior normal.         Thought Content: Thought content normal.         Judgment: Judgment normal.       An electronic signature was used to authenticate this note.   -- Scottie Anderson MD

## 2023-03-20 ENCOUNTER — OFFICE VISIT (OUTPATIENT)
Dept: SURGERY | Age: 58
End: 2023-03-20
Payer: COMMERCIAL

## 2023-03-20 ENCOUNTER — HOSPITAL ENCOUNTER (OUTPATIENT)
Age: 58
Setting detail: OUTPATIENT SURGERY
End: 2023-03-20
Attending: SURGERY | Admitting: SURGERY
Payer: COMMERCIAL

## 2023-03-20 DIAGNOSIS — R22.2 MASS OF SUBCUTANEOUS TISSUE OF BACK: Primary | ICD-10-CM

## 2023-03-20 PROCEDURE — 99202 OFFICE O/P NEW SF 15 MIN: CPT | Performed by: SURGERY

## 2023-03-20 NOTE — PROGRESS NOTES
Identified pt with two pt identifiers(name and ). Reviewed record in preparation for visit and have obtained necessary documentation. All patient medications has been reviewed. Chief Complaint   Patient presents with    Mass     Seen at the request of Sybil horton subcutaneous mass of back       Health Maintenance Due   Topic    COVID-19 Vaccine (1)    Pneumococcal 0-64 years (1 - PCV)    Shingles Vaccine (1 of 2)    DTaP/Tdap/Td series (1 - Tdap)    Lipid Screen     Flu Vaccine (1)       Vitals:    23 1547   BP: 135/84   Pulse: 85   Resp: 20   Temp: 97.3 °F (36.3 °C)   TempSrc: Temporal   SpO2: 97%   Weight: 95.3 kg (210 lb)   Height: 5' 6\" (1.676 m)   PainSc:   0 - No pain   LMP: 2016       4. Have you been to the ER, urgent care clinic since your last visit? Hospitalized since your last visit? No    5. Have you seen or consulted any other health care providers outside of the [address] [place] [address] since your last visit? Include any pap smears or colon screening. No      Patient is accompanied by self I have received verbal consent from Jose Guadalupe Desai to discuss any/all medical information while they are present in the room.

## 2023-03-27 ENCOUNTER — HOSPITAL ENCOUNTER (OUTPATIENT)
Dept: MAMMOGRAPHY | Age: 58
Discharge: HOME | End: 2023-03-27
Attending: SURGERY
Payer: COMMERCIAL

## 2023-03-27 DIAGNOSIS — Z85.3 HISTORY OF RIGHT BREAST CANCER: ICD-10-CM

## 2023-03-27 PROCEDURE — 77063 BREAST TOMOSYNTHESIS BI: CPT

## 2023-03-31 NOTE — PERIOP NOTES
[facility]  Preoperative Instructions        Surgery Date 4/5/2023    Time of Arrival To Be Called  Contact# [phone]     1. On the day of your surgery, please report to the Surgical Services Registration Desk and sign in at your designated time. The Surgery Center is located to the right of the Emergency Room. 2. You must have someone with you to drive you home. You should not drive a car for 24 hours following surgery. Please make arrangements for a friend or family member to stay with you for the first 24 hours after your surgery. 3. Do not have anything to eat or drink (including water, gum, mints, coffee, juice) after midnight  4/4/2023 . ? This may not apply to medications prescribed by your physician. ?(Please note below the special instructions with medications to take the morning of your procedure.)    4. We recommend you do not drink any alcoholic beverages for 24 hours before and after your surgery. 5. Contact your surgeons office for instructions on the following medications: non-steroidal anti-inflammatory drugs (i.e. Advil, Aleve), vitamins, and supplements. (Some surgeons will want you to stop these medications prior to surgery and others may allow you to take them)  **If you are currently taking Plavix, Coumadin, Aspirin and/or other blood-thinning agents, contact your surgeon for instructions. ** Your surgeon will partner with the physician prescribing these medications to determine if it is safe to stop or if you need to continue taking. Please do not stop taking these medications without instructions from your surgeon    6. Wear comfortable clothes. Wear glasses instead of contacts. Do not bring any money or jewelry. Please bring picture ID, insurance card, and any prearranged co-payment or hospital payment. Do not wear make-up, particularly mascara the morning of your surgery. Do not wear nail polish, particularly if you are having foot /hand surgery. Wear your hair loose or down, no ponytails, buns, remberto pins or clips. All body piercings must be removed. Please shower with antibacterial soap for three consecutive days before and on the morning of surgery, but do not apply any lotions, powders or deodorants after the shower on the day of surgery. Please use a fresh towels after each shower. Please sleep in clean clothes and change bed linens the night before surgery. Please do not shave for 48 hours prior to surgery. Shaving of the face is acceptable. 7. You should understand that if you do not follow these instructions your surgery may be cancelled. If your physical condition changes (I.e. fever, cold or flu) please contact your surgeon as soon as possible. 8. It is important that you be on time. If a situation occurs where you may be late, please call [phone] (OR Holding Area). 9. If you have any questions and or problems, please call [phone] (Pre-admission Testing). 10. Your surgery time may be subject to change. You will receive a phone call the evening prior if your time changes. 11.  If having outpatient surgery, you must have someone to drive you here, stay with you during the duration of your stay, and to drive you home at time of discharge. TAKE ALL MEDICATIONS DAY OF SURGERY EXCEPT:  vitamins or supplements      I understand a pre-operative phone call will be made to verify my surgery time. In the event that I am not available, I give permission for a message to be left on my answering service and/or with another person?   yes         ___________________      __________   _________    (Signature of Patient)             (Witness)                (Date and Time)

## 2023-04-05 ENCOUNTER — ANESTHESIA (OUTPATIENT)
Dept: SURGERY | Age: 58
End: 2023-04-05
Payer: COMMERCIAL

## 2023-04-05 ENCOUNTER — ANESTHESIA EVENT (OUTPATIENT)
Dept: SURGERY | Age: 58
End: 2023-04-05
Payer: COMMERCIAL

## 2023-04-05 PROBLEM — R22.2 MASS OF SUBCUTANEOUS TISSUE OF BACK: Status: ACTIVE | Noted: 2023-04-05

## 2023-04-05 PROCEDURE — 77030008684 HC TU ET CUF COVD -B: Performed by: ANESTHESIOLOGY

## 2023-04-05 PROCEDURE — 2709999900 HC NON-CHARGEABLE SUPPLY: Performed by: SURGERY

## 2023-04-05 PROCEDURE — 74011250636 HC RX REV CODE- 250/636: Performed by: NURSE ANESTHETIST, CERTIFIED REGISTERED

## 2023-04-05 PROCEDURE — 74011250636 HC RX REV CODE- 250/636: Performed by: ANESTHESIOLOGY

## 2023-04-05 PROCEDURE — 74011000250 HC RX REV CODE- 250: Performed by: SURGERY

## 2023-04-05 PROCEDURE — 77030026438 HC STYL ET INTUB CARD -A: Performed by: ANESTHESIOLOGY

## 2023-04-05 PROCEDURE — 77030002933 HC SUT MCRYL J&J -A: Performed by: SURGERY

## 2023-04-05 PROCEDURE — 74011000250 HC RX REV CODE- 250: Performed by: NURSE ANESTHETIST, CERTIFIED REGISTERED

## 2023-04-05 PROCEDURE — 74011250637 HC RX REV CODE- 250/637: Performed by: SURGERY

## 2023-04-05 PROCEDURE — 76010000149 HC OR TIME 1 TO 1.5 HR: Performed by: SURGERY

## 2023-04-05 PROCEDURE — 77030019908 HC STETH ESOPH SIMS -A: Performed by: ANESTHESIOLOGY

## 2023-04-05 PROCEDURE — 76210000000 HC OR PH I REC 2 TO 2.5 HR: Performed by: SURGERY

## 2023-04-05 PROCEDURE — 77030031139 HC SUT VCRL2 J&J -A: Performed by: SURGERY

## 2023-04-05 PROCEDURE — 76060000033 HC ANESTHESIA 1 TO 1.5 HR: Performed by: SURGERY

## 2023-04-05 PROCEDURE — 74011250636 HC RX REV CODE- 250/636: Performed by: SURGERY

## 2023-04-05 PROCEDURE — 77030003028 HC SUT VCRL J&J -A: Performed by: SURGERY

## 2023-04-05 RX ORDER — DEXMEDETOMIDINE HYDROCHLORIDE 100 UG/ML
INJECTION, SOLUTION INTRAVENOUS AS NEEDED
Status: DISCONTINUED
Start: 2023-04-05 | End: 2023-04-05 | Stop reason: HOSPADM

## 2023-04-05 RX ORDER — MIDAZOLAM HYDROCHLORIDE 1 MG/ML
INJECTION, SOLUTION INTRAMUSCULAR; INTRAVENOUS AS NEEDED
Status: DISCONTINUED
Start: 2023-04-05 | End: 2023-04-05 | Stop reason: HOSPADM

## 2023-04-05 RX ORDER — FENTANYL CITRATE 50 UG/ML
INJECTION, SOLUTION INTRAMUSCULAR; INTRAVENOUS AS NEEDED
Status: DISCONTINUED
Start: 2023-04-05 | End: 2023-04-05 | Stop reason: HOSPADM

## 2023-04-05 RX ORDER — SODIUM CHLORIDE 0.9 % (FLUSH) 0.9 %
5-40 SYRINGE (ML) INJECTION EVERY 8 HOURS
Start: 2023-04-05

## 2023-04-05 RX ORDER — FENTANYL CITRATE 50 UG/ML
25 INJECTION, SOLUTION INTRAMUSCULAR; INTRAVENOUS
Start: 2023-04-05

## 2023-04-05 RX ORDER — SODIUM CHLORIDE 0.9 % (FLUSH) 0.9 %
5-40 SYRINGE (ML) INJECTION AS NEEDED
Status: DISCONTINUED
Start: 2023-04-05 | End: 2023-04-05 | Stop reason: HOSPADM

## 2023-04-05 RX ORDER — PROPOFOL 10 MG/ML
INJECTION, EMULSION INTRAVENOUS AS NEEDED
Status: DISCONTINUED
Start: 2023-04-05 | End: 2023-04-05 | Stop reason: HOSPADM

## 2023-04-05 RX ORDER — ONDANSETRON 2 MG/ML
INJECTION INTRAMUSCULAR; INTRAVENOUS AS NEEDED
Status: DISCONTINUED
Start: 2023-04-05 | End: 2023-04-05 | Stop reason: HOSPADM

## 2023-04-05 RX ORDER — BUPIVACAINE HYDROCHLORIDE 5 MG/ML
INJECTION, SOLUTION EPIDURAL; INTRACAUDAL AS NEEDED
Status: DISCONTINUED
Start: 2023-04-05 | End: 2023-04-05 | Stop reason: HOSPADM

## 2023-04-05 RX ORDER — DEXAMETHASONE SODIUM PHOSPHATE 4 MG/ML
INJECTION, SOLUTION INTRA-ARTICULAR; INTRALESIONAL; INTRAMUSCULAR; INTRAVENOUS; SOFT TISSUE AS NEEDED
Status: DISCONTINUED
Start: 2023-04-05 | End: 2023-04-05 | Stop reason: HOSPADM

## 2023-04-05 RX ORDER — HYDROMORPHONE HYDROCHLORIDE 1 MG/ML
0.2 INJECTION, SOLUTION INTRAMUSCULAR; INTRAVENOUS; SUBCUTANEOUS
Start: 2023-04-05

## 2023-04-05 RX ORDER — PHENYLEPHRINE HCL IN 0.9% NACL 0.4MG/10ML
SYRINGE (ML) INTRAVENOUS AS NEEDED
Status: DISCONTINUED
Start: 2023-04-05 | End: 2023-04-05 | Stop reason: HOSPADM

## 2023-04-05 RX ORDER — SUCCINYLCHOLINE CHLORIDE 20 MG/ML
INJECTION INTRAMUSCULAR; INTRAVENOUS AS NEEDED
Status: DISCONTINUED
Start: 2023-04-05 | End: 2023-04-05 | Stop reason: HOSPADM

## 2023-04-05 RX ORDER — LIDOCAINE HYDROCHLORIDE 20 MG/ML
INJECTION, SOLUTION EPIDURAL; INFILTRATION; INTRACAUDAL; PERINEURAL AS NEEDED
Status: DISCONTINUED
Start: 2023-04-05 | End: 2023-04-05 | Stop reason: HOSPADM

## 2023-04-05 RX ORDER — ROCURONIUM BROMIDE 10 MG/ML
INJECTION, SOLUTION INTRAVENOUS AS NEEDED
Status: DISCONTINUED
Start: 2023-04-05 | End: 2023-04-05 | Stop reason: HOSPADM

## 2023-04-05 RX ORDER — SODIUM CHLORIDE 0.9 % (FLUSH) 0.9 %
5-40 SYRINGE (ML) INJECTION AS NEEDED
Start: 2023-04-05

## 2023-04-05 RX ORDER — ONDANSETRON 2 MG/ML
4 INJECTION INTRAMUSCULAR; INTRAVENOUS AS NEEDED
Start: 2023-04-05

## 2023-04-05 RX ORDER — HYDROCODONE BITARTRATE AND ACETAMINOPHEN 5; 325 MG/1; MG/1
1 TABLET ORAL
Qty: 20 TABLET | Refills: 0 | Status: SHIPPED
Start: 2023-04-05 | End: 2023-04-10

## 2023-04-05 RX ORDER — SODIUM CHLORIDE, SODIUM LACTATE, POTASSIUM CHLORIDE, CALCIUM CHLORIDE 600; 310; 30; 20 MG/100ML; MG/100ML; MG/100ML; MG/100ML
25 INJECTION, SOLUTION INTRAVENOUS CONTINUOUS
Status: DISCONTINUED
Start: 2023-04-05 | End: 2023-04-05 | Stop reason: HOSPADM

## 2023-04-05 RX ORDER — IBUPROFEN 600 MG/1
600 TABLET ORAL
Qty: 20 TABLET | Refills: 0 | Status: SHIPPED
Start: 2023-04-05

## 2023-04-05 RX ORDER — SODIUM CHLORIDE 0.9 % (FLUSH) 0.9 %
5-40 SYRINGE (ML) INJECTION EVERY 8 HOURS
Status: DISCONTINUED
Start: 2023-04-05 | End: 2023-04-05 | Stop reason: HOSPADM

## 2023-04-05 RX ORDER — POLYETHYLENE GLYCOL 3350 17 G/17G
17 POWDER, FOR SOLUTION ORAL DAILY
Qty: 510 G | Refills: 0 | Status: SHIPPED
Start: 2023-04-05 | End: 2023-05-05

## 2023-04-05 RX ADMIN — DEXMEDETOMIDINE HYDROCHLORIDE 10 MCG: 100 INJECTION, SOLUTION, CONCENTRATE INTRAVENOUS at 08:50

## 2023-04-05 RX ADMIN — Medication 3 AMPULE: at 07:39

## 2023-04-05 RX ADMIN — SODIUM CHLORIDE, POTASSIUM CHLORIDE, SODIUM LACTATE AND CALCIUM CHLORIDE 25 ML/HR: 600; 310; 30; 20 INJECTION, SOLUTION INTRAVENOUS at 07:39

## 2023-04-05 RX ADMIN — FENTANYL CITRATE 100 MCG: 50 INJECTION, SOLUTION INTRAMUSCULAR; INTRAVENOUS at 08:44

## 2023-04-05 RX ADMIN — FENTANYL CITRATE 50 MCG: 50 INJECTION, SOLUTION INTRAMUSCULAR; INTRAVENOUS at 09:02

## 2023-04-05 RX ADMIN — DEXAMETHASONE SODIUM PHOSPHATE 8 MG: 4 INJECTION, SOLUTION INTRAMUSCULAR; INTRAVENOUS at 08:55

## 2023-04-05 RX ADMIN — SUCCINYLCHOLINE CHLORIDE 160 MG: 20 INJECTION, SOLUTION INTRAMUSCULAR; INTRAVENOUS at 08:44

## 2023-04-05 RX ADMIN — Medication 100 MCG: at 09:21

## 2023-04-05 RX ADMIN — FENTANYL CITRATE 50 MCG: 50 INJECTION, SOLUTION INTRAMUSCULAR; INTRAVENOUS at 09:10

## 2023-04-05 RX ADMIN — DEXMEDETOMIDINE HYDROCHLORIDE 10 MCG: 100 INJECTION, SOLUTION, CONCENTRATE INTRAVENOUS at 09:10

## 2023-04-05 RX ADMIN — PROPOFOL 150 MG: 10 INJECTION, EMULSION INTRAVENOUS at 08:44

## 2023-04-05 RX ADMIN — PROPOFOL 50 MG: 10 INJECTION, EMULSION INTRAVENOUS at 09:10

## 2023-04-05 RX ADMIN — ROCURONIUM BROMIDE 10 MG: 10 INJECTION INTRAVENOUS at 08:44

## 2023-04-05 RX ADMIN — WATER 2 G: 1 INJECTION INTRAMUSCULAR; INTRAVENOUS; SUBCUTANEOUS at 08:43

## 2023-04-05 RX ADMIN — LIDOCAINE HYDROCHLORIDE 100 MG: 20 INJECTION, SOLUTION EPIDURAL; INFILTRATION; INTRACAUDAL; PERINEURAL at 08:44

## 2023-04-05 RX ADMIN — Medication 100 MCG: at 09:17

## 2023-04-05 RX ADMIN — MIDAZOLAM HYDROCHLORIDE 2 MG: 1 INJECTION, SOLUTION INTRAMUSCULAR; INTRAVENOUS at 08:34

## 2023-04-05 RX ADMIN — ONDANSETRON HYDROCHLORIDE 4 MG: 2 INJECTION, SOLUTION INTRAMUSCULAR; INTRAVENOUS at 09:18

## 2023-04-05 NOTE — PERIOP NOTES
Handoff Report from Operating Room to PACU  [id number]  Report received from Juan Patel CRNA regarding Ila Canseco. Surgeon(s):  Tati Hawkins MD  And Procedure(s) (LRB):  EXCISIONAL BIOPSY OF BACK MASS (Right)  confirmed   with allergies and dressings discussed. Anesthesia type, drugs, patient history, complications, estimated blood loss, vital signs, intake and output, and last pain medication, lines, and temperature were reviewed. 12:71 PM  Discharge instructions provided; Reviewed DC instructions with patient. Opportunity for questions and clarifications was provided; verbalized understanding. Follow-up appointments reviewed/written for patient. Pt stable and ambulatory for discharge; spouse to provide transportation to home. Clarified all personal belongings sent with patient.     IV removed (without difficulty/pt tolerated well)

## 2023-04-05 NOTE — PROGRESS NOTES
To: Babatunde Bauman NP    From: Efrain Steve MD    Thank you for sending Mariely Whitfield to see us. Please note that this dictation was completed with [employer], the computer voice recognition software. Quite often unanticipated grammatical, syntax, homophones, and other interpretive errors are inadvertently transcribed by the software. Please disregard these errors. Please excuse any errors that have escaped final proofreading. Encounter Date: 3/20/2023  History and Physical    Assessment:   Soft tissue mass over the right scapula. History of breast cancer. Body mass index is 33.89 kg/m². Plan:   I recommended excisional biopsy. Risks including bleeding and infection were explained to the patient. The patient expressed understanding of the risks, and all questions were answered to the patient's satisfaction. HPI:   Sera Pereyra is a 62 y.o. female who is seen in consultation at the request of Babatunde Bauman NP for evaluation of a mass on the right upper back. It was first noticed about a month ago. It has not been inflamed. It has been tender at times. There has not been drainage.     Past Medical History:   Diagnosis Date    Atopic dermatitis     Breast cancer, right ([address] 07/2016    Menopause     Pleurisy 2006    Thyroid nodule     benign     Past Surgical History:   Procedure Laterality Date    HX BREAST BIOPSY Right 2016    HX BREAST RECONSTRUCTION Right 02/23/2017    RIGHT BREAST RECONSTRUCTION  /  TISSUE EXPANDER / ALLODERM  performed by Wolf Schaeffer MD at \[facility]\"" AMBULATORY OR    HX BREAST REDUCTION Left 2017    post mastectomy contrlateral breast    HX COLONOSCOPY      HX GYN  2002    conization    HX MASTECTOMY Right 02/23/2017    RIGHT BREAST TOTAL MASTECTOMY, AXILLARY LYMPH NODE DISSECTION, PORTACATH REMOVAL performed by Renaldo Ambrocio MD at [address]    HX VASCULAR ACCESS  2016    portacath insertion and removal    HX WISDOM TEETH EXTRACTION        Family History   Problem Relation Age of Onset    Colon Cancer Mother 39    Colon Cancer Paternal Uncle 79    Colon Cancer Paternal Uncle 79    Anesth Problems Neg Hx      Social History     Tobacco Use    Smoking status: Never     Passive exposure: Never    Smokeless tobacco: Never   Substance Use Topics    Alcohol use: No     Alcohol/week: 0.0 standard drinks      Current Outpatient Medications   Medication Sig    cholecalciferol (VITAMIN D3) 25 mcg (1,000 unit) cap Take  by mouth daily. HYDROcodone-acetaminophen (Norco) 5-325 mg per tablet Take 1 Tablet by mouth every four (4) hours as needed for Pain for up to 5 days. Max Daily Amount: 6 Tablets. ibuprofen (MOTRIN) 600 mg tablet Take 1 Tablet by mouth every six (6) hours as needed for Pain.    polyethylene glycol (MIRALAX) 17 gram/dose powder Take 17 g by mouth daily for 30 days. ondansetron (ZOFRAN ODT) 4 mg disintegrating tablet Take 1 Tablet by mouth every eight (8) hours as needed for Nausea or Vomiting. (Patient not taking: Reported on 3/6/2023)     No current facility-administered medications for this visit. Allergies:  No Known Allergies    Review of Systems:  10 systems reviewed. See scanned sheet in \"Media\" section. See HPI for pertinent positives and negatives. Objective:   Visit Vitals  /84 (BP 1 Location: Left upper arm, BP Patient Position: Sitting, BP Cuff Size: Adult)   Pulse 85   Temp 97.3 °F (36.3 °C) (Temporal)   Resp 20   Ht 5' 6\" (1.676 m)   Wt 95.3 kg (210 lb)   SpO2 97%   BMI 33.89 kg/m²       Physical Exam:  General appearance  Alert, cooperative, no distress, appears stated age   [de-identified] Anicteric           Lungs   Clear to auscultation bilaterally   Heart  Regular rate and rhythm. Extremities no cyanosis or edema   Pulses 2+ right radial       Lymph nodes No palpable axillary or supraclavicular LAD.    Neurologic Without overt sensory or motor deficit     Focused exam of the back reveals an approximately 5 cm subcutaneous partly mobile mass over the medial aspect of the right scapula.     Signed By: Ulysses Ríos MD     April 5, 2023

## 2023-04-05 NOTE — OP NOTES
[address]  OPERATIVE REPORT    Name:  Leonid Alonzo  MR#:  [id number]  :  1965  ACCOUNT #:  [de-identified]  DATE OF SERVICE:  2023    PREOPERATIVE DIAGNOSIS:  Subcutaneous right back mass. POSTOPERATIVE DIAGNOSIS:  Subcutaneous right back mass. PROCEDURE PERFORMED:  Excisional biopsy of subcutaneous right back mass. SURGEON:  Junior Loera MD    ASSISTANT:  Staff. ANESTHESIA:  General.    COMPLICATIONS:  None immediate. SPECIMENS REMOVED:  Right back mass. IMPLANTS:  None. ESTIMATED BLOOD LOSS:  Minimal.    DRAINS:  None. FINDINGS:  There was an approximately 6-cm subcutaneous lipomatous mass over the medial aspect of the right scapula. DESCRIPTION OF PROCEDURE:  After obtaining informed consent, the patient was taken to the operating room and placed supine on the operating table. An operative time-out was performed and general endotracheal anesthesia was induced. Preoperative antibiotics were administered and the patient was then rolled left side down lateral with appropriate padding and securing straps. The back was then prepped and draped in the usual sterile fashion. The mass was palpated and the skin over it was anesthetized with local anesthetic. The incision was made transversely with a blade and taken down through the subcutaneous tissues to the mass. This was dissected out using blunt dissection and electrocautery. The specimen was removed and passed off. The area was checked for any adjacent masses. None were palpated. The operative field was made hemostatic with electrocautery and then irrigation was used to check for bleeding. No bleeding was noted. Excellent hemostasis was noted. The deep dermal tissues were then reapproximated with buried interrupted 2-0 Vicryl sutures. The skin was closed with a running 4-0 Monocryl in the subcuticular layer. Dermabond was applied.   The patient was recovered from general anesthesia and taken to recovery area in satisfactory condition. All instrument, sponge, and needle counts were reported as correct.         Karen Foreman MD      [id number]  D:  04/05/2023 9:29  T:  04/05/2023 14:12  JOB #:  [id number]  CC:  NIKKI Ruiz MD

## 2023-04-05 NOTE — ANESTHESIA POSTPROCEDURE EVALUATION
Procedure(s):  EXCISIONAL BIOPSY OF BACK MASS. general    Anesthesia Post Evaluation      Multimodal analgesia: multimodal analgesia used between 6 hours prior to anesthesia start to PACU discharge  Patient location during evaluation: bedside  Patient participation: complete - patient participated  Level of consciousness: awake  Pain management: adequate  Airway patency: patent  Anesthetic complications: no  Cardiovascular status: acceptable  Respiratory status: acceptable  Hydration status: acceptable  Post anesthesia nausea and vomiting:  controlled  Final Post Anesthesia Temperature Assessment:  Normothermia (36.0-37.5 degrees C)      INITIAL Post-op Vital signs:   Vitals Value Taken Time   /89 04/05/23 1200   Temp 36.5 °C (97.7 °F) 04/05/23 1200   Pulse 74 04/05/23 1207   Resp 13 04/05/23 1207   SpO2 99 % 04/05/23 1207   Vitals shown include unvalidated device data.

## 2023-04-05 NOTE — DISCHARGE INSTRUCTIONS
Discharge Instructions:  Dr. Winslow Said    Call on next business day to arrange appointment for follow up in 3 week(s) -- [phone]. Activity:  Walk regularly starting immediately. No lifting more than 10 -15 pounds for 3 week(s). You may resume driving when off narcotics for pain. Work:  You may return to work in 1 week to light activity. No lifting more than 10 pounds (=\"light duty\") for 3 weeks. If your employer can not accommodate \"light duty,\" your employer will need to provide any necessary paperwork to our office. This document with serve as the initial \"note\" to your employer. Diet:  You may resume normal diet. Wound Care:  Dermabond glue dressing will fall off on its own. If you experience a lot of drainage, develop redness around the wound, or a fever over 101 F occurs please call the office. You may shower. No baths or swimming for 2 weeks. Medications:  Resume home medications as indicated on the Medical Reconciliation form. Do not use blood thinners (such as Aspirin, Coumadin, or Plavix) until 3 days after surgery. Pain medications:  Non steroidal antiinflammatories (ibuprofen -- Advil or Motrin) seem to work best for post surgical pain. Try these first.  A narcotic prescription will also be given for breakthrough pain. Do not use Tylenol while taking the narcotic because there is Tylenol in the narcotic pill, and too much Tylenol can injure your liver. Tylenol can be used in place of the narcotic, but do not take both at the same time. PUT ICE ON YOUR INCISION(S) 20 MINUTES EVERY HOUR WHILE AWAKE FOR THE NEXT 3 DAYS AT LEAST. PLACE A THIN CLOTH BETWEEN YOUR SKIN AND THE ICE BAG. Colace or Miralax should be used twice daily to prevent constipation while on narcotics. If you are still having trouble having a BM after 1-2 days, try milk of magnesia. If this does not work within 24 hours, try a bottle of magnesium citrate.      Narcotics and anesthesia sometimes cause nausea and vomiting. If persistent please call the office. Do not hesitate to call with questions or concerns. Shahida Amaral MD  Tel [phone]  Fax [phone]       DISCHARGE SUMMARY from Nurse    PATIENT INSTRUCTIONS:    After general anesthesia or intravenous sedation, for 24 hours or while taking prescription narcotics:    Have someone responsible help you with your care  Limit your activities  Do not drive and operate hazardous machinery  Do not make important personal, legal or business decisions  Do not drink alcoholic beverages  If you have not urinated within 8 hours after discharge, please contact your surgeon on call  Resume your medications unless otherwise instructed    From general anesthesia, intravenous sedation, or while taking prescription narcotics, you may experience:    Drowsiness, dizziness, sleepiness, or confusion  Difficulty remembering or delayed reaction times  Difficulty with your balance, especially while walking, move slowly and carefully, do not make sudden position changes  Difficulty focusing or blurred vision    You may not be aware of slight changes in your behavior and/or your reaction time because of the medication used during and after your procedure. Report the following to your surgeon:  Excessive pain, swelling, redness or odor of or around the surgical area  Temperature over 100.5  Nausea and vomiting lasting longer than 4 hours or if unable to take medications  Any signs of decreased circulation or nerve impairment to extremity: change in color, persistent numbness, tingling, coldness or increase pain  Any questions or concerns         IF YOU REPORT TO AN EMERGENCY ROOM, DOCTOR'S OFFICE OR HOSPITAL WITHIN 24 HOURS AFTER YOUR PROCEDURE, BRING THIS SHEET AND YOUR AFTER VISIT SUMMARY WITH YOU AND GIVE IT TO THE PHYSICIAN OR NURSE ATTENDING YOU. These are general instructions for a healthy lifestyle (if applicable):     No smoking/ No tobacco products/ Avoid exposure to secondhand smoke  Surgeon General's Warning:  Quitting smoking now greatly reduces serious risk to your health. Obesity, smoking, and sedentary lifestyle greatly increases your risk for illness    A healthy diet, regular physical exercise & weight monitoring are important for maintaining a healthy lifestyle    You may be retaining fluid if you have a history of heart failure or if you experience any of the following symptoms:  Weight gain of 3 pounds or more overnight or 5 pounds in a week, increased swelling in our hands or feet or shortness of breath while lying flat in bed. Please call your doctor as soon as you notice any of these symptoms; do not wait until your next office visit. A common side effect of anesthesia following surgery is nausea and/or vomiting. In order to decrease symptoms, it is wise to avoid foods that are high in fat, greasy foods, milk products, and spicy foods for the first 24 hours. Acceptable foods for the first 24 hours following surgery include but are not limited to:    soup  broth  toast   crackers   applesauce  bananas   mashed potatoes,  soft or scrambled eggs  oatmeal  jello    It is important to eat when taking your pain medication. This will help to prevent nausea. If possible, please try to time your meals with your medications. It is very important to stay hydrated following surgery. Sip fluids frequently while awake. Avoid acidic drinks such as citrus juices and soda for 24 hours. Carbonated beverages may cause bloating and gas. Acceptable fluids include:    water (flavor packets may add variety)  coffee or tea (in moderation)  Gatorade  Cem-Aid  apple juice  cranberry juice    You are encouraged to cough and deep breathe every hour when awake. This will help to prevent respiratory complications following anesthesia.  You may want to hug a pillow when coughing and sneezing to add additional support to the surgical area and to decrease discomfort if you had abdominal or chest surgery. If you are discharged home with support stockings, you may remove them after 24 hours. Support stockings are used to help prevent blood clots in the legs following surgery. TO PREVENT AN INFECTION      [address] YOUR HANDS    To prevent infection, good handwashing is the most important thing you or your caregiver can do. Wash your hands with soap and water or use the hand  we gave you before you touch any wounds. SHOWER    Use the antibacterial soap we gave you when you take a shower. Shower with this soap until your wounds are healed. To reach all areas of your body, you may need someone to help you. Dont forget to clean your belly button with every shower. USE CLEAN SHEETS    Use freshly cleaned sheets on your bed after surgery. To keep the surgery site clean, do not allow pets to sleep with you while your wound is still healing. STOP SMOKING    Stop smoking, or at least cut back on smoking    Smoking slows your healing. CONTROL YOUR BLOOD SUGAR    High blood sugars slow wound healing. If you are diabetic, control your blood sugar levels before and after your surgery. Please take time to review all of your Home Care Instructions and Medication Information sheets provided in your discharge packet. If you have any questions, please contact your surgeon's office. Thank you. The discharge information has been reviewed with the patient and instruction recipient. The patient and instruction recipient verbalized understanding. Discharge medications reviewed with the patient and instruction recipient and appropriate educational materials and side effects teaching were provided. Please provide this summary of care documentation to your next provider.

## 2023-04-05 NOTE — ANESTHESIA PREPROCEDURE EVALUATION
Relevant Problems   HEMATOLOGY   (+) Anemia   (+) Breast cancer metastasized to axillary lymph node (HCC)      PERSONAL HX & FAMILY HX OF CANCER   (+) Breast CA (HCC)   (+) Breast cancer metastasized to axillary lymph node (HCC)   (+) Breast cancer of upper-outer quadrant of right female breast (HCC)       Anesthetic History   No history of anesthetic complications            Review of Systems / Medical History  Patient summary reviewed and pertinent labs reviewed    Pulmonary                   Neuro/Psych   Within defined limits           Cardiovascular  Within defined limits                Exercise tolerance: >4 METS     GI/Hepatic/Renal                Endo/Other      Hypothyroidism  Cancer     Other Findings   Comments: Right breast cancer hx           Physical Exam    Airway  Mallampati: II  TM Distance: > 6 cm  Neck ROM: normal range of motion   Mouth opening: Normal     Cardiovascular  Regular rate and rhythm,  S1 and S2 normal,  no murmur, click, rub, or gallop  Rhythm: regular  Rate: normal         Dental  No notable dental hx       Pulmonary  Breath sounds clear to auscultation               Abdominal  GI exam deferred       Other Findings            Anesthetic Plan    ASA: 2  Anesthesia type: general    Monitoring Plan: BIS      Induction: Intravenous  Anesthetic plan and risks discussed with: Patient

## 2023-04-05 NOTE — BRIEF OP NOTE
[id number]  Brief Postoperative Note    Patient: Anoop Franco  YOB: 1965  MRN: [id number]    Date of Procedure: 4/5/2023     Pre-Op Diagnosis: BACK MASS    Post-Op Diagnosis: Same as preoperative diagnosis.       Procedure(s):  EXCISIONAL BIOPSY OF BACK MASS    Surgeon(s):  Brando Devine MD    Surgical Assistant: Surg Asst-1: Michelle CORADO    Anesthesia: General     Estimated Blood Loss (mL): Minimal    Complications: None imm    Specimens:   ID Type Source Tests Collected by Time Destination   1 : right back mass Preservative   Brando Devine MD 4/5/2023 4482 Pathology        Implants: * No implants in log *    Drains: * No LDAs found *    Findings: sc mass    Electronically Signed by Ahsan Everett MD on 4/5/2023 at 9:25 AM

## 2023-08-28 ENCOUNTER — TELEPHONE (OUTPATIENT)
Age: 58
End: 2023-08-28

## 2023-08-28 NOTE — TELEPHONE ENCOUNTER
Called patient left vm advising per provider she would like for patient to see pcp then follow up with us.

## 2023-08-28 NOTE — TELEPHONE ENCOUNTER
Call to patient, [phone], left VM on self identifying phone line with RN contact info. Update to Provider. ----- Message from Geovany Be sent at 8/28/2023 10:55 AM EDT -----  Patient called in stated she is experiencing pain and unsure if cancer is back, requesting to be see. Please advise?

## 2023-09-05 ENCOUNTER — OFFICE VISIT (OUTPATIENT)
Age: 58
End: 2023-09-05
Payer: COMMERCIAL

## 2023-09-05 VITALS — BODY MASS INDEX: 33.91 KG/M2 | HEIGHT: 66 IN | WEIGHT: 211 LBS

## 2023-09-05 DIAGNOSIS — Z17.1 MALIGNANT NEOPLASM OF UPPER-OUTER QUADRANT OF RIGHT BREAST IN FEMALE, ESTROGEN RECEPTOR NEGATIVE (HCC): Primary | ICD-10-CM

## 2023-09-05 DIAGNOSIS — N63.0 BREAST SWELLING: ICD-10-CM

## 2023-09-05 DIAGNOSIS — Z85.3 HISTORY OF BREAST CANCER IN FEMALE: ICD-10-CM

## 2023-09-05 DIAGNOSIS — Z90.11 ACQUIRED ABSENCE OF RIGHT BREAST AND NIPPLE: ICD-10-CM

## 2023-09-05 DIAGNOSIS — C50.411 MALIGNANT NEOPLASM OF UPPER-OUTER QUADRANT OF RIGHT BREAST IN FEMALE, ESTROGEN RECEPTOR NEGATIVE (HCC): Primary | ICD-10-CM

## 2023-09-05 PROCEDURE — 99213 OFFICE O/P EST LOW 20 MIN: CPT | Performed by: NURSE PRACTITIONER

## 2023-09-05 NOTE — PROGRESS NOTES
HISTORY OF PRESENT ILLNESS  Josephine Mtz is a 62 y.o. female     HPI  ESTABLISHED patient here for follow up for RIGHT breast IDC s/p RIGHT mastectomy in 2017. Reports pain x 2 week. Describes as throbbing and sharp shooting pain from RIGHT axilla into RIGHT reconstructed breast.        Breast history -    RIGHT breast IDC triple negative. Completed neoadjuvant chemotherapy. Followed by Dr. Jay Tsai. 17- RIGHT mastectomy, ALND, tissue expander placed by Dr. Cristino Brown - Dr. Alvina Ohara   1.2cm IDC, node negative - 0/15; T1cN0   3/30/17- evacuation of RIGHT seroma by Dr. Cristino Brown. Declined radiation. Seen by. Dr. Chalmers Claude.    17- SHAUNA flap with balancing LEFT breast reduction with Dr. Cristino Brown. Family history -   Mother who had colon cancer at age 39 and  at age 72  Two paternal uncles had colon cancer in their late 62s to early 76s. Past Surgical History:   Procedure Laterality Date    BREAST BIOPSY Right 2016    BREAST RECONSTRUCTION Right 2017    RIGHT BREAST RECONSTRUCTION  /  TISSUE EXPANDER / ALLODERM  performed by Yuliya Roman MD at [address] [place] [address] Left 2017    post mastectomy contrlateral breast    COLONOSCOPY      GYN  2002    conization    MASTECTOMY Right 2017    RIGHT BREAST TOTAL MASTECTOMY, AXILLARY LYMPH NODE DISSECTION, PORTACATH REMOVAL performed by Bridget Spain MD at [facility]  2016    portacath insertion and removal    WISDOM TOOTH EXTRACTION             Mammogram Result (most recent):  [facility]IN [facility] DIGITAL SCREEN UNI LEFT 2023    Narrative  This is a summary report. The complete report is available in the patient's medical record. If you cannot access the medical record, please contact the sending organization for a detailed fax or copy. STUDY: Left unilateral digital screening mammogram with 3-D tomosynthesis    INDICATION:  Screening.     COMPARISON: Prior studies

## 2023-09-15 ENCOUNTER — HOSPITAL ENCOUNTER (OUTPATIENT)
Age: 58
Discharge: HOME | End: 2023-09-15
Payer: COMMERCIAL

## 2023-09-15 ENCOUNTER — TELEPHONE (OUTPATIENT)
Age: 58
End: 2023-09-15

## 2023-09-15 DIAGNOSIS — Z85.3 HISTORY OF BREAST CANCER IN FEMALE: ICD-10-CM

## 2023-09-15 DIAGNOSIS — Z17.1 MALIGNANT NEOPLASM OF UPPER-OUTER QUADRANT OF RIGHT BREAST IN FEMALE, ESTROGEN RECEPTOR NEGATIVE (HCC): ICD-10-CM

## 2023-09-15 DIAGNOSIS — C50.411 MALIGNANT NEOPLASM OF UPPER-OUTER QUADRANT OF RIGHT BREAST IN FEMALE, ESTROGEN RECEPTOR NEGATIVE (HCC): ICD-10-CM

## 2023-09-15 DIAGNOSIS — N63.0 BREAST SWELLING: ICD-10-CM

## 2023-09-15 PROCEDURE — A9585 GADOBUTROL INJECTION: HCPCS | Performed by: STUDENT IN AN ORGANIZED HEALTH CARE EDUCATION/TRAINING PROGRAM

## 2023-09-15 PROCEDURE — 6360000004 HC RX CONTRAST MEDICATION: Performed by: STUDENT IN AN ORGANIZED HEALTH CARE EDUCATION/TRAINING PROGRAM

## 2023-09-15 PROCEDURE — C8908 MRI W/O FOL W/CONT, BREAST,: HCPCS

## 2023-09-15 RX ORDER — GADOBUTROL 604.72 MG/ML
10 INJECTION INTRAVENOUS
Status: COMPLETED | OUTPATIENT
Start: 2023-09-15 | End: 2023-09-15

## 2023-09-15 RX ADMIN — GADOBUTROL 10 ML: 604.72 INJECTION INTRAVENOUS at 08:29

## 2023-09-15 NOTE — TELEPHONE ENCOUNTER
Called patient. No answer. Mailbox full.   Will send [facility] message that breast MRI was normal.

## 2023-09-25 ENCOUNTER — OFFICE VISIT (OUTPATIENT)
Age: 58
End: 2023-09-25
Payer: COMMERCIAL

## 2023-09-25 VITALS
SYSTOLIC BLOOD PRESSURE: 121 MMHG | DIASTOLIC BLOOD PRESSURE: 81 MMHG | WEIGHT: 213.6 LBS | HEIGHT: 66 IN | BODY MASS INDEX: 34.33 KG/M2 | OXYGEN SATURATION: 95 % | TEMPERATURE: 98.4 F | HEART RATE: 80 BPM

## 2023-09-25 DIAGNOSIS — E89.0 H/O PARTIAL THYROIDECTOMY: ICD-10-CM

## 2023-09-25 DIAGNOSIS — Z13.6 SCREENING FOR CARDIOVASCULAR CONDITION: ICD-10-CM

## 2023-09-25 DIAGNOSIS — R53.83 FATIGUE, UNSPECIFIED TYPE: Primary | ICD-10-CM

## 2023-09-25 LAB
FERRITIN SERPL-MCNC: 72 NG/ML (ref 8–252)
FOLATE SERPL-MCNC: 10.5 NG/ML (ref 5–21)
TSH SERPL DL<=0.05 MIU/L-ACNC: 2.33 UIU/ML (ref 0.36–3.74)
VIT B12 SERPL-MCNC: 345 PG/ML (ref 193–986)

## 2023-09-25 PROCEDURE — 99213 OFFICE O/P EST LOW 20 MIN: CPT | Performed by: FAMILY MEDICINE

## 2023-09-25 SDOH — ECONOMIC STABILITY: FOOD INSECURITY: WITHIN THE PAST 12 MONTHS, YOU WORRIED THAT YOUR FOOD WOULD RUN OUT BEFORE YOU GOT MONEY TO BUY MORE.: NEVER TRUE

## 2023-09-25 SDOH — ECONOMIC STABILITY: HOUSING INSECURITY
IN THE LAST 12 MONTHS, WAS THERE A TIME WHEN YOU DID NOT HAVE A STEADY PLACE TO SLEEP OR SLEPT IN A SHELTER (INCLUDING NOW)?: NO

## 2023-09-25 SDOH — ECONOMIC STABILITY: FOOD INSECURITY: WITHIN THE PAST 12 MONTHS, THE FOOD YOU BOUGHT JUST DIDN'T LAST AND YOU DIDN'T HAVE MONEY TO GET MORE.: NEVER TRUE

## 2023-09-25 SDOH — ECONOMIC STABILITY: INCOME INSECURITY: HOW HARD IS IT FOR YOU TO PAY FOR THE VERY BASICS LIKE FOOD, HOUSING, MEDICAL CARE, AND HEATING?: NOT HARD AT ALL

## 2023-09-25 ASSESSMENT — ENCOUNTER SYMPTOMS
EYES NEGATIVE: 1
COLOR CHANGE: 0
DIARRHEA: 0
VOMITING: 0
SHORTNESS OF BREATH: 0
RESPIRATORY NEGATIVE: 1
COUGH: 0
NAUSEA: 0
CONSTIPATION: 0
GASTROINTESTINAL NEGATIVE: 1

## 2023-09-25 ASSESSMENT — PATIENT HEALTH QUESTIONNAIRE - PHQ9
SUM OF ALL RESPONSES TO PHQ9 QUESTIONS 1 & 2: 0
2. FEELING DOWN, DEPRESSED OR HOPELESS: 0
SUM OF ALL RESPONSES TO PHQ QUESTIONS 1-9: 0
1. LITTLE INTEREST OR PLEASURE IN DOING THINGS: 0
SUM OF ALL RESPONSES TO PHQ QUESTIONS 1-9: 0

## 2023-09-25 NOTE — PATIENT INSTRUCTIONS
Continue your routine care and supplements. Follow up in March for your yearly physical with pap smear.

## 2023-09-25 NOTE — PROGRESS NOTES
Lexie Garcia (:  1965) is a 62 y.o. female,Established patient, here for evaluation of the following chief complaint(s):  Establish Care (Pt denied any question or concern today.)        Subjective   SUBJECTIVE/OBJECTIVE:  Pt presents as an est pt new to provider to est care. Due for cervical cancer screening. 1. Hx of partial thyroidectomy for goiter. ROS: +hot flashes/heat intolerance exacerbated by spicy foods as well with fatigue. All others negative. Due for TSH today. Pt also complained of fatigue with hx of iron def anemia. Currently without pica (eating ice chips) and menopausal. Last CBC with nl Hgb.          Past Medical History:   Diagnosis Date    Atopic dermatitis     Breast cancer, right ([address]) 2016    Lipoma     on the back s/p excision    Menopause     Pleurisy 2006    Thyroid nodule     benign     Past Surgical History:   Procedure Laterality Date    BREAST BIOPSY Right 2016    BREAST RECONSTRUCTION Right 2017    RIGHT BREAST RECONSTRUCTION  /  TISSUE EXPANDER / ALLODERM  performed by Kat Dalal MD at [facility] Left 2017    post mastectomy contrlateral breast    COLONOSCOPY      GYN  2002    conization    MASTECTOMY Right 2017    RIGHT BREAST TOTAL MASTECTOMY, AXILLARY LYMPH NODE DISSECTION, PORTACATH REMOVAL performed by Kenny Mejia MD at [facility]    THYROIDECTOMY, PARTIAL Left     due to [address]  2016    portacath insertion and removal    WISDOM TOOTH EXTRACTION        Family History   Problem Relation Age of Onset    Anesth Problems Neg Hx     Colon Cancer Paternal Uncle 79    Colon Cancer Paternal Uncle 79    Colon Cancer Mother 39     Social History     Socioeconomic History    Marital status:      Spouse name: Not on file    Number of children: Not on file    Years of education: Not on file    Highest education level: Not on file   Occupational History

## 2023-09-26 DIAGNOSIS — D75.1 POLYCYTHEMIA: Primary | ICD-10-CM

## 2023-09-26 LAB
BASOPHILS # BLD: 0 K/UL (ref 0–0.1)
BASOPHILS NFR BLD: 1 % (ref 0–1)
CHOLEST SERPL-MCNC: 151 MG/DL
DIFFERENTIAL METHOD BLD: ABNORMAL
EOSINOPHIL # BLD: 0.1 K/UL (ref 0–0.4)
EOSINOPHIL NFR BLD: 2 % (ref 0–7)
ERYTHROCYTE [DISTWIDTH] IN BLOOD BY AUTOMATED COUNT: 12.5 % (ref 11.5–14.5)
HCT VFR BLD AUTO: 53.4 % (ref 35–47)
HDLC SERPL-MCNC: 51 MG/DL
HDLC SERPL: 3 (ref 0–5)
HGB BLD-MCNC: 17 G/DL (ref 11.5–16)
IMM GRANULOCYTES # BLD AUTO: 0 K/UL (ref 0–0.04)
IMM GRANULOCYTES NFR BLD AUTO: 0 % (ref 0–0.5)
IRON SATN MFR SERPL: 26 % (ref 20–50)
IRON SERPL-MCNC: 89 UG/DL (ref 35–150)
LDLC SERPL CALC-MCNC: 83.8 MG/DL (ref 0–100)
LYMPHOCYTES # BLD: 1.7 K/UL (ref 0.8–3.5)
LYMPHOCYTES NFR BLD: 40 % (ref 12–49)
MCH RBC QN AUTO: 28.3 PG (ref 26–34)
MCHC RBC AUTO-ENTMCNC: 31.8 G/DL (ref 30–36.5)
MCV RBC AUTO: 88.9 FL (ref 80–99)
MONOCYTES # BLD: 0.2 K/UL (ref 0–1)
MONOCYTES NFR BLD: 6 % (ref 5–13)
NEUTS SEG # BLD: 2.1 K/UL (ref 1.8–8)
NEUTS SEG NFR BLD: 51 % (ref 32–75)
NRBC # BLD: 0 K/UL (ref 0–0.01)
NRBC BLD-RTO: 0 PER 100 WBC
PLATELET # BLD AUTO: 256 K/UL (ref 150–400)
PMV BLD AUTO: 11.4 FL (ref 8.9–12.9)
RBC # BLD AUTO: 6.01 M/UL (ref 3.8–5.2)
TIBC SERPL-MCNC: 348 UG/DL (ref 250–450)
TRIGL SERPL-MCNC: 81 MG/DL
VLDLC SERPL CALC-MCNC: 16.2 MG/DL
WBC # BLD AUTO: 4.2 K/UL (ref 3.6–11)

## 2023-09-27 ENCOUNTER — TELEPHONE (OUTPATIENT)
Age: 58
End: 2023-09-27

## 2023-09-27 NOTE — TELEPHONE ENCOUNTER
Left a voicemail for patient to review her results through [employer] and to call us back if there's any questions.

## 2023-10-03 ENCOUNTER — PATIENT MESSAGE (OUTPATIENT)
Age: 58
End: 2023-10-03

## 2023-10-09 DIAGNOSIS — D75.1 POLYCYTHEMIA: ICD-10-CM

## 2023-10-10 LAB
ALBUMIN SERPL-MCNC: 3.9 G/DL (ref 3.5–5)
ALBUMIN/GLOB SERPL: 1.5 (ref 1.1–2.2)
ALP SERPL-CCNC: 78 U/L (ref 45–117)
ALT SERPL-CCNC: 23 U/L (ref 12–78)
ANION GAP SERPL CALC-SCNC: 4 MMOL/L (ref 5–15)
AST SERPL-CCNC: 13 U/L (ref 15–37)
BILIRUB SERPL-MCNC: 0.6 MG/DL (ref 0.2–1)
BUN SERPL-MCNC: 8 MG/DL (ref 6–20)
BUN/CREAT SERPL: 10 (ref 12–20)
CALCIUM SERPL-MCNC: 9.1 MG/DL (ref 8.5–10.1)
CHLORIDE SERPL-SCNC: 106 MMOL/L (ref 97–108)
CO2 SERPL-SCNC: 30 MMOL/L (ref 21–32)
CREAT SERPL-MCNC: 0.8 MG/DL (ref 0.55–1.02)
ERYTHROCYTE [DISTWIDTH] IN BLOOD BY AUTOMATED COUNT: 12.6 % (ref 11.5–14.5)
GLOBULIN SER CALC-MCNC: 2.6 G/DL (ref 2–4)
GLUCOSE SERPL-MCNC: 92 MG/DL (ref 65–100)
HCT VFR BLD AUTO: 50 % (ref 35–47)
HGB BLD-MCNC: 16 G/DL (ref 11.5–16)
MCH RBC QN AUTO: 28.2 PG (ref 26–34)
MCHC RBC AUTO-ENTMCNC: 32 G/DL (ref 30–36.5)
MCV RBC AUTO: 88 FL (ref 80–99)
NRBC # BLD: 0 K/UL (ref 0–0.01)
NRBC BLD-RTO: 0 PER 100 WBC
PERIPHERAL SMEAR, MD REVIEW: NORMAL
PLATELET # BLD AUTO: 207 K/UL (ref 150–400)
PMV BLD AUTO: 11.2 FL (ref 8.9–12.9)
POTASSIUM SERPL-SCNC: 4.1 MMOL/L (ref 3.5–5.1)
PROT SERPL-MCNC: 6.5 G/DL (ref 6.4–8.2)
RBC # BLD AUTO: 5.68 M/UL (ref 3.8–5.2)
SODIUM SERPL-SCNC: 140 MMOL/L (ref 136–145)
WBC # BLD AUTO: 3.7 K/UL (ref 3.6–11)

## 2023-10-11 LAB — EPO SERPL-ACNC: 10.1 MIU/ML (ref 2.6–18.5)

## 2023-10-11 NOTE — TELEPHONE ENCOUNTER
From: Natalya Babin  To: Dr. Graham Sit: 10/3/2023 11:23 AM EDT  Subject: Arvid Lies, can you please call me directly.  Does my recent bloodwork possibly indicate that cancer may be back?  [phone] work number

## 2023-10-20 LAB
CALR RESULT: NORMAL
DIRECTOR REVIEW: 489204: NORMAL
DIRECTOR REVIEW: NORMAL
JAK2 P.V617F BLD/T QL: NORMAL
JAK2 V617F RESULT: NORMAL
LABORATORY COMMENT REPORT: NORMAL
Lab: NORMAL
Lab: NORMAL
MPL RESULT: NORMAL
REFLEX: NORMAL
V617F REFLES CALR/MPL BACKGROUND: NORMAL

## 2024-01-29 ENCOUNTER — PATIENT MESSAGE (OUTPATIENT)
Age: 59
End: 2024-01-29

## 2024-01-30 NOTE — TELEPHONE ENCOUNTER
From: Sue Scott  To: Dr. Michelle Marrero  Sent: 1/29/2024 9:28 AM EST  Subject: Denistry    I need referral to a dentist for my daughter. Please advise.    [facility]  [phone]

## 2024-03-21 LAB
CALR %: NORMAL %
CALR AMINO ACID: NORMAL
CALR NUCLEOTIDE: NORMAL
CALR RESULT: NORMAL
MPL RESULT: NORMAL

## 2024-06-13 LAB
DIRECTOR REVIEW: 489204: NORMAL
DIRECTOR REVIEW: NORMAL
JAK2 P.V617F BLD/T QL: NORMAL
JAK2 V617F RESULT: NORMAL
LABORATORY COMMENT REPORT: NORMAL
Lab: NORMAL
Lab: NORMAL
REFLEX: NORMAL
V617F REFLES CALR/MPL BACKGROUND: NORMAL

## 2024-06-14 ENCOUNTER — OFFICE VISIT (OUTPATIENT)
Age: 59
End: 2024-06-14
Payer: COMMERCIAL

## 2024-06-14 VITALS
SYSTOLIC BLOOD PRESSURE: 138 MMHG | TEMPERATURE: 98.5 F | WEIGHT: 218 LBS | RESPIRATION RATE: 18 BRPM | HEART RATE: 93 BPM | HEIGHT: 66 IN | BODY MASS INDEX: 35.03 KG/M2 | DIASTOLIC BLOOD PRESSURE: 85 MMHG | OXYGEN SATURATION: 97 %

## 2024-06-14 DIAGNOSIS — C77.3 CARCINOMA OF LEFT BREAST METASTATIC TO AXILLARY LYMPH NODE (HCC): ICD-10-CM

## 2024-06-14 DIAGNOSIS — Z11.3 SCREENING EXAMINATION FOR STI: ICD-10-CM

## 2024-06-14 DIAGNOSIS — C50.912 CARCINOMA OF LEFT BREAST METASTATIC TO AXILLARY LYMPH NODE (HCC): ICD-10-CM

## 2024-06-14 DIAGNOSIS — N30.01 ACUTE CYSTITIS WITH HEMATURIA: Primary | ICD-10-CM

## 2024-06-14 DIAGNOSIS — R10.9 ABDOMINAL PAIN, UNSPECIFIED ABDOMINAL LOCATION: ICD-10-CM

## 2024-06-14 LAB
BILIRUBIN, URINE, POC: NEGATIVE
BLOOD URINE, POC: NORMAL
GLUCOSE URINE, POC: NEGATIVE
KETONES, URINE, POC: NEGATIVE
LEUKOCYTE ESTERASE, URINE, POC: NORMAL
NITRITE, URINE, POC: NEGATIVE
PH, URINE, POC: 6 (ref 4.6–8)
PROTEIN,URINE, POC: 100
SPECIFIC GRAVITY, URINE, POC: 1.03 (ref 1–1.03)
URINALYSIS CLARITY, POC: CLEAR
URINALYSIS COLOR, POC: YELLOW
UROBILINOGEN, POC: NORMAL

## 2024-06-14 PROCEDURE — 81001 URINALYSIS AUTO W/SCOPE: CPT | Performed by: FAMILY MEDICINE

## 2024-06-14 PROCEDURE — 99214 OFFICE O/P EST MOD 30 MIN: CPT | Performed by: FAMILY MEDICINE

## 2024-06-14 RX ORDER — NITROFURANTOIN 25; 75 MG/1; MG/1
100 CAPSULE ORAL 2 TIMES DAILY
Qty: 10 CAPSULE | Refills: 0 | Status: SHIPPED | OUTPATIENT
Start: 2024-06-14 | End: 2024-06-17

## 2024-06-14 SDOH — ECONOMIC STABILITY: FOOD INSECURITY: WITHIN THE PAST 12 MONTHS, THE FOOD YOU BOUGHT JUST DIDN'T LAST AND YOU DIDN'T HAVE MONEY TO GET MORE.: NEVER TRUE

## 2024-06-14 SDOH — ECONOMIC STABILITY: FOOD INSECURITY: WITHIN THE PAST 12 MONTHS, YOU WORRIED THAT YOUR FOOD WOULD RUN OUT BEFORE YOU GOT MONEY TO BUY MORE.: NEVER TRUE

## 2024-06-14 SDOH — ECONOMIC STABILITY: INCOME INSECURITY: HOW HARD IS IT FOR YOU TO PAY FOR THE VERY BASICS LIKE FOOD, HOUSING, MEDICAL CARE, AND HEATING?: NOT HARD AT ALL

## 2024-06-14 ASSESSMENT — PATIENT HEALTH QUESTIONNAIRE - PHQ9
SUM OF ALL RESPONSES TO PHQ QUESTIONS 1-9: 0
1. LITTLE INTEREST OR PLEASURE IN DOING THINGS: NOT AT ALL
SUM OF ALL RESPONSES TO PHQ QUESTIONS 1-9: 0
SUM OF ALL RESPONSES TO PHQ9 QUESTIONS 1 & 2: 0
2. FEELING DOWN, DEPRESSED OR HOPELESS: NOT AT ALL

## 2024-06-14 NOTE — PROGRESS NOTES
RM: 01  Chief Complaint   Patient presents with    Urinary Frequency      Vitals:    06/14/24 1602   BP: 138/85   Pulse: 93   Resp: 18   Temp: 98.5 °F (36.9 °C)   SpO2: 97%      FASTING: No  Have you been to the ER, urgent care clinic since your last visit?  Hospitalized since your last visit?\"    NO  “Have you seen or consulted any other health care providers outside of [facility] since your last visit?”    NO    Click Here for Release of Records Request   
  Pulmonary:      Effort: Pulmonary effort is normal. No respiratory distress.      Breath sounds: Normal breath sounds. No wheezing, rhonchi or rales.   Abdominal:      General: Abdomen is flat. Bowel sounds are normal.      Palpations: Abdomen is soft.      Tenderness: There is no abdominal tenderness. There is no right CVA tenderness, left CVA tenderness, guarding or rebound.   Neurological:      Mental Status: She is alert.       Results for orders placed or performed in visit on 06/14/24   Culture, Urine    Specimen: Urine   Result Value Ref Range    Special Requests NO SPECIAL REQUESTS      [place] count >100,000  COLONIES/mL        Culture Gram negative rods (A)     AMB POC URINALYSIS DIP STICK AUTO W/ MICRO   Result Value Ref Range    Color (UA POC) Yellow     Clarity (UA POC) Clear     Glucose, Urine, POC Negative     Bilirubin, Urine, POC Negative     Ketones, Urine, POC Negative     Specific Gravity, Urine, POC 1.030 1.001 - 1.035    Blood (UA POC) Trace     pH, Urine, POC 6.0 4.6 - 8.0    Protein, Urine,      Urobilinogen, POC 2 mg/dL     Nitrite, Urine, POC Negative     Leukocyte Esterase, Urine, POC Small             An electronic signature was used to authenticate this note.    --Tanisha Marrero MD

## 2024-06-14 NOTE — ASSESSMENT & PLAN NOTE
Monitored by specialist- no acute findings meriting change in the plan s/p chemotherapy, SHAUNA flap with balancing left breast reduction. S/p mastectomy, ALND, and tissue expander.

## 2024-06-17 DIAGNOSIS — N30.01 ACUTE CYSTITIS WITH HEMATURIA: Primary | ICD-10-CM

## 2024-06-17 LAB
BACTERIA SPEC CULT: ABNORMAL
CC UR VC: ABNORMAL
SERVICE CMNT-IMP: ABNORMAL

## 2024-06-17 RX ORDER — CIPROFLOXACIN 250 MG/1
250 TABLET, FILM COATED ORAL 2 TIMES DAILY
Qty: 6 TABLET | Refills: 0 | Status: SHIPPED | OUTPATIENT
Start: 2024-06-17 | End: 2024-06-20

## 2024-06-18 ENCOUNTER — TELEPHONE (OUTPATIENT)
Age: 59
End: 2024-06-18

## 2024-06-18 NOTE — TELEPHONE ENCOUNTER
----- Message from Michelle Marrero MD sent at 6/17/2024 11:25 PM EDT -----  Call:  your urine culture is sensitive to the antibiotic.  However, it is is not the most effective for this bacteria.  Another antibiotic Is being sent to your pharmacy.  Take as prescribed and return if you do not improve as expected.  Have a great weekend. Hope you are starting to feel better.

## 2024-06-18 NOTE — TELEPHONE ENCOUNTER
I have attempted without success to contact this patient by phone to discuss lab results.Urine culture was positive for an infection   sent a prescription over to the pharmacy on file for the patient to assist with the infection. A Feedback message was sent to the patient as well.

## 2024-06-18 NOTE — RESULT ENCOUNTER NOTE
Call:  your urine culture is sensitive to the antibiotic.  However, it is is not the most effective for this bacteria.  Another antibiotic Is being sent to your pharmacy.  Take as prescribed and return if you do not improve as expected.  Have a great weekend. Hope you are starting to feel better.

## 2024-06-23 ENCOUNTER — PATIENT MESSAGE (OUTPATIENT)
Age: 59
End: 2024-06-23

## 2024-06-24 NOTE — TELEPHONE ENCOUNTER
From: Sue Scott  To: Dr. Fernando Bar  Sent: 6/23/2024 2:22 PM EDT  Subject: Fatty Tumor    I have small fatty spot in the middle of my right knee. I was told not to worry about it unless it is painful, changes color or texture. Please advise?    Sue Scott  [phone]

## 2024-06-25 NOTE — TELEPHONE ENCOUNTER
From: Sue Scott  To: Dr. Michelle Marrero  Sent: 6/23/2024 2:20 PM EDT  Subject: Test Results    What does high RBC mean and what do I do to correct it?  What does high Hematocrit mean and how do I correct it?

## 2025-06-13 ENCOUNTER — TRANSCRIBE ORDERS (OUTPATIENT)
Facility: HOSPITAL | Age: 60
End: 2025-06-13

## 2025-06-13 ENCOUNTER — OFFICE VISIT (OUTPATIENT)
Age: 60
End: 2025-06-13

## 2025-06-13 VITALS
HEIGHT: 66 IN | WEIGHT: 218.8 LBS | SYSTOLIC BLOOD PRESSURE: 133 MMHG | DIASTOLIC BLOOD PRESSURE: 84 MMHG | BODY MASS INDEX: 35.17 KG/M2 | OXYGEN SATURATION: 98 % | HEART RATE: 99 BPM | TEMPERATURE: 97.4 F | RESPIRATION RATE: 16 BRPM

## 2025-06-13 DIAGNOSIS — Z01.419 WELL WOMAN EXAM WITH ROUTINE GYNECOLOGICAL EXAM: Primary | ICD-10-CM

## 2025-06-13 DIAGNOSIS — Z12.4 CERVICAL CANCER SCREENING: ICD-10-CM

## 2025-06-13 DIAGNOSIS — Z12.31 VISIT FOR SCREENING MAMMOGRAM: Primary | ICD-10-CM

## 2025-06-13 DIAGNOSIS — C50.912 CARCINOMA OF LEFT BREAST METASTATIC TO AXILLARY LYMPH NODE (HCC): ICD-10-CM

## 2025-06-13 DIAGNOSIS — N64.4 MASTODYNIA OF RIGHT BREAST: ICD-10-CM

## 2025-06-13 DIAGNOSIS — E66.09 CLASS 2 OBESITY DUE TO EXCESS CALORIES WITHOUT SERIOUS COMORBIDITY WITH BODY MASS INDEX (BMI) OF 35.0 TO 35.9 IN ADULT: ICD-10-CM

## 2025-06-13 DIAGNOSIS — Z12.11 SCREENING FOR COLON CANCER: ICD-10-CM

## 2025-06-13 DIAGNOSIS — E66.812 CLASS 2 OBESITY DUE TO EXCESS CALORIES WITHOUT SERIOUS COMORBIDITY WITH BODY MASS INDEX (BMI) OF 35.0 TO 35.9 IN ADULT: ICD-10-CM

## 2025-06-13 DIAGNOSIS — C77.3 CARCINOMA OF LEFT BREAST METASTATIC TO AXILLARY LYMPH NODE (HCC): ICD-10-CM

## 2025-06-13 SDOH — ECONOMIC STABILITY: FOOD INSECURITY: WITHIN THE PAST 12 MONTHS, YOU WORRIED THAT YOUR FOOD WOULD RUN OUT BEFORE YOU GOT MONEY TO BUY MORE.: NEVER TRUE

## 2025-06-13 SDOH — ECONOMIC STABILITY: FOOD INSECURITY: WITHIN THE PAST 12 MONTHS, THE FOOD YOU BOUGHT JUST DIDN'T LAST AND YOU DIDN'T HAVE MONEY TO GET MORE.: NEVER TRUE

## 2025-06-13 ASSESSMENT — PATIENT HEALTH QUESTIONNAIRE - PHQ9
SUM OF ALL RESPONSES TO PHQ QUESTIONS 1-9: 0
1. LITTLE INTEREST OR PLEASURE IN DOING THINGS: NOT AT ALL
SUM OF ALL RESPONSES TO PHQ QUESTIONS 1-9: 0
SUM OF ALL RESPONSES TO PHQ QUESTIONS 1-9: 0
2. FEELING DOWN, DEPRESSED OR HOPELESS: NOT AT ALL
SUM OF ALL RESPONSES TO PHQ QUESTIONS 1-9: 0

## 2025-06-13 NOTE — PROGRESS NOTES
RM:5    Chief Complaint   Patient presents with    Annual Exam     Physical   - pt has concerns that her cancer is back, the same pain and dull ache is how she discovered her cancer last time.        Vitals:    06/13/25 1626   BP: 133/84   BP Site: Left Upper Arm   Patient Position: Sitting   BP Cuff Size: Large Adult   Pulse: 99   Resp: 16   Temp: 97.4 °F (36.3 °C)   TempSrc: Oral   SpO2: 98%   Weight: 99.2 kg (218 lb 12.8 oz)   Height: 1.676 m (5' 6\")        FASTING: No    \"Have you been to the ER, urgent care clinic since your last visit?  Hospitalized since your last visit?\"    NO    “Have you seen or consulted any other health care providers outside of [facility] since your last visit?”    NO            Click Here for Release of Records Request   
  Musculoskeletal:         General: Normal range of motion.      Cervical back: Normal range of motion and neck supple.      Right lower leg: No edema.      Left lower leg: No edema.   Lymphadenopathy:      Cervical: No cervical adenopathy.      Upper Body:      Right upper body: No axillary adenopathy.      Left upper body: No axillary adenopathy.      Lower Body: No right inguinal adenopathy. No left inguinal adenopathy.   Skin:     General: Skin is warm and dry.   Neurological:      General: No focal deficit present.      Mental Status: She is alert. Mental status is at baseline.      Cranial Nerves: No cranial nerve deficit.      Sensory: No sensory deficit.      Motor: No weakness.   Psychiatric:         Mood and Affect: Mood normal.         Behavior: Behavior normal.         Thought Content: Thought content normal.         Judgment: Judgment normal.              6/13/2025     4:28 PM   PHQ-9    Little interest or pleasure in doing things 0   Feeling down, depressed, or hopeless 0   PHQ-2 Score 0   PHQ-9 Total Score 0       An electronic signature was used to authenticate this note.    --Tanisha Marrero MD

## 2025-06-16 ENCOUNTER — HOSPITAL ENCOUNTER (OUTPATIENT)
Facility: HOSPITAL | Age: 60
Setting detail: SPECIMEN
Discharge: HOME | End: 2025-06-19
Payer: COMMERCIAL

## 2025-06-16 ENCOUNTER — PATIENT MESSAGE (OUTPATIENT)
Age: 60
End: 2025-06-16

## 2025-06-16 DIAGNOSIS — Z12.4 CERVICAL CANCER SCREENING: ICD-10-CM

## 2025-06-16 DIAGNOSIS — C50.912 CARCINOMA OF LEFT BREAST METASTATIC TO AXILLARY LYMPH NODE (HCC): ICD-10-CM

## 2025-06-16 DIAGNOSIS — Z01.419 WELL WOMAN EXAM WITH ROUTINE GYNECOLOGICAL EXAM: ICD-10-CM

## 2025-06-16 DIAGNOSIS — C77.3 CARCINOMA OF LEFT BREAST METASTATIC TO AXILLARY LYMPH NODE (HCC): ICD-10-CM

## 2025-06-16 DIAGNOSIS — N64.4 MASTODYNIA: Primary | ICD-10-CM

## 2025-06-16 PROCEDURE — 88142 CYTOPATH C/V THIN LAYER: CPT

## 2025-06-16 PROCEDURE — 87624 HPV HI-RISK TYP POOLED RSLT: CPT

## 2025-06-18 LAB — HPV I/H RISK 1 DNA CVX QL PROBE+SIG AMP: NEGATIVE

## 2025-06-19 ENCOUNTER — RESULTS FOLLOW-UP (OUTPATIENT)
Age: 60
End: 2025-06-19

## 2025-06-19 NOTE — RESULT ENCOUNTER NOTE
Letter:  your pap smear is normal and negative for HPV.  Your pap smear can be repeated in 5 yrs. Continue your yearly annual exams.  Have a great rest of the year.

## 2025-06-20 ENCOUNTER — RESULTS FOLLOW-UP (OUTPATIENT)
Age: 60
End: 2025-06-20

## 2025-06-20 ENCOUNTER — HOSPITAL ENCOUNTER (OUTPATIENT)
Age: 60
Discharge: HOME | End: 2025-06-23
Payer: COMMERCIAL

## 2025-06-20 DIAGNOSIS — Z12.31 VISIT FOR SCREENING MAMMOGRAM: ICD-10-CM

## 2025-06-20 PROCEDURE — 77063 BREAST TOMOSYNTHESIS BI: CPT

## 2025-06-21 ASSESSMENT — ENCOUNTER SYMPTOMS
RESPIRATORY NEGATIVE: 1
GASTROINTESTINAL NEGATIVE: 1
ALLERGIC/IMMUNOLOGIC NEGATIVE: 1
EYES NEGATIVE: 1

## 2025-06-26 ENCOUNTER — RESULTS FOLLOW-UP (OUTPATIENT)
Age: 60
End: 2025-06-26

## 2025-06-26 ENCOUNTER — HOSPITAL ENCOUNTER (OUTPATIENT)
Facility: HOSPITAL | Age: 60
Discharge: HOME | End: 2025-06-29
Attending: FAMILY MEDICINE
Payer: COMMERCIAL

## 2025-06-26 DIAGNOSIS — C77.3 CARCINOMA OF LEFT BREAST METASTATIC TO AXILLARY LYMPH NODE (HCC): ICD-10-CM

## 2025-06-26 DIAGNOSIS — C50.912 CARCINOMA OF LEFT BREAST METASTATIC TO AXILLARY LYMPH NODE (HCC): ICD-10-CM

## 2025-06-26 DIAGNOSIS — N64.4 MASTODYNIA OF RIGHT BREAST: ICD-10-CM

## 2025-06-26 PROCEDURE — 6360000004 HC RX CONTRAST MEDICATION: Performed by: FAMILY MEDICINE

## 2025-06-26 PROCEDURE — A9585 GADOBUTROL INJECTION: HCPCS | Performed by: FAMILY MEDICINE

## 2025-06-26 PROCEDURE — C8908 MRI W/O FOL W/CONT, BREAST,: HCPCS

## 2025-06-26 RX ORDER — GADOBUTROL 604.72 MG/ML
10 INJECTION INTRAVENOUS
Status: COMPLETED | OUTPATIENT
Start: 2025-06-26 | End: 2025-06-26

## 2025-06-26 RX ADMIN — GADOBUTROL 10 ML: 604.72 INJECTION INTRAVENOUS at 10:47

## 2025-06-26 NOTE — RESULT ENCOUNTER NOTE
Letter:  your MRI is negative.  Continue to monitor for any acute changes. Have this repeated yearly or sooner if needed. Have a great start to the summer.

## 2025-07-02 ENCOUNTER — CLINICAL DOCUMENTATION (OUTPATIENT)
Age: 60
End: 2025-07-02

## 2025-07-02 NOTE — PROGRESS NOTES
Received a referral from Michelle Marrero for Mastodynia. Called but was unable to leave a message as the mailbox was full.

## (undated) DEVICE — SUTURE VCRL SZ 2-0 L27IN ABSRB UD L26MM SH 1/2 CIR J417H

## (undated) DEVICE — HANDLE LT SNAP ON ULT DURABLE LENS FOR TRUMPF ALC DISPOSABLE

## (undated) DEVICE — ENDOTRACH TUBE 8229507 CONT EMG 7MM ROHS: Brand: NIM CONTACT®

## (undated) DEVICE — SUTURE MCRYL SZ 3-0 L27IN ABSRB UD L26MM SH 1/2 CIR Y416H

## (undated) DEVICE — TIP SUCT BLU PLAS BLB W/O CTRL VENT YANK

## (undated) DEVICE — GARMENT,MEDLINE,DVT,INT,CALF,MED, GEN2: Brand: MEDLINE

## (undated) DEVICE — SUTURE VCRL SZ 3-0 L27IN ABSRB UD L26MM SH 1/2 CIR J416H

## (undated) DEVICE — Device

## (undated) DEVICE — DRAPE PRB US TRNSDCR 6X96IN --

## (undated) DEVICE — SUTURE PROL 2-0 L48IN NONABSORBABLE BLU SH L26MM 1/2 CIR 8533H

## (undated) DEVICE — PREP SKN CHLRAPRP APL 26ML STR --

## (undated) DEVICE — SUTURE PERMAHAND SZ 2-0 L18IN NONABSORBABLE BLK L26MM PS 1588H

## (undated) DEVICE — INTENDED FOR TISSUE SEPARATION, AND OTHER PROCEDURES THAT REQUIRE A SHARP SURGICAL BLADE TO PUNCTURE OR CUT.: Brand: BARD-PARKER ® CARBON RIB-BACK BLADES

## (undated) DEVICE — ASTOUND STANDARD SURGICAL GOWN, XL: Brand: CONVERTORS

## (undated) DEVICE — TOWEL SURG W17XL27IN STD BLU COT NONFENESTRATED PREWASHED

## (undated) DEVICE — CLIP HEMSTAT TI CHEVRON SHP INTLOK ATRAUM TEETH MICROCLP

## (undated) DEVICE — ARGYLE FRAZIER SURGICAL SUCTION INSTRUMENT 10 FR/CH (3.3 MM): Brand: ARGYLE

## (undated) DEVICE — BASIN ST MAJOR-NO CAUTERY: Brand: MEDLINE INDUSTRIES, INC.

## (undated) DEVICE — NEEDLE HYPO 20GA L1IN YEL POLYPR HUB S STL REG BVL STR W/O

## (undated) DEVICE — DRAIN SURG 19FR SIL RND HUBLESS W/ 0.25IN BEND TRCR BLAK

## (undated) DEVICE — SYRINGE MED 20ML STD CLR PLAS LUERLOCK TIP N CTRL DISP

## (undated) DEVICE — SUTURE PDS II SZ 1 L96IN ABSRB VLT TP-1 L65MM 1/2 CIR Z880G

## (undated) DEVICE — SUTURE MCRYL SZ 4-0 L27IN ABSRB UD SH L26MM 1/2 CIR Y415H

## (undated) DEVICE — KENDALL SCD EXPRESS SLEEVES, KNEE LENGTH, MEDIUM: Brand: KENDALL SCD

## (undated) DEVICE — Z DISCONTINUED USE 2717541 SUTURE STRATAFIX SZ 3-0 L30CM NONABSORBABLE UD L26MM FS 3/8

## (undated) DEVICE — 3M™ TEGADERM™ TRANSPARENT FILM DRESSING FRAME STYLE, 1624W, 2-3/8 IN X 2-3/4 IN (6 CM X 7 CM), 100/CT 4CT/CASE: Brand: 3M™ TEGADERM™

## (undated) DEVICE — TRAY CATH OD16FR SIL URIN M STATLOK STBL DEV SURSTP

## (undated) DEVICE — SUTURE MCRYL SZ 4-0 L27IN ABSRB UD L19MM PS-2 1/2 CIR PRIM Y426H

## (undated) DEVICE — PENCIL ES L10FT COAT BLDE HOLSTER

## (undated) DEVICE — SUT VCRL 3-0 27IN SH UD --

## (undated) DEVICE — SOLUTION LACTATED RINGERS INJECTION USP

## (undated) DEVICE — ADHESIVE SKIN CLSR 0.7ML TOP DERMBND ADV

## (undated) DEVICE — DERMABOND SKIN ADH 0.7ML -- DERMABOND ADVANCED 12/BX

## (undated) DEVICE — WIPE 400300 MEROCEL 20PK INSTRUMENT: Brand: MEROCEL®

## (undated) DEVICE — STAPLER SKIN SQ 30 ABSRB STPL DISP INSORB

## (undated) DEVICE — BIPOLAR FORCEPS CORD: Brand: VALLEYLAB

## (undated) DEVICE — COVER LT HNDL BLU PLAS

## (undated) DEVICE — SURGICAL PROCEDURE PACK BASIN MAJ SET CUST NO CAUT

## (undated) DEVICE — MAGNETIC INSTR DRAPE 20X16: Brand: MEDLINE INDUSTRIES, INC.

## (undated) DEVICE — DEVON™ KNEE AND BODY STRAP 60" X 3" (1.5 M X 7.6 CM): Brand: DEVON

## (undated) DEVICE — GOWN,SIRUS,FABRNF,2XL,18/CS: Brand: MEDLINE

## (undated) DEVICE — (D)SYR 10ML 1/5ML GRAD NSAF -- PKGING CHANGE USE ITEM 338027

## (undated) DEVICE — SET 2ND L34IN N DEHP THE QUEENS MED CNTR VALUELINK

## (undated) DEVICE — SLIM BODY SKIN STAPLER: Brand: APPOSE ULC

## (undated) DEVICE — 40418 TRENDELENBURG ONE-STEP ARM PROTECTORS LARGE (1 PAIR): Brand: 40418 TRENDELENBURG ONE-STEP ARM PROTECTORS LARGE (1 PAIR)

## (undated) DEVICE — STAPLER SKIN H3.9MM WIRE DIA0.58MM CRWN 6.9MM 35 STPL ROT

## (undated) DEVICE — SUTURE MCRYL 2-0 L36IN ABSRB VLT CT-1 L36MM 1/2 CIR Y345H

## (undated) DEVICE — (D)PREP SKN CHLRAPRP APPL 26ML -- CONVERT TO ITEM 371833

## (undated) DEVICE — 2DSM24 2-0 UND MONODERM 40X40: Brand: 2DSM24 2-0 UND MONODERM 40X40

## (undated) DEVICE — DRAPE,CHEST,FENES,15X10,STERIL: Brand: MEDLINE

## (undated) DEVICE — ELECTRODE PT RET AD L9FT HI MOIST COND ADH HYDRGEL CORDED

## (undated) DEVICE — 2DSM24 2-0 UND MONODERM 30X30: Brand: 2DSM24 2-0 UND MONODERM 30X30

## (undated) DEVICE — CLIP SKIN CLSR MIC TI SUPERFINE

## (undated) DEVICE — CHEST/BREAST-LF: Brand: MEDLINE INDUSTRIES, INC.

## (undated) DEVICE — STERILE NEOPRENE POWDER-FREE SURGICAL GLOVES WITH NITRILE COATING: Brand: PROTEXIS

## (undated) DEVICE — INSULATED BLADE ELECTRODE: Brand: EDGE

## (undated) DEVICE — SUT SLK 3-0 30IN SH BLK --

## (undated) DEVICE — SOLUTION IV 1000ML 0.9% SOD CHL

## (undated) DEVICE — SUTURE VCRL SZ 3-0 L18IN ABSRB UD L26MM SH 1/2 CIR J864D

## (undated) DEVICE — MICROVASCULAR CLAMPS ARE USED FOR END-TO-END ANASTOMOTIC PROCEDURES FOR ARTERIES AND VEINS: Brand: GEM BIOVER MICROVASCULAR CLAMP

## (undated) DEVICE — GLOVE SURG SZ 85 CRM LTX FREE POLYISOPRENE POLYMER BEAD ANTI

## (undated) DEVICE — GOWN,SIRUS,NONRNF,SETINSLV,2XL,18/CS: Brand: MEDLINE

## (undated) DEVICE — ENT-SMH: Brand: MEDLINE INDUSTRIES, INC.

## (undated) DEVICE — KERLIX BANDAGE ROLL: Brand: KERLIX

## (undated) DEVICE — INFECTION CONTROL KIT SYS

## (undated) DEVICE — REM POLYHESIVE ADULT PATIENT RETURN ELECTRODE: Brand: VALLEYLAB

## (undated) DEVICE — SYR 50ML LR LCK 1ML GRAD NSAF --

## (undated) DEVICE — CATHETER IV 18GA L1.25IN FEP STR HUB INTROCAN SFTY

## (undated) DEVICE — MATERIAL BKGRND W25XL50MM 1MM GRID LN BLU SIL RADPQ MERCIAN

## (undated) DEVICE — SYR 10ML CTRL LR LCK NSAF LF --

## (undated) DEVICE — SUTURE VCRL SZ 2-0 L36IN ABSRB UD L36MM CT-1 1/2 CIR J945H

## (undated) DEVICE — I.V. DRAIN SPONGES: Brand: DERMACEA

## (undated) DEVICE — GLOVE SURG SZ7 SYN PF BIOGEL PI ULTRATOUCH STRL

## (undated) DEVICE — MEDI-VAC NON-CONDUCTIVE SUCTION TUBING: Brand: CARDINAL HEALTH

## (undated) DEVICE — HOOKS ELASTIC STAY 12MM BLUNT -- PK/8

## (undated) DEVICE — ANTI-EMBOLISM STOCKINGS,KNEE LENGTH,LARGE,REGULAR,SIZE E-: Brand: T.E.D.

## (undated) DEVICE — GAUZE SPONGES,12 PLY: Brand: CURITY

## (undated) DEVICE — TOWEL OR BL STR 4/PK -- MEDICHOICE - MEDLINE

## (undated) DEVICE — LIGHT HANDLE: Brand: DEVON

## (undated) DEVICE — APPLIER CLP LIG SM TI PREM SURGCLP SUPER INTLOK 20 DISP

## (undated) DEVICE — KIT TISS EXP W/ 60ML SYR 122CM TRNSF SET PIERCING DEV L25CM

## (undated) DEVICE — STRIP,CLOSURE,WOUND,MEDI-STRIP,1/2X4: Brand: MEDLINE

## (undated) DEVICE — SOLUTION IRRIG 1000ML 0.9% SOD CHL USP POUR PLAS BTL

## (undated) DEVICE — FRAZIER SUCTION INSTRUMENT 7 FR W/CONTROL VENT & OBTURATOR: Brand: FRAZIER

## (undated) DEVICE — TRANSFER SET 3": Brand: MEDLINE INDUSTRIES, INC.

## (undated) DEVICE — ELECTRO LUBE IS A SINGLE PATIENT USE DEVICE THAT IS INTENDED TO BE USED ON ELECTROSURGICAL ELECTRODES TO REDUCE STICKING.: Brand: KEY SURGICAL ELECTRO LUBE

## (undated) DEVICE — SOL IRRIGATION INJ NACL 0.9% 500ML BTL

## (undated) DEVICE — ROCKER SWITCH PENCIL BLADE ELECTRODE, HOLSTER: Brand: EDGE

## (undated) DEVICE — MASTISOL ADHESIVE LIQ 2/3ML

## (undated) DEVICE — DRAPE,REIN 53X77,STERILE: Brand: MEDLINE

## (undated) DEVICE — AGENT HEMSTAT W2XL14IN OXIDIZED REGENERATED CELOS ABSRB FOR

## (undated) DEVICE — BASIC PACK: Brand: CONVERTORS

## (undated) DEVICE — SHEET, T, LAPAROTOMY, STERILE: Brand: MEDLINE

## (undated) DEVICE — DBD-PACK,LAPAROTOMY,2 REINFORCED GOWNS: Brand: MEDLINE

## (undated) DEVICE — CONTINU-FLO SOLUTION SET, 2 INJECTION SITES, MALE LUER LOCK ADAPTER WITH RETRACTABLE COLLAR, LARGE BORE STOPCOCK WITH ROTATING MALE LUER LOCK EXTENSION SET, 2 INJECTION SITES, MALE LUER LOCK ADAPTER WITH RETRACTABLE COLLAR: Brand: INTERLINK/CONTINU-FLO

## (undated) DEVICE — SUT PROL 2-0 30IN CT1 BLU --

## (undated) DEVICE — SUTURE PROL SZ 2-0 L36IN NONABSORBABLE BLU SH L26MM 1/2 CIR 8523H

## (undated) DEVICE — SOLUTION IRRIG 1000ML H2O STRL BLT

## (undated) DEVICE — X-RAY SPONGES,16 PLY: Brand: DERMACEA

## (undated) DEVICE — APPLIER LIG CLP M L11IN TI STR RNG HNDL FOR 20 CLP DISP

## (undated) DEVICE — CATHETER IV 20GA L1.25IN FEP STR HUB TEF INTROCAN SFTY

## (undated) DEVICE — SUTURE ABSORBABLE BRAIDED 3-0 SHB 18 IN UD VICRYL + VCPB864D

## (undated) DEVICE — 1200 GUARD II KIT W/5MM TUBE W/O VAC TUBE: Brand: GUARDIAN

## (undated) DEVICE — 3000CC GUARDIAN II: Brand: GUARDIAN

## (undated) DEVICE — NEEDLE HYPO 25GA L1.5IN BVL ORIENTED ECLIPSE

## (undated) DEVICE — GOWN,SIRUS,FABRNF,XL,20/CS: Brand: MEDLINE

## (undated) DEVICE — DRAPE,LAPAROTOMY,PED,STERILE: Brand: MEDLINE

## (undated) DEVICE — COVER US PRB W5XL48IN PUL UP 3D END KT

## (undated) DEVICE — CODMAN® SURGICAL PATTIES 3/4" X 3/4" (1.91CM X 1.91CM): Brand: CODMAN®

## (undated) DEVICE — STERILE POLYISOPRENE POWDER-FREE SURGICAL GLOVES WITH EMOLLIENT COATING: Brand: PROTEXIS

## (undated) DEVICE — DRAPE MICSCP W46XL120IN FOR ZEISS MD FEATURING CLEARLENS

## (undated) DEVICE — 2DE12 3-0 UNDYD MONODERM 30X30: Brand: 2DE12 3-0 UNDYD MONODERM 30X30

## (undated) DEVICE — SENSOR OXMTR PTCH TISS DISP

## (undated) DEVICE — SUTURE VCRL SZ 3-0 L27IN ABSRB UD L19MM PS-2 3/8 CIR PRIM J427H

## (undated) DEVICE — PACK,ORTOHMAX/CVMAX,UNIVERSAL,5/CS: Brand: MEDLINE

## (undated) DEVICE — PENCIL ES CRD L10FT HND SWCHING ROCK SWCH W/ EDGE COAT BLDE

## (undated) DEVICE — KENDALL DL ECG CABLE AND LEAD WIRE SYSTEM, 3-LEAD, SINGLE PATIENT USE: Brand: KENDALL

## (undated) DEVICE — 3M™ BAIR HUGGER® UNDERBODY BLANKET, FULL ACCESS, 10 PER CASE 63500: Brand: BAIR HUGGER™

## (undated) DEVICE — APPLIER CLP L9.375IN APER 2.1MM CLS L3.8MM 20 SM TI CLP

## (undated) DEVICE — GLOVE SURG SZ 75 L12IN FNGR THK79MIL GRN LTX FREE

## (undated) DEVICE — CORD ELECSURG BPLR 12 FT DISP [810T818750] [ADLER INSTRUMENT CO]

## (undated) DEVICE — TRAY CATH SIL 16FR 10 CA STATLOK

## (undated) DEVICE — DRAPE FLD WRM W44XL66IN C6L FOR INTRATEMP SYS THERMABASIN

## (undated) DEVICE — SUTURE VCRL SZ 3-0 L18IN ABSRB VLT SUTUPAK PRECUT W/O NDL J104T

## (undated) DEVICE — SYR 10ML LUER LOK 1/5ML GRAD --

## (undated) DEVICE — BNDG ADH FABRIC 2X4IN ST LF --

## (undated) DEVICE — KIT INFECTION CTRL ST FRAN --

## (undated) DEVICE — SMOKE EVACUATION PENCIL: Brand: VALLEYLAB

## (undated) DEVICE — PAD,NON-ADHERENT,3X8,STERILE,LF,1/PK: Brand: MEDLINE

## (undated) DEVICE — STERILE POLYISOPRENE POWDER-FREE SURGICAL GLOVES: Brand: PROTEXIS

## (undated) DEVICE — SPONGE: SPECIALTY PEANUT XR 100/CS: Brand: MEDICAL ACTION INDUSTRIES

## (undated) DEVICE — BASIC SINGLE BASIN BTC-LF: Brand: MEDLINE INDUSTRIES, INC.

## (undated) DEVICE — APPLIER CLP AUTO MED 9.75 IN TI SURGCLP SUPER INTLOK 20 DISP

## (undated) DEVICE — Z DISCONTINUED PER MEDLINE PACK PROCEDURE SURG PLAS

## (undated) DEVICE — 3M™ TEGADERM™ TRANSPARENT FILM DRESSING FRAME STYLE, 1626W, 4 IN X 4-3/4 IN (10 CM X 12 CM), 50/CT 4CT/CASE: Brand: 3M™ TEGADERM™

## (undated) DEVICE — GLOVE SURG SZ 8 CRM LTX FREE POLYISOPRENE POLYMER BEAD ANTI